# Patient Record
Sex: FEMALE | Race: ASIAN | NOT HISPANIC OR LATINO | Employment: UNEMPLOYED | URBAN - METROPOLITAN AREA
[De-identification: names, ages, dates, MRNs, and addresses within clinical notes are randomized per-mention and may not be internally consistent; named-entity substitution may affect disease eponyms.]

---

## 2017-08-28 ENCOUNTER — GENERIC CONVERSION - ENCOUNTER (OUTPATIENT)
Dept: OTHER | Facility: OTHER | Age: 27
End: 2017-08-28

## 2018-01-15 NOTE — MISCELLANEOUS
Message  patient called c/o spotting  states she had bad abdominal cramping through the night and woke this morning to some heavier bleeding and passing tissue  patient denies any pain or bleeding at this time  given appt to be seen on Monday  counseled to go to ER if she developes and new symptoms, fever, heavy bleeding or pain  Active Problems    1  Chronic fatigue (780 79) (R53 82)   2  First trimester pregnancy (V22 2) (Z33 1)   3  Hyperlipidemia (272 4) (E78 5)   4  Hypocalcemia (275 41) (E83 51)   5  Need for Tdap vaccination (V06 1) (Z23)   6  Vitamin D deficiency (268 9) (E55 9)   7  Weight loss (783 21) (R63 4)    Current Meds   1  Pre- Formula Oral Tablet; Therapy: (Recorded:[id number]) to Recorded    Allergies    1  No Known Drug Allergies    2  No Known Environmental Allergies   3   No Known Food Allergies    Signatures   Electronically signed by : Nevaeh Kaiser, ; Oct  5 2016  3:22PM EST                       (Author)

## 2018-01-18 NOTE — MISCELLANEOUS
Message  CALLED PATIENT TO RESCHEDULE MISSED APPT  LEFT MESSAGE ON VOICEMAIL  [employer] Active Problems    1  Chronic fatigue (780 79) (R53 82)   2  First trimester pregnancy (V22 2) (Z34 90)   3  Hyperlipidemia (272 4) (E78 5)   4  Hypocalcemia (275 41) (E83 51)   5  Need for Tdap vaccination (V06 1) (Z23)   6  Vitamin D deficiency (268 9) (E55 9)   7  Weight loss (783 21) (R63 4)    Current Meds   1  Pre-Felicia Formula Oral Tablet; Therapy: (Recorded:[id number]) to Recorded    Allergies    1  No Known Drug Allergies    2  No Known Environmental Allergies   3   No Known Food Allergies    Signatures   Electronically signed by : Digna Paget, ; Aug 28 2017 11:28AM EST                       (Author)

## 2019-01-14 ENCOUNTER — OFFICE VISIT (OUTPATIENT)
Dept: OBGYN CLINIC | Facility: CLINIC | Age: 29
End: 2019-01-14

## 2019-01-14 VITALS
SYSTOLIC BLOOD PRESSURE: 117 MMHG | HEIGHT: 63 IN | HEART RATE: 101 BPM | DIASTOLIC BLOOD PRESSURE: 86 MMHG | BODY MASS INDEX: 19.49 KG/M2 | WEIGHT: 110 LBS

## 2019-01-14 DIAGNOSIS — E83.51 HYPOCALCEMIA: ICD-10-CM

## 2019-01-14 DIAGNOSIS — O21.9 NAUSEA/VOMITING IN PREGNANCY: Primary | ICD-10-CM

## 2019-01-14 PROCEDURE — 99213 OFFICE O/P EST LOW 20 MIN: CPT | Performed by: OBSTETRICS & GYNECOLOGY

## 2019-01-14 RX ORDER — MELATONIN
1000 DAILY
Qty: 30 TABLET | Refills: 11 | Status: ON HOLD | OUTPATIENT
Start: 2019-01-14 | End: 2019-07-18 | Stop reason: ALTCHOICE

## 2019-01-14 RX ORDER — PYRIDOXINE HCL (VITAMIN B6) 25 MG
25 TABLET ORAL DAILY
Qty: 90 TABLET | Refills: 1 | Status: SHIPPED | OUTPATIENT
Start: 2019-01-14 | End: 2019-09-05 | Stop reason: SDUPTHER

## 2019-01-14 RX ORDER — PRASTERONE (DHEA) 50 MG
1 CAPSULE ORAL 3 TIMES DAILY
Qty: 90 EACH | Refills: 1 | Status: SHIPPED | OUTPATIENT
Start: 2019-01-14 | End: 2019-05-08 | Stop reason: ALTCHOICE

## 2019-01-14 RX ORDER — SWAB
1 SWAB, NON-MEDICATED MISCELLANEOUS DAILY
Qty: 30 EACH | Refills: 11 | Status: SHIPPED | OUTPATIENT
Start: 2019-01-14 | End: 2019-05-08 | Stop reason: ALTCHOICE

## 2019-01-14 NOTE — PROGRESS NOTES
ASSESSMENT  Sagar Trent is a 25 y o  [id number] w/LMP 10/10/18 presenting for viability scan    PLAN  Prenatal vitamins: Rx sent  Existing meds: reviewed  N&V screen: c/o Nausea; tolerates PO; Rx B6, lorri  dating TVUS: completed; Pt informed of due date   ____________________________________________________________    SUBJECTIVE  No complaints ; She denies contractions, loss of fluid, or vaginal bleeding  She denies regular fetal movements  OBJECTIVE        Past Medical History:   Diagnosis Date    Miscarriage     2015     History reviewed  No pertinent surgical history  OB History   No data available      reports that she has never smoked  She has never used smokeless tobacco  She reports that she does not drink alcohol or use drugs  Immunization History   Administered Date(s) Administered    HPV Quadrivalent 09/09/2013    Tdap 08/27/2013     No Known Allergies  Patient's Medications    No medications on file       Vitals:    01/14/19 1347   BP: 117/86   Pulse: 101   Weight: 49 9 kg (110 lb)   Height: 5' 3" (1 6 m)       Most recent 5 weights: Wt Readings from Last 5 Encounters:   01/14/19 49 9 kg (110 lb)   09/29/16 49 kg (108 lb)   09/26/16 49 3 kg (108 lb 9 6 oz)   04/27/16 54 kg (119 lb 0 8 oz)   11/12/13 54 2 kg (119 lb 8 9 oz)         Body mass index is 19 49 kg/m²  TVUS:  Anatomic detail is limited at this gestational age  The gestational sac is normal in contour and shape  The gestational sac is located in the uterus  A single fetus is observed  The fetal cranium appears normal in shape  The nuchal translucency measurement is 17 mm  The nasal bone appears to be present  The intracranial anatomy is unremarkable  Evaluation of the spine reveals no obvious evidence for a neural tube defect  Anatomy of the fetal thorax appears within normal limits  The cardiac rhythm is regular  Cardiac wall motion is observed at 157 bpm via 'M' mode    Within the abdomen, the stomach & bladder is visualized  The abdominal wall appears intact  Active movement of the fetal body & extremities is seen  The placenta is posterior in location  There is no suspicion of a subchorionic hematoma  There is no suspicion of a uterine fibroid  Free fluid is not seen in the posterior cul-de-sac  Patient's last menstrual period was 10/10/2018  which is consistent with 13 weeks 5 days  Measured crown-rump length corresponds to 13 weeks 4 days  Final due date is 7/17/19  The left ovary measures L x W x H: 2 25 x 1 99 x 2 32 cm  It is unremarkable in size and appearance  The right ovary measures L x W x H: 2 92 x 2 38 x 1 50 cm  It is unremarkable in size and appearance  No gross abnormalities were noted on this examination

## 2019-01-14 NOTE — PROGRESS NOTES
Note on use of High Level Disinfection Process (Trophon) for transvaginal probe:     Isaura Carey    REF: [id number]   SN: [id number]     [address] [place] [address] [place] #[id number]

## 2019-01-21 ENCOUNTER — TELEPHONE (OUTPATIENT)
Dept: OBGYN CLINIC | Facility: CLINIC | Age: 29
End: 2019-01-21

## 2019-02-11 ENCOUNTER — INITIAL PRENATAL (OUTPATIENT)
Dept: OBGYN CLINIC | Facility: CLINIC | Age: 29
End: 2019-02-11

## 2019-02-11 DIAGNOSIS — Z3A.17 17 WEEKS GESTATION OF PREGNANCY: Primary | ICD-10-CM

## 2019-02-11 DIAGNOSIS — Z34.92 PREGNANT AND NOT YET DELIVERED IN SECOND TRIMESTER: ICD-10-CM

## 2019-02-11 PROCEDURE — T1001 NURSING ASSESSMENT/EVALUATN: HCPCS

## 2019-02-11 NOTE — PATIENT INSTRUCTIONS
Warning signs during pregnancy      When to Call    1  Vaginal bleeding  2  Sharp abdominal pain that does not go away  3  Fever (more than 100 4 and is not relieved by Tylenol)  4  Persistent vomiting lasting greater than 24 hours  5  Chest pain   6  Pain or burning when you urinate  7  Severe headache that doesn't resolve with Tylenol  8  Blurred vision or seeing spots in your vision  9  Sudden swelling of your face or hands  10  Redness, swelling or pain in a leg  11  A sudden weight gain in just a few days  12  Decrease in your baby's movement (after 28 weeks or the 6th month of pregnancy)  13  A loss of watery fluid from your vagina - can be a gush, a trickle or continuous wetness  14  After 20 weeks of pregnancy, rhythmic cramping (greater than 4 per hour) or menstrual like low/pelvic pain          Medications and Pregnancy: The following list of over-the-counter medications is usually considered safe to take during pregnancy  Take care to not double up on products containing acetaminophen (Tylenol)  Colds/Sore Throat   Robitussin DM - Plain (guaifenesin)   Saline nasal spray   Warm salt water gargle   Cepacol throat lozenges or mouthwash (cetylpyridinium)   Sucrets (hexylresoricinol)    Allergy  AVOID the D - or DECONGESTANT   Claritin (loratadine)   Zrytec (cetirizine)   Allerga (fexofenadine)   Headache/ Aches and Pains    Headaches / Aches and Pains:   Tylenol (acetaminophen)  Do NOT exceed more than 3000 mg of Tylenol in a 24-hour period      Heartburn   Mylanta (aluminum hydroxide/simethicone, magnesium hydroxide)   Maalaox (aluminum magnesium hydroxide, magnesium hydroxide)   Tums (calcium carbonate)   Riopan (magaldrate)    Constipation   Colace (docusate sodium)   Surfak (docusate sodium)   MiraLAX   Glycerin suppositories   Fleets enema (sodium phosphate & sodium biphosphate)    Nausea/Vomiting   Vitamin B6 (pyridoxine) - May take 50 mg at bedtime, 25 mg in the morning, 25 mg in the afternoon   Unisom (doxylamine) - May use for nausea/vomiting - (cut a 25 mg tablet in half)  May cause drowsiness  Sleep   Benadryl (diphenhydramine) - Take 1-2 tablets as needed at bedtime   Unisom (doxylamine) 25 mg tablet - As needed at bedtime   Melatonin 5 mg tablet - As needed at bedtime    Generally the generic form of medicine is usually lower priced than the brand name form of the medicine  Pregnancy   AMBULATORY CARE:   What you need to know about pregnancy:  A normal pregnancy lasts about 40 weeks  The first trimester lasts from your last period through the 12th week of pregnancy  The second trimester lasts from the 13th week of your pregnancy through the 23rd week  The third trimester lasts from your 24th week of pregnancy until your baby is born  If you know the date of your last period, your healthcare provider can estimate your due date  You may give birth to your baby any time from 37 weeks to 2 weeks after your due date  Seek care immediately if:   · You develop a severe headache that does not go away  · You have new or increased vision changes, such as blurred or spotted vision  · You have new or increased swelling in your face or hands  · You have pain or cramping in your abdomen or low back  · You have vaginal bleeding  Contact your healthcare provider or obstetrician if:   · You have abdominal cramps, pressure, or tightening  · You have a change in vaginal discharge  · You cannot keep food or drinks down, and you are losing weight  · You have chills or a fever  · You have vaginal itching, burning, or pain  · You have yellow, green, white, or foul-smelling vaginal discharge  · You have pain or burning when you urinate, less urine than usual, or pink or bloody urine  · You have questions or concerns about your condition or care    Body changes that may occur during your pregnancy:   · Breast changes  you will experience include tenderness and tingling during the early part of your pregnancy  Your breasts will become larger  You may need to use a support bra  You may see a thin, yellow fluid, called colostrum, leak from your nipples during the second trimester  Colostrum is a liquid that changes to milk about 3 days after you give birth  · Skin changes and stretch marks  may occur during your pregnancy  You may have red marks, called stretch marks, on your skin  Stretch marks will usually fade after pregnancy  Use lotion if your skin is dry and itchy  The skin on your face, around your nipples, and below your belly button may darken  Most of the time, your skin will return to its normal color after your baby is born  · Morning sickness  is nausea and vomiting that can happen at any time of day  Avoid fatty and spicy foods  Eat small meals throughout the day instead of large meals  Lexi may help to decrease nausea  Ask your healthcare provider about other ways of decreasing nausea and vomiting  · Heartburn  may be caused by changes in your hormones during pregnancy  Your growing uterus may also push your stomach upward and force stomach acid to back up into your esophagus  Eat 4 or 5 small meals each day instead of large meals  Avoid spicy foods  Avoid eating right before bedtime  · Constipation  may develop during your pregnancy  To treat constipation, eat foods high in fiber such as fiber cereals, beans, fruits, vegetables, whole-grain breads, and prune juice  Get regular exercise and drink plenty of water  Your healthcare provider may also suggest a fiber supplement to soften your bowel movements  Talk to your healthcare provider before you use any medicines to decrease constipation  · Hemorrhoids  are enlarged veins in the rectal area  They may cause pain, itching, and bright red bleeding from your rectum  To decrease your risk of hemorrhoids, prevent constipation and do not strain to have a bowel movement   If you have hemorrhoids, soak in a tub of warm water to ease discomfort  Ask your healthcare provider how you can treat hemorrhoids  · Leg cramps and swelling  may be caused by low calcium levels or the added weight of pregnancy  Raise your legs above the level of your heart to decrease swelling  During a leg cramp, stretch or massage the muscle that has the cramp  Heat may help decrease pain and muscle spasms  Apply heat on your muscle for 20 to 30 minutes every 2 hours for as many days as directed  · Back pain  may occur as your baby grows  Do not stand for long periods of time or lift heavy items  Use good posture while you stand, squat, or bend  Wear low-heeled shoes with good support  Rest may also help to relieve back pain  Ask your healthcare provider about exercises you can do to strengthen your back muscles  Stay healthy during your pregnancy:   · Eat a variety of healthy foods  Healthy foods include fruits, vegetables, whole-grain breads, low-fat dairy foods, beans, lean meats, and fish  Drink liquids as directed  Ask how much liquid to drink each day and which liquids are best for you  Limit caffeine to less than 200 milligrams each day  Limit your intake of fish to 2 servings each week  Choose fish low in mercury such as canned light tuna, shrimp, crab, salmon, cod, or tilapia  Do not  eat fish high in mercury such as swordfish, tilefish, randy mackerel, and shark  · Take prenatal vitamins as directed  Your need for certain vitamins and minerals, such as folic acid, increases during pregnancy  Prenatal vitamins provide some of the extra vitamins and minerals you need  Prenatal vitamins may also help to decrease the risk of certain birth defects  · Ask how much weight you should gain during your pregnancy  Too much or too little weight gain can be unhealthy for you and your baby  · Talk to your healthcare provider about exercise  Moderate exercise can help you stay fit   Your healthcare provider will help you plan an exercise program that is safe for you during pregnancy  · Do not smoke  If you smoke, it is never too late to quit  Smoking increases your risk of a miscarriage and other health problems during your pregnancy  Smoking can cause your baby to be born too early or weigh less at birth  Ask your healthcare provider for information if you need help quitting  · Do not drink alcohol  Alcohol passes from your body to your baby through the placenta  It can affect your baby's brain development and cause fetal alcohol syndrome (FAS)  FAS is a group of conditions that causes mental, behavior, and growth problems  · Talk to your healthcare provider before you take any medicines  Many medicines may harm your baby if you take them when you are pregnant  Do not take any medicines, vitamins, herbs, or supplements without first talking to your healthcare provider  Never use illegal or street drugs (such as marijuana or cocaine) while you are pregnant  Safety tips:   · Avoid hot tubs and saunas  Do not use a hot tub or sauna while you are pregnant, especially during your first trimester  Hot tubs and saunas may raise your baby's temperature and increase the risk of birth defects  · Avoid toxoplasmosis  This is an infection caused by eating raw meat or being around infected cat feces  It can cause birth defects, miscarriages, and other problems  Wash your hands after you touch raw meat  Make sure any meat is well-cooked before you eat it  Avoid raw eggs and unpasteurized milk  Use gloves or ask someone else to clean your cat's litter box while you are pregnant  · Ask your healthcare provider about travel  The most comfortable time to travel is during the second trimester  Ask your healthcare provider if you can travel after 36 weeks  You may not be able to travel in an airplane after 36 weeks  He may also recommend that you avoid long road trips    Follow up with your healthcare provider or obstetrician as directed:  Go to all of your prenatal visits during your pregnancy  Write down your questions so you remember to ask them during your visits  © 2017 [employer]  Information is for End User's use only and may not be sold, redistributed or otherwise used for commercial purposes  All illustrations and images included in CareNotes® are the copyrighted property of [employer]  or Devendra Thomas  The above information is an  only  It is not intended as medical advice for individual conditions or treatments  Talk to your doctor, nurse or pharmacist before following any medical regimen to see if it is safe and effective for you  Pregnancy Diet   WHAT YOU NEED TO KNOW:   What is a healthy diet during pregnancy? A healthy diet during pregnancy is a meal plan that provides the amount of calories and nutrients you need during pregnancy  Your body needs extra calories and nutrients to support your growing baby  You need to gain the right amount of weight for a healthy baby and pregnancy  Babies born at a healthy weight have a lower risk of certain health problems at birth and later in life  A healthy diet may help you avoid gaining too much weight  Too much weight gain may cause problems for you during your pregnancy and delivery  What should I avoid while I am pregnant? · Alcohol:  Do not drink alcohol during pregnancy  Alcohol can increase your risk of a miscarriage (losing your baby)  Your baby may also be born too small and have other health problems, such as learning problems later in life  · Caffeine: It is not clear how caffeine affects pregnancy  Limit your intake of caffeine to avoid possible health problems  Caffeine may be found in coffee, tea, cola, sports drinks, and chocolate  · Foods that contain mercury:  Mercury is naturally found in almost all types of fish and shellfish   Some types of fish absorb higher levels of mercury that can be harmful to an unborn baby  Eat only fish and shellfish that are low in mercury  Each week, you may eat up to 12 ounces of fish or shellfish that have low levels of mercury  These include shrimp, canned light tuna, salmon, pollock, and catfish  Eat only 6 ounces of albacore (white) tuna per week  Albacore tuna has more mercury than canned tuna  Do not eat shark, swordfish, randy mackerel, or tilefish  · Raw and undercooked foods: You should not eat undercooked or raw meat, poultry, eggs, fish, or shellfish (shrimp, crab, lobster)  Cook leftover foods and ready-to-eat foods such as hot dogs until they are steaming hot  · Unpasteurized food:  Unpasteurized foods are foods that have not gone through the heating process (pasteurization) that destroys bacteria  You should not drink milk, juice, or cheese that has not been pasteurized  This includes Brie, feta, Camembert, blue, and [place] cheeses  Which foods can I eat while I am pregnant? Eat a variety of foods from each of the food groups listed below  Your dietitian will tell you how many servings you should have from each food group each day to get enough calories  The amount of calories you need depends on your daily activity, your weight before pregnancy, and current weight gain  Healthcare providers divide pregnancy into 3 periods of time called trimesters  In the first trimester, you usually do not need extra calories  In the second and third trimesters, most women should eat about 300 extra calories each day  · Fruits and vegetables:  Half of your plate should contain fruits and vegetables  ¨ Fruits:  Choose fresh, canned, or dried fruit as often as possible  ¨ 1 cup of sliced, diced, cooked, or canned fruit (canned in light syrup or 100% juice)    ¨ 1 large peach, orange, or banana    ¨ ½ cup of dried fruit    ¨ 1 cup of fruit juice    ¨ Vegetables:  Eat more dark green, red, and orange vegetables   Dark green vegetables include broccoli, spinach, jerry lettuce, and cher greens  Examples of orange and red vegetables are carrots, sweet potatoes, winter squash, oranges, and red peppers  ¨ 1 cup of cooked or raw vegetables    ¨ 1 cup of vegetable juice    ¨ 2 cups of raw leafy greens    · Grains:  Half of the grains you eat each day should be whole grains  ¨ Whole grains:      ¨ ½ cup of cooked brown rice or cooked oatmeal    ¨ 1 cup (1 ounce) of whole-grain dry cereal    ¨ 1 slice of 293% whole-wheat or rye bread    ¨ 3 cups of popped popcorn    ¨ Other grains:      ¨ ½ cup of cooked white rice or pasta    ¨ ½ of an English muffin    ¨ 1 small flour or corn tortilla    ¨ 1 mini-bagel    · Dairy foods:  Choose fat-free or low-fat dairy foods:    ¨ 1½ ounces of hard cheese (mozzarella, Swiss, cheddar)     ¨ 1 cup (8 ounces) of low-fat or fat-free milk or yogurt    ¨ 1 cup of low-fat frozen yogurt or pudding    · Meat and other protein sources:  Choose lean meats and poultry  Bake, broil, and grill meat instead of frying it  Include a variety of seafood in place of some meat and poultry each week  Eat a variety of protein foods:    ¨ ½ ounce of nuts (12 almonds, 24 pistachios, 7 walnut halves) or 1 tablespoon of peanut butter (1 ounce)    ¨ ¼ cup of soy tofu or tempeh (1 ounce)    ¨ 1 egg    ¨ ¼ cup of cooked dried beans, peas, or lentils (1 ounce)    ¨ 1 small chicken breast or 1 small trout (about 3 ounces)    ¨ 1 salmon steak (4 to 6 ounces)    ¨ 1 small lean hamburger (2 to 3 ounces)    · Fats:  Limit saturated fats, trans fats, and cholesterol  These unhealthy fats are found in shortening, butter, stick margarine, and animal fat  Choose healthy fats such as polyunsaturated and monounsaturated fats:     ¨ 1 tablespoon of canola, olive, corn, sunflower, or soybean oil    ¨ 1 tablespoon of soft margarine    ¨ 1 teaspoon of mayonnaise    ¨ 2 tablespoons of salad dressing    ¨ ½ of an avocado  What vitamin and mineral supplements may I need? Your healthcare provider will tell you if you need a supplement and the type you should take  Talk to your healthcare provider before you take any other kind of supplement, including herbal (natural) supplements  · Prenatal vitamins:  Eat a variety of healthy foods, even if you take a prenatal vitamin  If you forget to take your vitamin, do not take double the amount the next day  · Folic acid:  You need at least 465 mcg of folic acid each day before you get pregnant  Folic acid helps to form your baby's brain and spinal cord in early pregnancy  During pregnancy, your daily need for folic acid increases to about 600 mcg  Get folic acid each day by eating citrus fruits and juices, green leafy vegetables, liver, or dried beans  Folic acid is also added to foods such as breakfast cereals, bread products, flour, and pasta  · Iron:  Iron is a mineral the body needs to make hemoglobin, which is a part of red blood cells  Hemoglobin helps your blood carry oxygen from the lungs to the rest of your body  Foods that are good sources of iron are meat, poultry, fish, beans, spinach, and fortified cereals and breads  Your body will absorb iron better from non-meat sources if you have a source of vitamin C at the same time  Drink tea and coffee separately from iron-fortified foods and iron supplements  You need about 30 mg of iron each day during pregnancy  · Calcium and vitamin D:  Women who do not eat dairy products may need a calcium and vitamin D supplement  Talk to your healthcare provider about calcium supplements if you do not regularly eat good sources of calcium  The amount of calcium you need is about 1,300 mg if you are between 15and 25years old and 1,000 mg if you are 23to 48years old  What diet changes may help if I have morning sickness? Morning sickness is common during the first few months of pregnancy  You may feel nauseated, and you may vomit several times each day   To improve symptoms of morning sickness, eat small, frequent meals instead of 3 large meals  Foods high in carbohydrate, such as crackers, dry toast, and pasta, may be easier for you to eat  Drink liquids between meals rather than with meals  What diet changes may decrease constipation? A high-fiber diet can improve the symptoms of constipation  Whole-grain breakfast cereals, whole-grain breads, and prune juice are high in fiber  Raw fruits and vegetables, and cooked beans are also good sources of fiber  It may also be helpful to increase your intake of fluids and get regular physical activity  Talk with your healthcare provider before you begin any exercise program    What diet changes may decrease heartburn? To improve the symptoms of heartburn, do not lie down right after you eat  When you do lie down, sleep with your head slightly elevated  Eat small, frequent meals instead of 3 large meals  It may also be helpful to avoid caffeine, chocolate, and spicy foods  How can I get enough calcium if I cannot tolerate dairy foods? If you cannot drink milk or eat dairy foods, try lactose-free or lactose-reduced milk or calcium-fortified soy milk  Ask your healthcare provider about pills you can take to help you digest milk products  Eat other drinks and foods that are fortified with calcium, such as orange juice  What other healthy guidelines should I follow? · Vegetarians and vegans: If you are a vegetarian or vegan, get enough protein, vitamin B12, and iron during your pregnancy  Some non-meat sources of these nutrients are fortified cereals, nut butters, soy products (tofu and soymilk), nuts, grains, and legumes  These nutrients are also found in eggs and milk products  · Cravings: You may have cravings for certain foods during your pregnancy  Foods that are high in calories, fat, and sugar should not replace healthy food choices   Some women have cravings for unusual substances such as andrei, dirt, laundry starch, ice, and mikie  This condition is called pica  These may lead to health problems such as anemia and cause other health problems  When should I contact my healthcare provider? · You are losing weight without trying  · You have cravings for substances such as andrei, dirt, laundry starch, or ice  · You have questions or concerns about your condition or care  CARE AGREEMENT:   You have the right to help plan your care  Discuss treatment options with your caregivers to decide what care you want to receive  You always have the right to refuse treatment  The above information is an  only  It is not intended as medical advice for individual conditions or treatments  Talk to your doctor, nurse or pharmacist before following any medical regimen to see if it is safe and effective for you  © 2017 [employer] Information is for End User's use only and may not be sold, redistributed or otherwise used for commercial purposes  All illustrations and images included in CareNotes® are the copyrighted property of [employer]  or Devendra Thomas  Nausea and Vomiting in Pregnancy   AMBULATORY CARE:   Nausea and vomiting in pregnancy  can happen any time of day  These symptoms usually start before the 9th week of pregnancy, and end by the 14th week (second trimester)  Some women can have nausea and vomiting for a longer time  These symptoms can affect some women throughout the entire pregnancy  Nausea and vomiting do not harm your baby  These symptoms can make it hard for you to do your daily activities  Seek care immediately if:   · You have signs of dehydration  Examples are dark yellow urine, dry mouth and lips, dry skin, fast heartbeat, and urinating less than usual     · You have severe abdominal pain  · You feel too weak or dizzy to stand up  · You see blood in your vomit or bowel movements    Contact your healthcare provider if:   · You vomit more than 4 times in 1 day     · You have not been able to keep liquids down for more than 1 day  · You lose more than 2 pounds  · You have a fever  · Your nausea and vomiting continue longer than 14 weeks  · You have questions or concerns about your condition or care  Treatment  for nausea and vomiting in pregnancy is usually not needed  You can make changes in the foods you eat and in your activities to help manage your symptoms  You may need to try several things to learn what works for you  Talk to your healthcare provider if your symptoms do not decrease with the changes suggested below  You may need vitamin B6 and medicine if these changes do not help, or your symptoms become severe  Nutrition changes you can make to manage nausea and vomiting:   · Eat small meals throughout the day instead of 3 large meals  You may be more likely to have nausea and vomiting when your stomach is empty  Eat foods that are low in fat and high in protein  Examples are lean meat, beans, turkey, and chicken without the skin  Eat a small snack, such as crackers, dry cereal, or a small sandwich before you go to bed  · Eat some crackers or dry toast before you get out of bed in the morning  Get out of bed slowly  Sudden movements could cause you to get dizzy and nauseated  · Eat bland foods when you feel nauseated  Examples of bland foods include dry toast, dry cereal, plain pasta, white rice, and bread  Other bland foods include saltine crackers, bananas, gelatin, and pretzels  Avoid spicy, greasy, and fried foods  Avoid any other foods that make you feel nauseated  · Drink liquids that contain ginger  Drink ginger ale made with real ginger or ginger tea made with fresh grated ginger  Ginger capsules or ginger candies may also help to decrease nausea and vomiting  · Drink liquids between meals instead of with meals  Wait at least 30 minutes after you eat to drink liquids   Drink small amounts of liquids often throughout the day to prevent dehydration  Ask how much liquid you should drink each day  Other changes you can make to manage nausea and vomiting:   · Avoid smells that bother you  Strong odors may cause nausea and vomiting to start, or make it worse  Take a short walk, turn on a fan, or try to sleep with the window open to get fresh air  When you are cooking, open windows to get rid of smells that may cause nausea  · Do not brush your teeth right after you eat  if it makes you nauseated  · Rest when you need to  Start activity slowly and work up to your usual routine as you start to feel better  · Talk to your healthcare provider about your prenatal vitamins  Prenatal vitamins can cause nausea for some women  Try taking your prenatal vitamin at night or with a snack  If this change does not help, your healthcare provider may recommend a different type of vitamin  · Do not use any medicines, vitamins, or supplements to manage your symptoms without asking your healthcare provider  Many medicines can harm an unborn baby  · Light to moderate exercise  may help to decrease your symptoms  It may also help you to sleep better at night  Ask your healthcare provider about the best exercise plan for you  Follow up with your healthcare provider as directed:  Write down your questions so you remember to ask them during your visits  © 2017 [address] [facility] Information is for End User's use only and may not be sold, redistributed or otherwise used for commercial purposes  All illustrations and images included in CareNotes® are the copyrighted property of [employer]  or Devendra Thomas  The above information is an  only  It is not intended as medical advice for individual conditions or treatments  Talk to your doctor, nurse or pharmacist before following any medical regimen to see if it is safe and effective for you        Zika Virus: Information for Pregnant Women   AMBULATORY CARE:   Zika virus  Zika virus is carried by mosquitos  The virus is spread to a human through the bite of an infected mosquito  The virus may also be passed from one person to another through sex  [place] virus may be passed from a mother to her unborn baby  This may cause birth defects such as poor brain development  It may also cause pregnancy loss  There is currently no vaccine to prevent Zika virus infection  Common signs and symptoms include the following: You may not have signs or symptoms of Zika virus  If you develop symptoms, they may happen suddenly and last for 2 to 7 days  You may have any of the following:  · Fever    · Rash    · Headache    · Muscle or joint pain    · Red or itchy eyes  Contact your healthcare provider if:   · You think you have been exposed to [place] virus  · You have symptoms of Zika virus  · You have questions or concerns about your condition or care  Prevent mosquito bites:  Do not travel to areas where [place] virus is common  Ask your healthcare provider where it is safe to travel  Prevent mosquito bites to help decrease your risk for Zika virus infection:  · Apply insect repellent  Ask your healthcare provider which insect repellent is right for you  Most insect repellents are safe to use during pregnancy  Follow directions on the insect repellent container  The following is a list of tips for insect repellent use:     ¨ Do not  apply insect repellent to skin under clothing  ¨ Apply sunscreen before you apply insect repellent  ¨ Wear insect repellent any time you plan to be outside  Wear insect repellent at all times if you travel or live in a high-risk area  Reapply insect repellent as directed  ¨ Apply insect repellent every day for 3 weeks after you travel to high-risk areas  · Wear a long-sleeved shirt and pants  This will protect your skin from mosquito bites  · Use screens and nets  Use a mosquito net around your bed   When you travel, choose a place to stay with screens on all windows and doors  Place screens over windows and doors in your home  Fix holes or tears in screens and nets, or buy new screens and nets  · Keep doors and windows closed  If possible, use air conditioning to cool your home  · Apply insect repellent to clothing and gear  This includes boots, pants, socks, and tents  Do this when you camp, hike, or work outside  You can also buy clothing and gear that comes with insect repellent already on it  · Clean and empty containers of water once a week  Examples are animal bowls, buckets of water, gutters, flower vases, and bird baths  Mosquitoes lay eggs near water  Empty the water and scrub these containers with soap and water  Keep water containers covered with a tight-fitting lid, when possible  · Use insect sprays inside and outside of your home  Use an insect spray that is safe to use inside of your home  Place a device that sprays mosquitoes outside of your home  Place it in a dark, cool, area  Ask your healthcare provider where to buy these items  Follow directions that come with these products  Practice safe sex during pregnancy: The following will decrease your risk for Zika virus  It will also decrease the risk that you will pass Zika virus to your baby  · Do not have sex with a man or a woman who is infected with Zika virus while you are pregnant  Do not have sex with a man or a woman who has been exposed to [place] virus while you are pregnant  Your partner may be at risk for exposure if he or she has traveled to an area with Wil[place]ta Kim infection  Sex includes oral, vaginal, and anal sex  · If you choose to have sex during pregnancy, use a condom or latex barrier every time you have sex  Use protection for all types of sexual contact with a man or woman  This includes oral, vaginal, and anal sex  Use a new condom or latex barrier each time you have sex  Make sure that the condom fits and is put on correctly   If you are allergic to latex, use a nonlatex product such as polyurethane  For the most up-to-date information on Zika virus:  Knowledge about the [place] virus is changing quickly  Get the most up-to-date information at:  · Centers for Disease Control and Prevention Information on [address]  [address] [facility]  Phone: 1- 132 - 151-4807  Web Address: Radha tn  Follow up with your healthcare provider as directed:  Write down your questions so you remember to ask them during your visits  © 2017 [address] [employer]  Information is for End User's use only and may not be sold, redistributed or otherwise used for commercial purposes  All illustrations and images included in CareNotes® are the copyrighted property of A [employer] M [employer] Inc  or Devendra Thomas  The above information is an  only  It is not intended as medical advice for individual conditions or treatments  Talk to your doctor, nurse or pharmacist before following any medical regimen to see if it is safe and effective for you  Influenza Vaccine   AMBULATORY CARE:   The influenza vaccine  is an injection given to help prevent influenza (flu)  The flu is caused by a virus  The virus spreads from person to person through coughing and sneezing  Several types of viruses cause the flu  The viruses change over time, so new vaccines are made each year  The vaccine begins to protect you about 2 weeks after you get it  The flu shot usually injected into your upper arm  It may be given in your thigh  You may get a vaccine with a weak or dead virus  Call 911 for any of the following:   · Your mouth and throat are swollen  · You are wheezing or have trouble breathing  · You have chest pain or your heart is beating faster than normal for you  · You feel like you are going to faint  Seek care immediately if:   · Your face is red or swollen      · You have hives that spread over your body     · You feel weak or dizzy  Contact your healthcare provider if:   · You have increased pain, redness, or swelling around the area where the shot was given  · You have questions or concerns about the influenza vaccine  When to get the influenza vaccine: The influenza vaccine is offered every year starting in September or October  Get the influenza vaccine as soon as it is available  Children 6 months to 6years old need 2 doses during the first year they get the vaccine  The 2 doses should be given at least 4 weeks apart  It is best if the same type of vaccine is given both times  The child can then receive 1 dose each year  Children 9 years or older should get 1 dose each year  Who should get the flu shot:   · Infants 6 months or older    · Any healthy adult who would like to decrease the risk for the flu    · Anyone living with or caring for children younger than 5 years     · Healthcare workers    · Anyone who lives in a long-term care facility    · Anyone who has chronic health problems, such as asthma, diabetes, or blood disorders    · Anyone who has a weak immune system    · Women who are or will be pregnant during the flu season  Who should not get the flu shot:  If you have an egg allergy, ask your healthcare provider if it is safe to get the flu shot  You will need to be closely monitored by a healthcare provider while you receive the vaccine, and for an hour or more after  The following should not get the flu shot:  · Infants younger than 6 months     · Anyone who has had an allergic reaction to the flu shot    · Anyone who is sick or has a fever    · Anyone who received a diagnosis of Guillain-Barré syndrome within 6 weeks of getting a flu vaccine    · Anyone who is allergic to thimerosal (mercury)  Risks of the influenza vaccine: The flu shot may cause mild symptoms, such as a fever, headache, and muscle aches   It may also cause mild to moderate soreness or redness at the area where you were given the shot  The nasal spray may cause a fever, runny or stuffy nose, headache, muscle aches, or vomiting  You may still get the flu after you receive the influenza vaccine  If you are allergic to eggs, ask about an egg-free vaccine  You may have an allergic reaction to the vaccine  This can be life-threatening  Follow up with your healthcare provider as directed:  Write down your questions so you remember to ask them during your visits  © 2017 [facility] Information is for End User's use only and may not be sold, redistributed or otherwise used for commercial purposes  All illustrations and images included in CareNotes® are the copyrighted property of [employer]  or Devendra Thomas  The above information is an  only  It is not intended as medical advice for individual conditions or treatments  Talk to your doctor, nurse or pharmacist before following any medical regimen to see if it is safe and effective for you  Tdap:     Diphtheria/Acellular Pertussis/Tetanus Booster Vaccine (Tdap) (By injection)   Pertussis Vaccine, Acellular (per-TUS-iss VAX-een, m-ZJPE-qgh-lar), Reduced Diphtheria Toxoid (ree-DOOST dif-THEER-ee-a TOX-oyd), Tetanus Toxoid (TET-a-nus TOX-oyd)  Protects against infections caused by tetanus (lockjaw), diphtheria, or pertussis (whooping cough)  This is a booster vaccine  Brand Name(s): Adacel, Boostrix   There may be other brand names for this medicine  When This Medicine Should Not Be Used: You should not receive this vaccine if you have had an allergic reaction to the separate or combined tetanus, diphtheria, or pertussis vaccine  You should not receive this vaccine if you have had seizures, mental changes, or any other serious reaction within 7 days after you received a pertussis vaccine  How to Use This Medicine:   Injectable  · A nurse or other health provider will give you this medicine    · Your doctor will prescribe your exact dose and tell you how often it should be given  This medicine is given as a shot into one of your muscles  · You may receive other vaccines at the same time as this one, but in a different body area  You should receive patient instructions for all of the vaccines  Talk to your doctor or nurse if you have questions  · Read and follow the patient instructions that come with this medicine  Talk to your doctor or pharmacist if you have any questions  Drugs and Foods to Avoid:   Ask your doctor or pharmacist before using any other medicine, including over-the-counter medicines, vitamins, and herbal products  · Make sure the doctor knows if you are receiving a treatment or medicine that weakens your immune system  This includes radiation treatment, steroid medicines (such as dexamethasone, hydrocortisone, methylprednisolone, prednisolone, prednisone, Medrol®), or cancer medicines  Warnings While Using This Medicine:   · Make sure your doctor knows if you are pregnant or breastfeeding or have epilepsy, a weak immune system, or a history of a stroke  Tell your doctor if you are sick or have a fever  · Tell your doctor about any reaction you had after you received a vaccine  This includes fainting, seizures, a fever over 105 degrees F, or severe redness or swelling where the shot was given  Tell your doctor if you have a history of Guillain-Barré syndrome after you received a vaccine with tetanus  · Call your doctor right away if you faint or have vision changes, numbness or tingling in your arms, hands, or feet, or a seizure after you receive this vaccine  · Tell your doctor if you have an allergy to latex  The syringes may contain dry natural latex rubber  · This vaccine will not treat an active infection  If you have a diphtheria, tetanus, or pertussis infection, you will need medicine to treat the infection    Possible Side Effects While Using This Medicine:   Call your doctor right away if you notice any of these side effects:  · Allergic reaction: Itching or hives, swelling in your face or hands, swelling or tingling in your mouth or throat, chest tightness, trouble breathing  · Changes in vision  · Fever over 105 degrees F  · Lightheadedness or fainting  · Numbness, tingling, or burning pain in your hands, arms, legs, or feet  · Seizures  · Sudden numbness or weakness in your arms or legs  · Severe pain, redness, or swelling where the shot was given  If you notice these less serious side effects, talk with your doctor:   · Headache  · Mild pain, redness, or swelling where the shot was given  · Nausea, vomiting, diarrhea, or stomach pain  · Tiredness  If you notice other side effects that you think are caused by this medicine, tell your doctor  Call your doctor for medical advice about side effects  You may report side effects to FDA at 3-534-FDA-8441  © 2017 [address] Information is for End User's use only and may not be sold, redistributed or otherwise used for commercial purposes  The above information is an  only  It is not intended as medical advice for individual conditions or treatments  Talk to your doctor, nurse or pharmacist before following any medical regimen to see if it is safe and effective for you

## 2019-02-11 NOTE — PROGRESS NOTES
OB Intake  o Patient presents for OB intake interview  Pt declined to have interview  "If it takes 10-15 minutes then I will do, if an hour or more I will not do my ob intake anymore  I want regular check-up "   o H&P scheduled for pt  o     o Hx of  delivery prior to 36 weeks 6 days:                             no  o LMP:   Patient's last menstrual period was 10/10/2018 (exact date)  o U/S date: 2019   - 13 weeks  4 days  o Estimated Date of Delivery: None noted  - confirmed by LMP     o Signs and Symptoms of pregnancy:               did not comment     o Immunization Record  Immunization History   Administered Date(s) Administered    HPV Quadrivalent 2013    Tdap 2013   -   o Tdap:  - Counseled to be given after 28 weeks  o Influenza vaccine discussed  o MRSA questionnaire:     negative  o Dental visit within last 6 months  - yes   - If no, recommendations discussed  Interview education:  Educational material provided on After Visit Summary (AVS)   Handouts given at todays visit  o [employer] me phone application guide  o [address] [place] [address] support center  o CDCs Response to [address] [place] [address] Maternal Fetal Medicine  - Sequential screening pamphlet  - Cystic fibrosis pamphlet  o GLENNY letter given    - [address]  - Work   - Dentist    Nurse Family [facility]:    No  ONAF form submitted:    Yes    Betot activated (not 1518 years of age)  No  - Code provided:   Yes    Interview done by: Tari Talamantes RN 19

## 2019-02-19 ENCOUNTER — ROUTINE PRENATAL (OUTPATIENT)
Dept: OBGYN CLINIC | Facility: CLINIC | Age: 29
End: 2019-02-19

## 2019-02-19 VITALS
HEART RATE: 95 BPM | DIASTOLIC BLOOD PRESSURE: 66 MMHG | HEIGHT: 63 IN | WEIGHT: 117 LBS | SYSTOLIC BLOOD PRESSURE: 101 MMHG | BODY MASS INDEX: 20.73 KG/M2

## 2019-02-19 DIAGNOSIS — Z72.51 HIGH RISK HETEROSEXUAL BEHAVIOR: Primary | ICD-10-CM

## 2019-02-19 DIAGNOSIS — Z01.419 ENCOUNTER FOR GYNECOLOGICAL EXAMINATION WITH PAPANICOLAOU SMEAR OF CERVIX: ICD-10-CM

## 2019-02-19 PROCEDURE — 99213 OFFICE O/P EST LOW 20 MIN: CPT | Performed by: OBSTETRICS & GYNECOLOGY

## 2019-02-19 PROCEDURE — 87591 N.GONORRHOEAE DNA AMP PROB: CPT | Performed by: OBSTETRICS & GYNECOLOGY

## 2019-02-19 PROCEDURE — 87624 HPV HI-RISK TYP POOLED RSLT: CPT | Performed by: OBSTETRICS & GYNECOLOGY

## 2019-02-19 PROCEDURE — G0145 SCR C/V CYTO,THINLAYER,RESCR: HCPCS | Performed by: OBSTETRICS & GYNECOLOGY

## 2019-02-19 PROCEDURE — 87491 CHLMYD TRACH DNA AMP PROBE: CPT | Performed by: OBSTETRICS & GYNECOLOGY

## 2019-02-19 RX ORDER — PNV,CALCIUM 72/IRON/FOLIC ACID 27 MG-1 MG
1 TABLET ORAL DAILY
Refills: 11 | COMMUNITY
Start: 2019-01-14 | End: 2020-06-10

## 2019-02-19 NOTE — PROGRESS NOTES
Assessment     25year old  at 18 weeks and 6 days      Plan     Patient has not yet gone for prenatal blood work, she was reminded and encouraged to go for this ASAP  Patient was counseled on diet, exercise, weight gain and sex during pregnancy  Patient declines genetic screening  Pap collected today  GC/chlamyia collected today  Prenatal vitamins  Problem list reviewed and updated  Patient is scheduled for US with [facility] in March  Follow up in 4 weeks  Subjective     Cristine Marie is being seen today for her first obstetrical visit  This is a planned pregnancy  She is at 18w6d gestation  Her obstetrical history is uncomplicated  She denies any HTN, GDM, or any other pregnancy related problems  Relationship with FOB: spouse, living together  Patient does intend to breast feed  Pregnancy history fully reviewed  She denies any vaginal bleeding, LOF or cramping  She has felt fetal movement over the past week  Her nausea and vomiting has subsided  Patient has a history of 2 full term  as well as one first trimester SAB  Menstrual History:  OB History        4    Para   2    Term   2            AB   1    Living   2       SAB   1    TAB        Ectopic        Multiple        Live Births   2                  Patient's last menstrual period was 10/10/2018 (exact date)  The following portions of the patient's history were reviewed and updated as appropriate: allergies, current medications, past family history, past medical history, past social history, past surgical history and problem list     Review of Systems  Pertinent items are noted in HPI        Objective     /66 (BP Location: Left arm, Patient Position: Sitting, Cuff Size: Standard)   Pulse 95   Ht 5' 3" (1 6 m)   Wt 53 1 kg (117 lb)   LMP 10/10/2018 (Exact Date)   BMI 20 73 kg/m²   General appearance: alert and oriented, in no acute distress  Lungs: clear to auscultation bilaterally  Breasts: normal appearance, no masses or tenderness  Heart: regular rate and rhythm, S1, S2 normal, no murmur, click, rub or gallop  Abdomen: soft, non-tender; bowel sounds normal; no masses,  no organomegaly  Pelvic: external genitalia normal, vagina normal without discharge, no cervical motion tenderness, cervix normal in appearance and no adnexal masses or tenderness  Skin: Skin color, texture, turgor normal  No rashes or lesions

## 2019-02-20 LAB
C TRACH DNA SPEC QL NAA+PROBE: NEGATIVE
HPV HR 12 DNA CVX QL NAA+PROBE: NEGATIVE
HPV16 DNA CVX QL NAA+PROBE: NEGATIVE
HPV18 DNA CVX QL NAA+PROBE: NEGATIVE
N GONORRHOEA DNA SPEC QL NAA+PROBE: NEGATIVE

## 2019-02-21 LAB
LAB AP GYN PRIMARY INTERPRETATION: NORMAL
Lab: NORMAL

## 2019-02-28 ENCOUNTER — APPOINTMENT (OUTPATIENT)
Dept: LAB | Facility: HOSPITAL | Age: 29
End: 2019-02-28
Payer: COMMERCIAL

## 2019-02-28 DIAGNOSIS — Z3A.14 14 WEEKS GESTATION OF PREGNANCY: ICD-10-CM

## 2019-02-28 DIAGNOSIS — Z34.92 PREGNANT AND NOT YET DELIVERED IN SECOND TRIMESTER: ICD-10-CM

## 2019-02-28 LAB
ABO GROUP BLD: NORMAL
BACTERIA UR QL AUTO: ABNORMAL /HPF
BASOPHILS # BLD AUTO: 0.01 THOUSANDS/ΜL (ref 0–0.1)
BASOPHILS NFR BLD AUTO: 0 % (ref 0–1)
BILIRUB UR QL STRIP: NEGATIVE
BLD GP AB SCN SERPL QL: NEGATIVE
CLARITY UR: ABNORMAL
COLOR UR: YELLOW
EOSINOPHIL # BLD AUTO: 0.14 THOUSAND/ΜL (ref 0–0.61)
EOSINOPHIL NFR BLD AUTO: 2 % (ref 0–6)
ERYTHROCYTE [DISTWIDTH] IN BLOOD BY AUTOMATED COUNT: 14.3 % (ref 11.6–15.1)
EXTERNAL HIV-1 ANTIBODY: NON REACTIVE
EXTERNAL SYPHILIS RPR SCREEN: NORMAL
GLUCOSE UR STRIP-MCNC: NEGATIVE MG/DL
HBV SURFACE AG SER QL: NORMAL
HCT VFR BLD AUTO: 35.4 % (ref 34.8–46.1)
HGB BLD-MCNC: 12 G/DL (ref 11.5–15.4)
HGB UR QL STRIP.AUTO: NEGATIVE
HYALINE CASTS #/AREA URNS LPF: ABNORMAL /LPF
IMM GRANULOCYTES # BLD AUTO: 0.02 THOUSAND/UL (ref 0–0.2)
IMM GRANULOCYTES NFR BLD AUTO: 0 % (ref 0–2)
KETONES UR STRIP-MCNC: ABNORMAL MG/DL
LEUKOCYTE ESTERASE UR QL STRIP: ABNORMAL
LYMPHOCYTES # BLD AUTO: 0.94 THOUSANDS/ΜL (ref 0.6–4.47)
LYMPHOCYTES NFR BLD AUTO: 16 % (ref 14–44)
MCH RBC QN AUTO: 30.4 PG (ref 26.8–34.3)
MCHC RBC AUTO-ENTMCNC: 33.9 G/DL (ref 31.4–37.4)
MCV RBC AUTO: 90 FL (ref 82–98)
MONOCYTES # BLD AUTO: 0.3 THOUSAND/ΜL (ref 0.17–1.22)
MONOCYTES NFR BLD AUTO: 5 % (ref 4–12)
NEUTROPHILS # BLD AUTO: 4.49 THOUSANDS/ΜL (ref 1.85–7.62)
NEUTS SEG NFR BLD AUTO: 77 % (ref 43–75)
NITRITE UR QL STRIP: NEGATIVE
NON-SQ EPI CELLS URNS QL MICRO: ABNORMAL /HPF
NRBC BLD AUTO-RTO: 0 /100 WBCS
PH UR STRIP.AUTO: 8 [PH] (ref 4.5–8)
PLATELET # BLD AUTO: 216 THOUSANDS/UL (ref 149–390)
PMV BLD AUTO: 9.4 FL (ref 8.9–12.7)
PROT UR STRIP-MCNC: NEGATIVE MG/DL
RBC # BLD AUTO: 3.95 MILLION/UL (ref 3.81–5.12)
RBC #/AREA URNS AUTO: ABNORMAL /HPF
RH BLD: POSITIVE
RUBV IGG SERPL IA-ACNC: 150.5 IU/ML
SP GR UR STRIP.AUTO: 1.02 (ref 1–1.03)
SPECIMEN EXPIRATION DATE: NORMAL
UROBILINOGEN UR QL STRIP.AUTO: 0.2 E.U./DL
WBC # BLD AUTO: 5.9 THOUSAND/UL (ref 4.31–10.16)
WBC #/AREA URNS AUTO: ABNORMAL /HPF

## 2019-02-28 PROCEDURE — 80081 OBSTETRIC PANEL INC HIV TSTG: CPT

## 2019-02-28 PROCEDURE — 87086 URINE CULTURE/COLONY COUNT: CPT

## 2019-02-28 PROCEDURE — 36415 COLL VENOUS BLD VENIPUNCTURE: CPT

## 2019-02-28 PROCEDURE — 81001 URINALYSIS AUTO W/SCOPE: CPT

## 2019-03-01 LAB
BACTERIA UR CULT: NORMAL
HIV 1+2 AB+HIV1 P24 AG SERPL QL IA: NORMAL
RPR SER QL: NORMAL

## 2019-03-13 ENCOUNTER — ROUTINE PRENATAL (OUTPATIENT)
Dept: PERINATAL CARE | Facility: CLINIC | Age: 29
End: 2019-03-13
Payer: COMMERCIAL

## 2019-03-13 VITALS
HEART RATE: 116 BPM | HEIGHT: 63 IN | DIASTOLIC BLOOD PRESSURE: 64 MMHG | SYSTOLIC BLOOD PRESSURE: 98 MMHG | WEIGHT: 121.4 LBS | BODY MASS INDEX: 21.51 KG/M2

## 2019-03-13 DIAGNOSIS — O44.40 LOW-LYING PLACENTA: Primary | ICD-10-CM

## 2019-03-13 DIAGNOSIS — Z34.92 PREGNANT AND NOT YET DELIVERED IN SECOND TRIMESTER: ICD-10-CM

## 2019-03-13 DIAGNOSIS — Z3A.22 22 WEEKS GESTATION OF PREGNANCY: ICD-10-CM

## 2019-03-13 PROCEDURE — 76811 OB US DETAILED SNGL FETUS: CPT | Performed by: OBSTETRICS & GYNECOLOGY

## 2019-03-13 PROCEDURE — 76817 TRANSVAGINAL US OBSTETRIC: CPT | Performed by: OBSTETRICS & GYNECOLOGY

## 2019-03-13 NOTE — PROGRESS NOTES
A transvaginal ultrasound was performed  Sonographer note on use of High Level Disinfection Process (Trophon) for transvaginal probe#1 used, serial [id number]    Jairo Shah RDMS

## 2019-03-21 ENCOUNTER — ROUTINE PRENATAL (OUTPATIENT)
Dept: OBGYN CLINIC | Facility: CLINIC | Age: 29
End: 2019-03-21

## 2019-03-21 VITALS
HEIGHT: 63 IN | HEART RATE: 107 BPM | SYSTOLIC BLOOD PRESSURE: 110 MMHG | WEIGHT: 124 LBS | DIASTOLIC BLOOD PRESSURE: 62 MMHG | BODY MASS INDEX: 21.97 KG/M2

## 2019-03-21 DIAGNOSIS — Z34.92 PRENATAL CARE IN SECOND TRIMESTER: Primary | ICD-10-CM

## 2019-03-21 LAB
SL AMB  POCT GLUCOSE, UA: NORMAL
SL AMB POCT URINE PROTEIN: NORMAL

## 2019-03-21 PROCEDURE — 81002 URINALYSIS NONAUTO W/O SCOPE: CPT | Performed by: OBSTETRICS & GYNECOLOGY

## 2019-03-21 PROCEDURE — 99214 OFFICE O/P EST MOD 30 MIN: CPT | Performed by: OBSTETRICS & GYNECOLOGY

## 2019-03-21 RX ORDER — MELATONIN
1000 2 TIMES DAILY
Qty: 90 TABLET | Refills: 3 | Status: SHIPPED | OUTPATIENT
Start: 2019-03-21 | End: 2020-06-10

## 2019-03-21 NOTE — PROGRESS NOTES
OB/GYN  PN Visit  Ced Taylor  [id number]  3/21/2019  10:39 AM  Dr Kenji Mcduffie MD    S: 30 y o  [id number] 23w1d here for PN visit  She does not have any complaints today, decline contractions, vaginal bleeding and LOF  Reporting good fetal movements  OB complaints:  Ctxns: no  Leakage: no  Bleeding: no  Fetal movement: good    O:  Vitals:    03/21/19 1000   BP: 110/62   Pulse: (!) 107       Gen: no acute distress, nonlabored breathing  Fundal height: 23cm  FHT: 142  Presentation: Not checked    A/P:    23 week pregnancy, uncomplicated course, She has had 2nd trimester US and will be recheck for fetal heart which was unable to see due to fetal position  Patient decline genetic screening previously, she would keep baby in anyway  Next visit 1 month 1h Glucola and Tdap will be given   She is using prenatal vitamins and Calcium tb     16-18 weeks: sequential screening, level II ultrasound order, flu vaccine  26-28 weeks: 28 week labs (CBC, RPR, 1hr GTT), Rh status/rhogam, FKC, flu vaccine  28-32 weeks: tdap, flu vaccine  32 weeks: tdap, flu vaccine, check in card, rediscuss contraception, birth plan  36 weeks: collect GBS/PCN allergy?, flu vaccine    Kenji Mcduffie MD  3/66/1921  33:31 AM

## 2019-05-08 ENCOUNTER — ULTRASOUND (OUTPATIENT)
Dept: PERINATAL CARE | Facility: CLINIC | Age: 29
End: 2019-05-08
Payer: COMMERCIAL

## 2019-05-08 VITALS
BODY MASS INDEX: 23.39 KG/M2 | WEIGHT: 132 LBS | DIASTOLIC BLOOD PRESSURE: 74 MMHG | HEIGHT: 63 IN | HEART RATE: 90 BPM | SYSTOLIC BLOOD PRESSURE: 106 MMHG

## 2019-05-08 DIAGNOSIS — Z03.72 SUSPECTED PROBLEM WITH PLACENTA NOT FOUND: Primary | ICD-10-CM

## 2019-05-08 DIAGNOSIS — Z3A.30 30 WEEKS GESTATION OF PREGNANCY: ICD-10-CM

## 2019-05-08 PROCEDURE — 76816 OB US FOLLOW-UP PER FETUS: CPT | Performed by: OBSTETRICS & GYNECOLOGY

## 2019-05-16 ENCOUNTER — ROUTINE PRENATAL (OUTPATIENT)
Dept: OBGYN CLINIC | Facility: CLINIC | Age: 29
End: 2019-05-16

## 2019-05-16 ENCOUNTER — APPOINTMENT (OUTPATIENT)
Dept: LAB | Facility: HOSPITAL | Age: 29
End: 2019-05-16
Payer: COMMERCIAL

## 2019-05-16 VITALS
HEART RATE: 108 BPM | HEIGHT: 63 IN | SYSTOLIC BLOOD PRESSURE: 107 MMHG | WEIGHT: 130 LBS | BODY MASS INDEX: 23.04 KG/M2 | DIASTOLIC BLOOD PRESSURE: 75 MMHG

## 2019-05-16 DIAGNOSIS — Z3A.31 31 WEEKS GESTATION OF PREGNANCY: Chronic | ICD-10-CM

## 2019-05-16 DIAGNOSIS — Z3A.31 31 WEEKS GESTATION OF PREGNANCY: Primary | Chronic | ICD-10-CM

## 2019-05-16 LAB — GLUCOSE 1H P 50 G GLC PO SERPL-MCNC: 89 MG/DL

## 2019-05-16 PROCEDURE — 90715 TDAP VACCINE 7 YRS/> IM: CPT | Performed by: OBSTETRICS & GYNECOLOGY

## 2019-05-16 PROCEDURE — 82950 GLUCOSE TEST: CPT

## 2019-05-16 PROCEDURE — 99214 OFFICE O/P EST MOD 30 MIN: CPT | Performed by: OBSTETRICS & GYNECOLOGY

## 2019-05-16 PROCEDURE — 36415 COLL VENOUS BLD VENIPUNCTURE: CPT

## 2019-05-16 PROCEDURE — 90471 IMMUNIZATION ADMIN: CPT | Performed by: OBSTETRICS & GYNECOLOGY

## 2019-05-30 ENCOUNTER — ROUTINE PRENATAL (OUTPATIENT)
Dept: OBGYN CLINIC | Facility: CLINIC | Age: 29
End: 2019-05-30

## 2019-05-30 VITALS
HEIGHT: 63 IN | HEART RATE: 108 BPM | WEIGHT: 129 LBS | BODY MASS INDEX: 22.86 KG/M2 | SYSTOLIC BLOOD PRESSURE: 111 MMHG | DIASTOLIC BLOOD PRESSURE: 77 MMHG

## 2019-05-30 DIAGNOSIS — O26.843 FUNDAL HEIGHT LOW FOR DATES IN THIRD TRIMESTER: Chronic | ICD-10-CM

## 2019-05-30 DIAGNOSIS — Z3A.33 33 WEEKS GESTATION OF PREGNANCY: Primary | Chronic | ICD-10-CM

## 2019-05-30 PROBLEM — O44.40 LOW-LYING PLACENTA: Status: ACTIVE | Noted: 2019-05-30

## 2019-05-30 LAB
SL AMB  POCT GLUCOSE, UA: NORMAL
SL AMB POCT URINE PROTEIN: NORMAL

## 2019-05-30 PROCEDURE — 81002 URINALYSIS NONAUTO W/O SCOPE: CPT | Performed by: OBSTETRICS & GYNECOLOGY

## 2019-05-30 PROCEDURE — 99214 OFFICE O/P EST MOD 30 MIN: CPT | Performed by: OBSTETRICS & GYNECOLOGY

## 2019-06-17 ENCOUNTER — ULTRASOUND (OUTPATIENT)
Dept: PERINATAL CARE | Facility: CLINIC | Age: 29
End: 2019-06-17
Payer: COMMERCIAL

## 2019-06-17 VITALS
DIASTOLIC BLOOD PRESSURE: 62 MMHG | HEART RATE: 102 BPM | SYSTOLIC BLOOD PRESSURE: 95 MMHG | HEIGHT: 63 IN | BODY MASS INDEX: 22.85 KG/M2

## 2019-06-17 DIAGNOSIS — Z3A.35 35 WEEKS GESTATION OF PREGNANCY: ICD-10-CM

## 2019-06-17 DIAGNOSIS — O26.843 UTERINE SIZE DATE DISCREPANCY PREGNANCY, THIRD TRIMESTER: Primary | ICD-10-CM

## 2019-06-17 PROCEDURE — 76816 OB US FOLLOW-UP PER FETUS: CPT | Performed by: OBSTETRICS & GYNECOLOGY

## 2019-06-18 PROBLEM — Z3A.35 35 WEEKS GESTATION OF PREGNANCY: Status: ACTIVE | Noted: 2019-05-16

## 2019-07-08 ENCOUNTER — PATIENT OUTREACH (OUTPATIENT)
Dept: OBGYN CLINIC | Facility: CLINIC | Age: 29
End: 2019-07-08

## 2019-07-10 PROBLEM — Z3A.39 39 WEEKS GESTATION OF PREGNANCY: Status: ACTIVE | Noted: 2019-05-16

## 2019-07-10 NOTE — PATIENT INSTRUCTIONS
Kick Counts in Pregnancy   AMBULATORY CARE:   Kick counts  measure how much your baby is moving in your womb  A kick from your baby can be felt as a twist, turn, swish, roll, or jab  It is common to feel your baby kicking at 26 to 28 weeks of pregnancy  You may feel your baby kick as early as 20 weeks of pregnancy  Seek care immediately if:   · You feel your baby kick less as the day goes on      · You do not feel any kicks in a day  Contact your healthcare provider if:   · You feel a change in the number of kicks or movements of your baby  · You feel fewer than 10 kicks within 2 hours after counting twice  · You have questions or concerns about your baby's movements  Why measure kick counts:  Your baby's movement may provide information about your baby's health  He may move less, or not at all, if there are problems  He may move less if he does not have enough room to grow in your uterus (womb)  He may also move less if he is not getting enough oxygen or nutrition from the placenta  Tell your healthcare provider as soon as you feel a change in your baby's movements  Problems that are found earlier are easier to treat  When to measure kick counts:   · Measure kick counts at the same time every day  · Measure kick counts when your baby is awake and most active  Your baby may be most active in the evening  · Measure kick counts after a meal or snack  Your baby may be more active after you eat  Wait 2 hours after you drink liquids that contain caffeine  Caffeine can make your baby more active than usual     · You should not smoke while you are pregnant  Smoking increases the risk of health problems for you and for your baby during your pregnancy  If you do smoke, wait 2 hours to measure kick counts  Nicotine can make your baby more active than usual   How to measure kick counts:  Check that your baby is awake before you measure kick counts   You can wake up your baby by lightly pushing on your belly, walking, or drinking something cold  Your healthcare provider may tell you different ways to measure kick counts  He may tell you to do the following:  · Use a chart or clock to keep track of the time you start and finish counting  · Sit in a chair or lie on your left side  · Place your hands on the largest part of your belly  · Count until you reach 10 kicks  Write down how much time it takes to count 10 kicks  · It may take 30 minutes to 2 hours to count 10 kicks  It should not take more than 2 hours to count 10 kicks  · If you do not feel 10 kicks within 2 hours, wait 1 hour and count again  Your baby can sleep for up to 40 minutes at one time  Follow up with your healthcare provider as directed:  Write down your questions so you remember to ask them during your visits  © 2017 [address] Information is for End User's use only and may not be sold, redistributed or otherwise used for commercial purposes  All illustrations and images included in CareNotes® are the copyrighted property of [employer]  or Devendra Thomas  The above information is an  only  It is not intended as medical advice for individual conditions or treatments  Talk to your doctor, nurse or pharmacist before following any medical regimen to see if it is safe and effective for you  Pregnancy at 44 to 40 Weeks   [address]:   What changes are happening in my body? You are now getting close to your due date  Your due date is just an estimate of when your baby will be born  Your baby may be born before or after your due date  Your breathing may be easier if your baby has moved down into a head-down position  You may need to urinate more often because the baby may be pressing on your bladder  You may also feel more discomfort and tire easily  You may also be having trouble sleeping  How do I care for myself at this stage of my pregnancy?    · Eat a variety of healthy foods   Healthy foods include fruits, vegetables, whole-grain breads, low-fat dairy foods, beans, lean meats, and fish  Drink liquids as directed  Ask how much liquid to drink each day and which liquids are best for you  Limit caffeine to less than 200 milligrams each day  Limit your intake of fish to 2 servings each week  Choose fish low in mercury such as canned light tuna, shrimp, crab, salmon, cod, or tilapia  Do not  eat fish high in mercury such as swordfish, tilefish, randy mackerel, and shark  · Take prenatal vitamins as directed  Your need for certain vitamins and minerals, such as folic acid, increases during pregnancy  Prenatal vitamins provide some of the extra vitamins and minerals you need  Prenatal vitamins may also help to decrease the risk of certain birth defects  · Rest as needed  Put your feet up if you have swelling in your ankles and feet  · Do not smoke  If you smoke, it is never too late to quit  Smoking increases your risk of a miscarriage and other health problems during your pregnancy  Smoking can cause your baby to be born too early or weigh less at birth  Ask your healthcare provider for information if you need help quitting  · Do not drink alcohol  Alcohol passes from your body to your baby through the placenta  It can affect your baby's brain development and cause fetal alcohol syndrome (FAS)  FAS is a group of conditions that causes mental, behavior, and growth problems  · Talk to your healthcare provider before you take any medicines  Many medicines may harm your baby if you take them when you are pregnant  Do not take any medicines, vitamins, herbs, or supplements without first talking to your healthcare provider  Never use illegal or street drugs (such as marijuana or cocaine) while you are pregnant  · Talk to your healthcare provider before you travel  You may not be able to travel in an airplane after 36 weeks   He may also recommend that you avoid long road trips  What are some safety tips during pregnancy? · Avoid hot tubs and saunas  Do not use a hot tub or sauna while you are pregnant, especially during your first trimester  Hot tubs and saunas may raise your baby's temperature and increase the risk of birth defects  · Avoid toxoplasmosis  This is an infection caused by eating raw meat or being around infected cat feces  It can cause birth defects, miscarriages, and other problems  Wash your hands after you touch raw meat  Make sure any meat is well-cooked before you eat it  Avoid raw eggs and unpasteurized milk  Use gloves or ask someone else to clean your cat's litter box while you are pregnant  · Ask your healthcare provider about travel  The most comfortable time to travel is during the second trimester  Ask your healthcare provider if you can travel after 36 weeks  You may not be able to travel in an airplane after 36 weeks  He may also recommend that you avoid long road trips  What changes are happening with my baby? Your baby is ready to be born  At birth, your baby may weigh about 6 to 9 pounds and be about 19 to 21 inches long  Your baby may be in a head-down position  Your baby will also rest lower in your abdomen  What do I need to know about prenatal care? Your healthcare provider will check your blood pressure and weight  You may also need the following:  · A urine test  may also be done to check for sugar and protein  These can be signs of gestational diabetes or infection  Protein in your urine may also be a sign of preeclampsia  Preeclampsia is a condition that can develop during week 20 or later of your pregnancy  It causes high blood pressure, and it can cause problems with your kidneys and other organs  · Your baby's heart rate  will be checked  When should I seek immediate care? · You develop a severe headache that does not go away      · You have new or increased vision changes, such as blurred or spotted vision  · You have new or increased swelling in your face or hands  · You have vaginal spotting or bleeding  · Your water broke or you feel warm water gushing or trickling from your vagina  When should I contact my healthcare provider? · You have more than 5 contractions in 1 hour  · You notice any changes in your baby's movements  · You have abdominal cramps, pressure, or tightening  · You have a change in vaginal discharge  · You have chills or a fever  · You have vaginal itching, burning, or pain  · You have yellow, green, white, or foul-smelling vaginal discharge  · You have pain or burning when you urinate, less urine than usual, or pink or bloody urine  · You have questions or concerns about your condition or care  CARE AGREEMENT:   You have the right to help plan your care  Learn about your health condition and how it may be treated  Discuss treatment options with your caregivers to decide what care you want to receive  You always have the right to refuse treatment  The above information is an  only  It is not intended as medical advice for individual conditions or treatments  Talk to your doctor, nurse or pharmacist before following any medical regimen to see if it is safe and effective for you  © 2017 [facility] [facility] Information is for End User's use only and may not be sold, redistributed or otherwise used for commercial purposes  All illustrations and images included in CareNotes® are the copyrighted property of [employer] M [employer]  or Devendra Thomas

## 2019-07-11 ENCOUNTER — ROUTINE PRENATAL (OUTPATIENT)
Dept: OBGYN CLINIC | Facility: CLINIC | Age: 29
End: 2019-07-11

## 2019-07-11 VITALS
HEART RATE: 86 BPM | HEIGHT: 63 IN | SYSTOLIC BLOOD PRESSURE: 104 MMHG | WEIGHT: 133 LBS | BODY MASS INDEX: 23.57 KG/M2 | DIASTOLIC BLOOD PRESSURE: 61 MMHG

## 2019-07-11 DIAGNOSIS — O09.33 INSUFFICIENT PRENATAL CARE IN THIRD TRIMESTER: ICD-10-CM

## 2019-07-11 DIAGNOSIS — Z3A.39 39 WEEKS GESTATION OF PREGNANCY: ICD-10-CM

## 2019-07-11 DIAGNOSIS — O44.40 LOW-LYING PLACENTA: Primary | ICD-10-CM

## 2019-07-11 DIAGNOSIS — O26.843 FUNDAL HEIGHT LOW FOR DATES IN THIRD TRIMESTER: Chronic | ICD-10-CM

## 2019-07-11 PROCEDURE — 87653 STREP B DNA AMP PROBE: CPT | Performed by: NURSE PRACTITIONER

## 2019-07-11 PROCEDURE — 99214 OFFICE O/P EST MOD 30 MIN: CPT | Performed by: NURSE PRACTITIONER

## 2019-07-11 NOTE — PROGRESS NOTES
Denies loss of fluid, vaginal bleeding and abdominal pain  Confirms frequent fetal movement, doing fetal kick counts daily  Tolerating prenatal vitamin and vitamin-D well  Patient has not been seen in office since 33w1d  Denies concerns or questions  Birth plan reviewed-no special request   Patient tolerated vaginal exam well-desires vaginal exam as she was undressed to have GBS collected   Center ultrasound reviewed-19-vertex presentation, normal appearing fetal growth, posterior placenta, no placenta previa, MOIRA-WNL and EFW 2762g/6 lb 1 oz (45%)  Follow-up only as clinically indicated  Plan:  1  Continue prenatal vitamins daily  2  Continue fetal kick counts daily  3  Vaginal/perineal massage reviewed, encouraged 1-4 times per week and written information provided  4  Uterine size/date discrepancy-ultrasound WNL  5  GBS collected-no penicillin allergy  6  Common discomforts of pregnancy and precautions reviewed    Signs and symptoms of labor reviewed  RTO 1 week

## 2019-07-13 LAB — GP B STREP DNA SPEC QL NAA+PROBE: NORMAL

## 2019-07-17 PROBLEM — Z3A.40 40 WEEKS GESTATION OF PREGNANCY: Status: ACTIVE | Noted: 2019-05-16

## 2019-07-18 ENCOUNTER — ANESTHESIA EVENT (INPATIENT)
Dept: ANESTHESIOLOGY | Facility: HOSPITAL | Age: 29
DRG: 560 | End: 2019-07-18
Payer: COMMERCIAL

## 2019-07-18 ENCOUNTER — ROUTINE PRENATAL (OUTPATIENT)
Dept: OBGYN CLINIC | Facility: CLINIC | Age: 29
End: 2019-07-18

## 2019-07-18 ENCOUNTER — ANESTHESIA (INPATIENT)
Dept: ANESTHESIOLOGY | Facility: HOSPITAL | Age: 29
DRG: 560 | End: 2019-07-18
Payer: COMMERCIAL

## 2019-07-18 ENCOUNTER — HOSPITAL ENCOUNTER (INPATIENT)
Facility: HOSPITAL | Age: 29
LOS: 3 days | Discharge: HOME | DRG: 560 | End: 2019-07-21
Attending: OBSTETRICS & GYNECOLOGY | Admitting: OBSTETRICS & GYNECOLOGY
Payer: COMMERCIAL

## 2019-07-18 VITALS
SYSTOLIC BLOOD PRESSURE: 114 MMHG | BODY MASS INDEX: 24.1 KG/M2 | HEART RATE: 90 BPM | HEIGHT: 63 IN | WEIGHT: 136 LBS | DIASTOLIC BLOOD PRESSURE: 76 MMHG

## 2019-07-18 DIAGNOSIS — Z3A.40 40 WEEKS GESTATION OF PREGNANCY: Primary | ICD-10-CM

## 2019-07-18 LAB
ABO GROUP BLD: NORMAL
AMPHETAMINES SERPL QL SCN: NEGATIVE
BARBITURATES UR QL: NEGATIVE
BENZODIAZ UR QL: NEGATIVE
BLD GP AB SCN SERPL QL: NEGATIVE
COCAINE UR QL: NEGATIVE
ERYTHROCYTE [DISTWIDTH] IN BLOOD BY AUTOMATED COUNT: 15.2 % (ref 11.6–15.1)
HCT VFR BLD AUTO: 36.8 % (ref 34.8–46.1)
HGB BLD-MCNC: 12.1 G/DL (ref 11.5–15.4)
MCH RBC QN AUTO: 29.8 PG (ref 26.8–34.3)
MCHC RBC AUTO-ENTMCNC: 32.9 G/DL (ref 31.4–37.4)
MCV RBC AUTO: 91 FL (ref 82–98)
METHADONE UR QL: NEGATIVE
OPIATES UR QL SCN: NEGATIVE
PCP UR QL: NEGATIVE
PLATELET # BLD AUTO: 194 THOUSANDS/UL (ref 149–390)
PMV BLD AUTO: 9.9 FL (ref 8.9–12.7)
RBC # BLD AUTO: 4.06 MILLION/UL (ref 3.81–5.12)
RH BLD: POSITIVE
SL AMB  POCT GLUCOSE, UA: NORMAL
SL AMB POCT URINE PROTEIN: NORMAL
SPECIMEN EXPIRATION DATE: NORMAL
THC UR QL: NEGATIVE
WBC # BLD AUTO: 10.19 THOUSAND/UL (ref 4.31–10.16)

## 2019-07-18 PROCEDURE — 99214 OFFICE O/P EST MOD 30 MIN: CPT

## 2019-07-18 PROCEDURE — 86850 RBC ANTIBODY SCREEN: CPT | Performed by: OBSTETRICS & GYNECOLOGY

## 2019-07-18 PROCEDURE — 85027 COMPLETE CBC AUTOMATED: CPT | Performed by: OBSTETRICS & GYNECOLOGY

## 2019-07-18 PROCEDURE — 80307 DRUG TEST PRSMV CHEM ANLYZR: CPT | Performed by: OBSTETRICS & GYNECOLOGY

## 2019-07-18 PROCEDURE — 10907ZC DRAINAGE OF AMNIOTIC FLUID, THERAPEUTIC FROM PRODUCTS OF CONCEPTION, VIA NATURAL OR ARTIFICIAL OPENING: ICD-10-PCS | Performed by: OBSTETRICS & GYNECOLOGY

## 2019-07-18 PROCEDURE — 99213 OFFICE O/P EST LOW 20 MIN: CPT | Performed by: OBSTETRICS & GYNECOLOGY

## 2019-07-18 PROCEDURE — 86900 BLOOD TYPING SEROLOGIC ABO: CPT | Performed by: OBSTETRICS & GYNECOLOGY

## 2019-07-18 PROCEDURE — 4A1HXCZ MONITORING OF PRODUCTS OF CONCEPTION, CARDIAC RATE, EXTERNAL APPROACH: ICD-10-PCS | Performed by: OBSTETRICS & GYNECOLOGY

## 2019-07-18 PROCEDURE — 86901 BLOOD TYPING SEROLOGIC RH(D): CPT | Performed by: OBSTETRICS & GYNECOLOGY

## 2019-07-18 PROCEDURE — 86592 SYPHILIS TEST NON-TREP QUAL: CPT | Performed by: OBSTETRICS & GYNECOLOGY

## 2019-07-18 PROCEDURE — 81002 URINALYSIS NONAUTO W/O SCOPE: CPT | Performed by: OBSTETRICS & GYNECOLOGY

## 2019-07-18 PROCEDURE — NC001 PR NO CHARGE: Performed by: OBSTETRICS & GYNECOLOGY

## 2019-07-18 RX ORDER — ONDANSETRON 2 MG/ML
4 INJECTION INTRAMUSCULAR; INTRAVENOUS EVERY 6 HOURS PRN
Status: DISCONTINUED | OUTPATIENT
Start: 2019-07-18 | End: 2019-07-19

## 2019-07-18 RX ORDER — METOCLOPRAMIDE HYDROCHLORIDE 5 MG/ML
5 INJECTION INTRAMUSCULAR; INTRAVENOUS EVERY 6 HOURS PRN
Status: DISCONTINUED | OUTPATIENT
Start: 2019-07-18 | End: 2019-07-19

## 2019-07-18 RX ORDER — ONDANSETRON 2 MG/ML
4 INJECTION INTRAMUSCULAR; INTRAVENOUS EVERY 4 HOURS PRN
Status: DISCONTINUED | OUTPATIENT
Start: 2019-07-18 | End: 2019-07-19

## 2019-07-18 RX ORDER — DIPHENHYDRAMINE HYDROCHLORIDE 50 MG/ML
25 INJECTION INTRAMUSCULAR; INTRAVENOUS EVERY 6 HOURS PRN
Status: DISCONTINUED | OUTPATIENT
Start: 2019-07-18 | End: 2019-07-19

## 2019-07-18 RX ORDER — SODIUM CHLORIDE, SODIUM LACTATE, POTASSIUM CHLORIDE, CALCIUM CHLORIDE 600; 310; 30; 20 MG/100ML; MG/100ML; MG/100ML; MG/100ML
125 INJECTION, SOLUTION INTRAVENOUS CONTINUOUS
Status: DISCONTINUED | OUTPATIENT
Start: 2019-07-18 | End: 2019-07-19

## 2019-07-18 RX ORDER — BUPIVACAINE HYDROCHLORIDE 7.5 MG/ML
INJECTION, SOLUTION EPIDURAL; RETROBULBAR AS NEEDED
Status: DISCONTINUED | OUTPATIENT
Start: 2019-07-18 | End: 2019-07-19 | Stop reason: SURG

## 2019-07-18 RX ADMIN — SODIUM CHLORIDE, SODIUM LACTATE, POTASSIUM CHLORIDE, AND CALCIUM CHLORIDE 999 ML/HR: .6; .31; .03; .02 INJECTION, SOLUTION INTRAVENOUS at 22:48

## 2019-07-18 RX ADMIN — ONDANSETRON 4 MG: 2 INJECTION INTRAMUSCULAR; INTRAVENOUS at 23:47

## 2019-07-18 RX ADMIN — ROPIVACAINE HYDROCHLORIDE: 2 INJECTION, SOLUTION EPIDURAL; INFILTRATION at 23:19

## 2019-07-18 RX ADMIN — BUPIVACAINE HYDROCHLORIDE 0.5 ML: 7.5 INJECTION, SOLUTION EPIDURAL; RETROBULBAR at 23:02

## 2019-07-18 RX ADMIN — SODIUM CHLORIDE, SODIUM LACTATE, POTASSIUM CHLORIDE, AND CALCIUM CHLORIDE 125 ML/HR: .6; .31; .03; .02 INJECTION, SOLUTION INTRAVENOUS at 22:00

## 2019-07-18 RX ADMIN — SODIUM CHLORIDE, SODIUM LACTATE, POTASSIUM CHLORIDE, AND CALCIUM CHLORIDE 125 ML/HR: .6; .31; .03; .02 INJECTION, SOLUTION INTRAVENOUS at 23:34

## 2019-07-18 NOTE — PATIENT INSTRUCTIONS
Kick Counts in Pregnancy   AMBULATORY CARE:   Kick counts  measure how much your baby is moving in your womb  A kick from your baby can be felt as a twist, turn, swish, roll, or jab  It is common to feel your baby kicking at 26 to 28 weeks of pregnancy  You may feel your baby kick as early as 20 weeks of pregnancy  Seek care immediately if:   · You feel your baby kick less as the day goes on      · You do not feel any kicks in a day  Contact your healthcare provider if:   · You feel a change in the number of kicks or movements of your baby  · You feel fewer than 10 kicks within 2 hours after counting twice  · You have questions or concerns about your baby's movements  Why measure kick counts:  Your baby's movement may provide information about your baby's health  He may move less, or not at all, if there are problems  He may move less if he does not have enough room to grow in your uterus (womb)  He may also move less if he is not getting enough oxygen or nutrition from the placenta  Tell your healthcare provider as soon as you feel a change in your baby's movements  Problems that are found earlier are easier to treat  When to measure kick counts:   · Measure kick counts at the same time every day  · Measure kick counts when your baby is awake and most active  Your baby may be most active in the evening  · Measure kick counts after a meal or snack  Your baby may be more active after you eat  Wait 2 hours after you drink liquids that contain caffeine  Caffeine can make your baby more active than usual     · You should not smoke while you are pregnant  Smoking increases the risk of health problems for you and for your baby during your pregnancy  If you do smoke, wait 2 hours to measure kick counts  Nicotine can make your baby more active than usual   How to measure kick counts:  Check that your baby is awake before you measure kick counts   You can wake up your baby by lightly pushing on your belly, walking, or drinking something cold  Your healthcare provider may tell you different ways to measure kick counts  He may tell you to do the following:  · Use a chart or clock to keep track of the time you start and finish counting  · Sit in a chair or lie on your left side  · Place your hands on the largest part of your belly  · Count until you reach 10 kicks  Write down how much time it takes to count 10 kicks  · It may take 30 minutes to 2 hours to count 10 kicks  It should not take more than 2 hours to count 10 kicks  · If you do not feel 10 kicks within 2 hours, wait 1 hour and count again  Your baby can sleep for up to 40 minutes at one time

## 2019-07-18 NOTE — PROGRESS NOTES
Assessment & Plan  30 y o  [id number] at 40w0d presenting for routine prenatal visit  Problem List Items Addressed This Visit        Other    40 weeks gestation of pregnancy - Primary     Discussed IOL, patient interested, but still would like to go into labor on her own  SVE, stripped membranes  Was not induced with previous babies,  x2  Pelvis proven to 8 1/2 lbs (per patient)  Induction process explained, consent form signed  IOL scheduled for     Declines contraception  Relevant Orders    POCT urine dip (Completed)        ____________________________________________________________  Subjective  She is without complaint  She admits to contractions, loss of fluid, or vaginal bleeding  She feels regular fetal movements        Objective  /76   Pulse 90   Ht 5' 3" (1 6 m)   Wt 61 7 kg (136 lb)   LMP 10/10/2018 (Exact Date)   BMI 24 09 kg/m²   FHR: 130s  SVE: 3 /-2    Patient's Active Problem List  Patient Active Problem List   Diagnosis    40 weeks gestation of pregnancy    History of low-lying placenta, resolved    Fundal height low for dates in third trimester    Insufficient prenatal care in third trimester           Kyle Dubois MD  OB/GYN PGY-2  2019  11:44 AM

## 2019-07-18 NOTE — ASSESSMENT & PLAN NOTE
Discussed IOL, patient interested, but still would like to go into labor on her own  SVE, stripped membranes  Was not induced with previous babies,  x2  Pelvis proven to 8 1/2 lbs (per patient)  Induction process explained, consent form signed  IOL scheduled for     Declines contraception

## 2019-07-19 LAB
BASE EXCESS BLDCOA CALC-SCNC: -7 MMOL/L (ref 3–11)
BASE EXCESS BLDCOV CALC-SCNC: -4 MMOL/L (ref 1–9)
HCO3 BLDCOA-SCNC: 23.4 MMOL/L (ref 17.3–27.3)
HCO3 BLDCOV-SCNC: 21.1 MMOL/L (ref 12.2–28.6)
O2 CT VFR BLDCOA CALC: 7.7 ML/DL
OXYHGB MFR BLDCOA: 33.5 %
OXYHGB MFR BLDCOV: 83.3 %
PCO2 BLDCOA: 67.7 MM[HG] (ref 30–60)
PCO2 BLDCOV: 39 MM HG (ref 27–43)
PH BLDCOA: 7.16 [PH] (ref 7.23–7.43)
PH BLDCOV: 7.35 [PH] (ref 7.19–7.49)
PO2 BLDCOA: 20.1 MM HG (ref 5–25)
PO2 BLDCOV: 39.1 MM HG (ref 15–45)
RPR SER QL: NORMAL
SAO2 % BLDCOV: 19.9 ML/DL

## 2019-07-19 PROCEDURE — 59409 OBSTETRICAL CARE: CPT | Performed by: OBSTETRICS & GYNECOLOGY

## 2019-07-19 PROCEDURE — 82805 BLOOD GASES W/O2 SATURATION: CPT | Performed by: OBSTETRICS & GYNECOLOGY

## 2019-07-19 PROCEDURE — 99024 POSTOP FOLLOW-UP VISIT: CPT | Performed by: OBSTETRICS & GYNECOLOGY

## 2019-07-19 RX ORDER — DIAPER,BRIEF,INFANT-TODD,DISP
1 EACH MISCELLANEOUS 4 TIMES DAILY PRN
Status: DISCONTINUED | OUTPATIENT
Start: 2019-07-19 | End: 2019-07-21 | Stop reason: HOSPADM

## 2019-07-19 RX ORDER — CALCIUM CARBONATE 200(500)MG
1000 TABLET,CHEWABLE ORAL DAILY PRN
Status: DISCONTINUED | OUTPATIENT
Start: 2019-07-19 | End: 2019-07-21 | Stop reason: HOSPADM

## 2019-07-19 RX ORDER — OXYTOCIN/RINGER'S LACTATE 30/500 ML
250 PLASTIC BAG, INJECTION (ML) INTRAVENOUS CONTINUOUS
Status: ACTIVE | OUTPATIENT
Start: 2019-07-19 | End: 2019-07-19

## 2019-07-19 RX ORDER — OXYCODONE HYDROCHLORIDE 5 MG/1
5 TABLET ORAL EVERY 4 HOURS PRN
Status: DISCONTINUED | OUTPATIENT
Start: 2019-07-19 | End: 2019-07-21 | Stop reason: HOSPADM

## 2019-07-19 RX ORDER — OXYTOCIN/RINGER'S LACTATE 30/500 ML
PLASTIC BAG, INJECTION (ML) INTRAVENOUS
Status: COMPLETED
Start: 2019-07-19 | End: 2019-07-19

## 2019-07-19 RX ORDER — IBUPROFEN 600 MG/1
600 TABLET ORAL EVERY 6 HOURS PRN
Status: DISCONTINUED | OUTPATIENT
Start: 2019-07-19 | End: 2019-07-21 | Stop reason: HOSPADM

## 2019-07-19 RX ORDER — DIPHENHYDRAMINE HYDROCHLORIDE 50 MG/ML
25 INJECTION INTRAMUSCULAR; INTRAVENOUS EVERY 6 HOURS PRN
Status: DISCONTINUED | OUTPATIENT
Start: 2019-07-19 | End: 2019-07-21 | Stop reason: HOSPADM

## 2019-07-19 RX ORDER — ONDANSETRON 2 MG/ML
4 INJECTION INTRAMUSCULAR; INTRAVENOUS EVERY 8 HOURS PRN
Status: DISCONTINUED | OUTPATIENT
Start: 2019-07-19 | End: 2019-07-21 | Stop reason: HOSPADM

## 2019-07-19 RX ORDER — ACETAMINOPHEN 325 MG/1
650 TABLET ORAL EVERY 4 HOURS PRN
Status: DISCONTINUED | OUTPATIENT
Start: 2019-07-19 | End: 2019-07-21 | Stop reason: HOSPADM

## 2019-07-19 RX ORDER — DOCUSATE SODIUM 100 MG/1
100 CAPSULE, LIQUID FILLED ORAL 2 TIMES DAILY
Status: DISCONTINUED | OUTPATIENT
Start: 2019-07-19 | End: 2019-07-21 | Stop reason: HOSPADM

## 2019-07-19 RX ADMIN — HYDROCORTISONE 1 APPLICATION: 1 CREAM TOPICAL at 04:24

## 2019-07-19 RX ADMIN — Medication 250 UNITS: at 01:50

## 2019-07-19 RX ADMIN — BENZOCAINE AND LEVOMENTHOL: 200; 5 SPRAY TOPICAL at 04:24

## 2019-07-19 RX ADMIN — WITCH HAZEL 1 PAD: 500 SOLUTION RECTAL; TOPICAL at 04:24

## 2019-07-19 RX ADMIN — METOCLOPRAMIDE 5 MG: 5 INJECTION, SOLUTION INTRAMUSCULAR; INTRAVENOUS at 02:20

## 2019-07-19 NOTE — H&P
H&P Exam - Obstetrics   Neeru Oliva 30 y o  female MRN: [id number]  Unit/Bed#: -01 Encounter: [id number]      History of Present Illness     Chief Complaint: Active labor    HPI:  Neeru Oliva is a 30 y o  [id number] female with an GLENNY of 2019, by Last Menstrual Period at 40w1d weeks gestation who is being admitted for labor  Contractions: yes  Loss of fluid: no  Vaginal bleeding: some spotting  Fetal movement: yes    She is [facility] patient  She was checked early today and had her membranes swept  She states that her contractions started at approximately 1500 and have gotten progressively worse  She denies any complciations with this pregnancy or her previuos pregnancies  She denies any significant past medical history  PREGNANCY COMPLICATIONS:   1) Late to prenatal care     OB History    Para Term  AB Living   4 2 2   1 2   SAB TAB Ectopic Multiple Live Births   1       2      # Outcome Date GA Lbr Oscar/2nd Weight Sex Delivery Anes PTL Lv   4 Current            3 SAB            2 Term      Vag-Spont   JAYSON   1 Term      Vag-Spont   JAYSON     Historical Information   Past Medical History:   Diagnosis Date    2015     History reviewed  No pertinent surgical history  Social History   Social History     Substance and Sexual Activity   Alcohol Use No     Social History     Substance and Sexual Activity   Drug Use No     Social History     Tobacco Use   Smoking Status Never Smoker   Smokeless Tobacco Never Used     Meds/Allergies      Medications Prior to Admission   Medication    cholecalciferol (VITAMIN D3) 1,000 units tablet    Prenatal Vit-Fe Fumarate-FA (PREPLUS) 27-1 MG TABS    pyridoxine (B-6) 25 MG tablet      No Known Allergies    OBJECTIVE:    Vitals: Blood pressure 110/72, pulse 82, temperature 98 °F (36 7 °C), temperature source Tympanic, resp   rate 16, height 5' 3" (1 6 m), weight 61 7 kg (136 lb), last menstrual period 10/10/2018, SpO2 97 %, currently breastfeeding  Body mass index is 24 09 kg/m²  Physical Exam   Constitutional: She is oriented to person, place, and time  She appears well-developed and well-nourished  No distress  HENT:   Head: Normocephalic and atraumatic  Cardiovascular: Normal rate, regular rhythm and normal heart sounds  Exam reveals no gallop and no friction rub  No murmur heard  Pulmonary/Chest: Effort normal and breath sounds normal  No respiratory distress  She has no wheezes  She has no rales  Abdominal: Soft  She exhibits no distension  There is no tenderness  There is no rebound and no guarding  Genitourinary:   Genitourinary Comments: Gravid uterus, nontender  Musculoskeletal: She exhibits no edema  Neurological: She is alert and oriented to person, place, and time  Skin: Skin is warm and dry  She is not diaphoretic  Psychiatric: She has a normal mood and affect   Her behavior is normal  Judgment and thought content normal      Cervix:  Dilation: 5  Effacement (%): 80  Station: -1    Fetal heart rate:   Baseline Rate: 140 bpm  Variability: Moderate 6-25 bpm  Accelerations: 15 x 15 or greater  Decelerations: Early    [place]:   Contraction Frequency (minutes): 3-4 5  Contraction Duration (seconds): 60-70  Contraction Quality: Mild    EFW: 7 5, proven to 8 5lbs    GBS: negative    Prenatal Labs:   Blood Type:   Lab Results   Component Value Date/Time    ABO Grouping A 07/18/2019 09:48 PM     , D (Rh type):   Lab Results   Component Value Date/Time    Rh Factor Positive 07/18/2019 09:48 PM     , Antibody Screen: No results found for: ANTIBODYSCR , HCT/HGB:   Lab Results   Component Value Date/Time    Hematocrit 36 8 07/18/2019 09:48 PM    Hemoglobin 12 1 07/18/2019 09:48 PM      , Platelets:   Lab Results   Component Value Date/Time    Platelets 518 38/28/4199 09:48 PM      , 1 hour Glucola:   Lab Results   Component Value Date/Time    Glucose 89 05/16/2019 03:04 PM   , Varicella: No results found for: VARICELLAIGG    , Rubella:   Lab Results   Component Value Date/Time    Rubella IgG Quant 150 5 2019 11:23 AM        , VDRL/RPR:   Lab Results   Component Value Date/Time    RPR Non-Reactive 2019 11:23 AM      , Urine Drug Screen:   Lab Results   Component Value Date/Time    Barbiturate Ur Negative 2019 09:48 PM    Benzodiazepine Urine Negative 2019 09:48 PM    THC Urine Negative 2019 09:48 PM    Cocaine Urine Negative 2019 09:48 PM    Methadone Urine Negative 2019 09:48 PM    Opiate Urine Negative 2019 09:48 PM    PCP Ur Negative 2019 09:48 PM       Invasive Devices     Peripheral Intravenous Line            Peripheral IV 19 Left;Dorsal (posterior) Arm less than 1 day          Epidural Line            Epidural Catheter 19 less than 1 day          Drain            Urethral Catheter Non-latex 16 Fr  less than 1 day                  Assessment/Plan     ASSESSMENT:  25yo  at 40w1d weeks gestation who is being admitted for labor  PLAN:   1) Admit   2) CBC, RPR, Blood Type   3) Start with Epidural and AROM   4) GBS negative status    5) Analgesia and/or epidural at patient request   6) Anticipate    7) Discussed with Dr Sanjay Lua      This patient will be an INPATIENT  and I certify the anticipated length of stay is >2 Midnights  William Razo MD  2019  12:24 AM

## 2019-07-19 NOTE — DISCHARGE SUMMARY
Discharge Summary - Jennifer Danielson 30 y o  female MRN: [id number]    Unit/Bed#: -01 Encounter: [id number]    Admission Date: 2019     Discharge Date: 19    Admitting Diagnosis:   Patient Active Problem List   Diagnosis    40 weeks gestation of pregnancy    History of low-lying placenta, resolved    Fundal height low for dates in third trimester    Insufficient prenatal care in third trimester         Discharge Diagnosis:   Same, delivered    Procedures:   spontaneous vaginal delivery    Admitting Attending: Dr Gato No MD  Delivery Attending: Dr Paiz Holding  Discharge Attending: Dr Chris Pierre Course:     Jennifer Danielson is a 30 y o  [id number] who was admitted in active labor  She was made comfortable with an epidural and artificially ruptured for clear fluid  She then underwent an uncomplicated spontaneous vaginal delivery and delivered a viable male  at 36  APGARS were 8, 9 at 1 and 5 minutes, respectively   weighed 7lb 9oz  Patient tolerated the procedure well and was transferred to postpartum in stable condition  The patient's post partum course was unremarkable  On day of discharge, she was ambulating and able to reasonably perform all ADLs  She was voiding and had appropriate bowel function  Pain was well controlled  She was discharged home on postpartum day #2 without complications  Patient was instructed to follow up with her OB as an outpatient and was given appropriate warnings to call provider if she develops signs of infection or uncontrolled pain  Condition at discharge:   good     Disposition:   Home    Planned Readmission:   No    Discharge Medications:   Prenatal vitamin daily for 6 months or the duration of nursing whichever is longer    Motrin 600 mg orally every 6 hours as needed for pain  Tylenol (over the counter) per bottle directions as needed for pain  Hydrocortisone cream 1% (over the counter) applied 1-2x daily to hemorrhoids as needed  Witch hazel pads for hemorrhoidal discomfort as needed      Discharge instructions :   -Do not place anything (no partner, tampons or douche) in your vagina for 6 weeks  -You may walk for exercise for the first 6 weeks then gradually return to your usual activities    -Please do not drive for 1 week if you have no stitches and for 2 weeks if you have stitches or underwent a  delivery     -You may take baths or shower per your preference    -Please look at your bust (breasts) in the mirror daily and call provider for redness or tenderness or increased warmth  - If you have had a  please look at your incision daily as well and call provider for increasing redness or steady drainage from the incision    -Please call your provider if temperature > 100 4*F or 38* C, worsening pain or a foul discharge      Lacey Sheffield MD  OB/GYN PGY-1  2019  7:56 AM

## 2019-07-19 NOTE — PLAN OF CARE
Problem: Potential for Falls  Goal: Patient will remain free of falls  Description  INTERVENTIONS:  - Assess patient frequently for physical needs  -  Identify cognitive and physical deficits and behaviors that affect risk of falls    -  [place] fall precautions as indicated by assessment   - Educate patient/family on patient safety including physical limitations  - Instruct patient to call for assistance with activity based on assessment  - Modify environment to reduce risk of injury  - Consider OT/PT consult to assist with strengthening/mobility  7/19/2019 1000 by Janeen Corrigan RN  Outcome: Progressing  7/19/2019 0959 by Janeen Corrigan RN  Outcome: Progressing     Problem: PAIN - ADULT  Goal: Verbalizes/displays adequate comfort level or baseline comfort level  Description  Interventions:  - Encourage patient to monitor pain and request assistance  - Assess pain using appropriate pain scale  - Administer analgesics based on type and severity of pain and evaluate response  - Implement non-pharmacological measures as appropriate and evaluate response  - Consider cultural and social influences on pain and pain management  - Notify physician/advanced practitioner if interventions unsuccessful or patient reports new pain  7/19/2019 1000 by Janeen Corrigan RN  Outcome: Progressing  7/19/2019 0959 by Janeen Corrigan RN  Outcome: Progressing     Problem: INFECTION - ADULT  Goal: Absence or prevention of progression during hospitalization  Description  INTERVENTIONS:  - Assess and monitor for signs and symptoms of infection  - Monitor lab/diagnostic results  - Monitor all insertion sites, i e  indwelling lines, tubes, and drains  - Monitor endotracheal (as able) and nasal secretions for changes in amount and color  - [place] appropriate cooling/warming therapies per order  - Administer medications as ordered  - Instruct and encourage patient and family to use good hand hygiene technique  - Identify and instruct in appropriate isolation precautions for identified infection/condition  7/19/2019 1000 by Jerrod Hodgson RN  Outcome: Progressing  7/19/2019 0959 by Jerrod Hodgson RN  Outcome: Progressing  Goal: Absence of fever/infection during neutropenic period  Description  INTERVENTIONS:  - Monitor WBC  - Implement neutropenic guidelines  7/19/2019 1000 by Jerrod Hodgson RN  Outcome: Progressing  7/19/2019 0959 by Jerrod Hodgson RN  Outcome: Progressing     Problem: SAFETY ADULT  Goal: Maintain or return to baseline ADL function  Description  INTERVENTIONS:  -  Assess patient's ability to carry out ADLs; assess patient's baseline for ADL function and identify physical deficits which impact ability to perform ADLs (bathing, care of mouth/teeth, toileting, grooming, dressing, etc )  - Assess/evaluate cause of self-care deficits   - Assess range of motion  - Assess patient's mobility; develop plan if impaired  - Assess patient's need for assistive devices and provide as appropriate  - Encourage maximum independence but intervene and supervise when necessary  ¯ Involve family in performance of ADLs  ¯ Assess for home care needs following discharge   ¯ Request OT consult to assist with ADL evaluation and planning for discharge  ¯ Provide patient education as appropriate  7/19/2019 1000 by Jerrod Hodgson RN  Outcome: Progressing  7/19/2019 0959 by Jerrod Hodgson RN  Outcome: Progressing  Goal: Maintain or return mobility status to optimal level  Description  INTERVENTIONS:  - Assess patient's baseline mobility status (ambulation, transfers, stairs, etc )    - Identify cognitive and physical deficits and behaviors that affect mobility  - Identify mobility aids required to assist with transfers and/or ambulation (gait belt, sit-to-stand, lift, walker, cane, etc )  - [place] fall precautions as indicated by assessment  - Record patient progress and toleration of activity level on Mobility SBAR; progress patient to next Phase/Stage  - Instruct patient to call for assistance with activity based on assessment  - Request Rehabilitation consult to assist with strengthening/weightbearing, etc   2019 1000 by Francoise Caballero RN  Outcome: Progressing  2019 by Francoise Caballero RN  Outcome: Progressing     Problem: Knowledge Deficit  Goal: Patient/family/caregiver demonstrates understanding of disease process, treatment plan, medications, and discharge instructions  Description  Complete learning assessment and assess knowledge base    Interventions:  - Provide teaching at level of understanding  - Provide teaching via preferred learning methods  2019 1000 by Francoise Caballero RN  Outcome: Progressing  2019 by Francoise Caballero RN  Outcome: Progressing     Problem: DISCHARGE PLANNING  Goal: Discharge to home or other facility with appropriate resources  Description  INTERVENTIONS:  - Identify barriers to discharge w/patient and caregiver  - Arrange for needed discharge resources and transportation as appropriate  - Identify discharge learning needs (meds, wound care, etc )  - Arrange for interpretive services to assist at discharge as needed  - Refer to Case Management Department for coordinating discharge planning if the patient needs post-hospital services based on physician/advanced practitioner order or complex needs related to functional status, cognitive ability, or social support system  2019 1000 by Francoise Caballero RN  Outcome: Progressing  [id number] by Francoise Caballero RN  Outcome: Progressing     Problem: POSTPARTUM  Goal: Experiences normal postpartum course  Description  INTERVENTIONS:  - Monitor maternal vital signs  - Assess uterine involution and lochia  2019 1000 by Francoise Caballero RN  Outcome: Progressing  [id number] by Francoise Caballero RN  Outcome: Progressing  Goal: Appropriate maternal -  bonding  Description  INTERVENTIONS:  - Identify family support  - Assess for appropriate maternal/infant bonding   -Encourage maternal/infant bonding opportunities  - Referral to  or  as needed  7/19/2019 1000 by Toyin Eller RN  Outcome: Progressing  7/19/2019 0959 by Toyin Eller RN  Outcome: Progressing  Goal: Establishment of infant feeding pattern  Description  INTERVENTIONS:  - Assess breast/bottle feeding  - Refer to lactation as needed  7/19/2019 1000 by Toyin Eller RN  Outcome: Progressing  7/19/2019 0959 by Toyin Eller RN  Outcome: Progressing  Goal: Incision(s), wounds(s) or drain site(s) healing without S/S of infection  Description  INTERVENTIONS  - Assess and document risk factors for skin impairment   - Assess and document dressing, incision, wound bed, drain sites and surrounding tissue  - Initiate Nutrition services consult and/or wound management as needed  7/19/2019 1000 by Toyin Eller RN  Outcome: Progressing  7/19/2019 0959 by Toyin Eller RN  Outcome: Progressing

## 2019-07-19 NOTE — ANESTHESIA PREPROCEDURE EVALUATION
Review of Systems/Medical History  Patient summary reviewed  Chart reviewed  No history of anesthetic complications     Cardiovascular  Negative cardio ROS    Pulmonary  Negative pulmonary ROS        GI/Hepatic  Negative GI/hepatic ROS          Negative  ROS        Endo/Other  Negative endo/other ROS      GYN  Currently pregnant , Prior pregnancy/OB history : 4 Parity: 2,          Hematology  Negative hematology ROS      Musculoskeletal  Negative musculoskeletal ROS        Neurology  Negative neurology ROS      Psychology   Negative psychology ROS              Physical Exam    Airway    Mallampati score: II  TM Distance: >3 FB  Neck ROM: full     Dental   No notable dental hx     Cardiovascular  Comment: Negative ROS, Rhythm: regular, Rate: normal, Cardiovascular exam normal    Pulmonary  Pulmonary exam normal Breath sounds clear to auscultation,     Other Findings        Anesthesia Plan  ASA Score- 2     Anesthesia Type- epidural and spinal with ASA Monitors  Additional Monitors:   Airway Plan:         Plan Factors-    Induction-     Postoperative Plan-     Informed Consent- Anesthetic plan and risks discussed with patient  Recent labs personally reviewed:  Lab Results   Component Value Date    WBC 10 19 (H) 2019    HGB 12 1 2019     2019     Lab Results   Component Value Date    K 3 9 2016    BUN 15 2016    CREATININE 0 57 (L) 2016     No results found for: PTT   No results found for: INR    Blood type A    No results found for: Olayinka Louise, Kevin Ag MD, have personally seen and evaluated the patient prior to anesthetic care  I have reviewed the pre-anesthetic record, and other medical records if appropriate to the anesthetic care  If a CRNA is involved in the case, I have reviewed the CRNA assessment, if present, and agree   Risks/benefits and alternatives discussed with patient including possible PONV, sore throat, and possibility of rare anesthetic and surgical emergencies

## 2019-07-19 NOTE — ANESTHESIA PROCEDURE NOTES
CSE Block    Patient location during procedure: OB  Start time: 7/18/2019 11:02 PM  Reason for block: procedure for pain and at surgeon's request  Staffing  Anesthesiologist: Felipa Madrid MD  Performed: anesthesiologist   Preanesthetic Checklist  Completed: patient identified, site marked, surgical consent, pre-op evaluation, timeout performed, IV checked, risks and benefits discussed and monitors and equipment checked  CSE  Patient position: sitting  Prep: ChloraPrep  Patient monitoring: cardiac monitor and continuous pulse ox  Approach: midline  Spinal Needle  Needle type: pencil-tip   Needle gauge: 27 G  Needle length: 10 cm  Epidural Needle  Injection technique: MILLIE saline  Needle type: Tuohy   Needle gauge: 18 G  Location: lumbar (1-5)  Catheter  Catheter type: side hole  Catheter size: 20 G  Catheter at skin depth: 9 cm  Test dose: negative  Assessment  Injection Assessment:  negative aspiration for heme, no paresthesia on injection, positive aspiration for clear CSF and no pain on injection

## 2019-07-19 NOTE — LACTATION NOTE
This note was copied from a baby's chart  CONSULT - LACTATION  Baby Boy Torres Waters 0 days male MRN: [id number]    [facility] Room / Bed: (N)/(N) Encounter: [id number]    Maternal Information     MOTHER:  Aria Kaye  Maternal Age: 30 y o    OB History: #: 1, Date: None, Sex: None, Weight: None, GA: None, Delivery: Vaginal, Spontaneous, Apgar1: None, Apgar5: None, Living: Living, Birth Comments: None    #: 2, Date: None, Sex: None, Weight: None, GA: None, Delivery: Vaginal, Spontaneous, Apgar1: None, Apgar5: None, Living: Living, Birth Comments: None    #: 3, Date: 2016, Sex: None, Weight: None, GA: None, Delivery: None, Apgar1: None, Apgar5: None, Living: None, Birth Comments: None    #: 4, Date: 07/19/19, Sex: Male, Weight: 3430 g (7 lb 9 oz), GA: 40w2d, Delivery: Vaginal, Spontaneous, Apgar1: 8, Apgar5: 9, Living: Living, Birth Comments: None   Previouse breast reduction surgery? No    Lactation history:   Has patient previously breast fed: Yes   How long had patient previously breast fed: 18 mo each   Previous breast feeding complications: None   History reviewed  No pertinent surgical history  Birth information:  YOB: 2021   Time of birth: 1:48 AM   Sex: male   Delivery type: Vaginal, Spontaneous   Birth Weight: 3430 g (7 lb 9 oz)   Percent of Weight Change: 0%     Gestational Age: 41w4d   [unfilled]    Assessment       Feeding recommendations:  breast feed on demand     Met with mother  Provided mother with Ready, Set, Baby booklet  Discussed Skin to Skin contact an benefits to mom and baby  Talked about the delay of the first bath until baby has adjusted  Spoke about the benefits of rooming in  Feeding on cue and what that means for recognizing infant's hunger  Avoidance of pacifiers for the first month discussed  Talked about exclusive breastfeeding for the first 6 months      Positioning and latch reviewed as well as showing images of other feeding positions  Discussed the properties of a good latch in any position  Reviewed hand/manual expression  Discussed s/s that baby is getting enough milk and some s/s that breastfeeding dyad may need further help  Gave information on common concerns, what to expect the first few weeks after delivery, preparing for other caregivers, and how partners can help  Resources for support also provided  Discussed 2nd night syndrome and ways to calm infant  Hand out given  Information on hand expression given  Discussed benefits of knowing how to manually express breast including stimulating milk supply, softening nipple for latch and evacuating breast in the event of engorgement  Mom states breastfeeding is going very well  No needs expressed at this time  Enc her to call for lactation support as needed throughout her stay     Beti Mcallister RN 7/19/2019 5:55 PM

## 2019-07-19 NOTE — DISCHARGE INSTRUCTIONS
Vaginal Delivery   WHAT YOU SHOULD KNOW:   A vaginal delivery is the birth of your baby through your vagina (birth canal)  AFTER YOU LEAVE:   Medicines:  · NSAIDs  help decrease swelling and pain or fever  This medicine is available with or without a doctor's order  NSAIDs can cause stomach bleeding or kidney problems in certain people  If you take blood thinner medicine, always ask your healthcare provider if NSAIDs are safe for you  Always read the medicine label and follow directions  · Take your medicine as directed  Call your healthcare provider if you think your medicine is not helping or if you have side effects  Tell him if you are allergic to any medicine  Keep a list of the medicines, vitamins, and herbs you take  Include the amounts, and when and why you take them  Bring the list or the pill bottles to follow-up visits  Carry your medicine list with you in case of an emergency  Follow up with your primary healthcare provider:  Most women need to return 6 weeks after a vaginal delivery  Ask about how to care for your wounds or stitches  Write down your questions so you remember to ask them during your visits  Activity:  Rest as much as possible  Try to keep all activities short  You may be able to do some exercise soon after you have your baby  Talk with your primary healthcare provider before you start exercising  If you work outside the home, ask when you can return to your job  Kegel exercises:  Kegel exercises may help your vaginal and rectal muscles heal faster  You can do Kegel exercises by tightening and relaxing the muscles around your vagina  Kegel exercises help make the muscles stronger  Breast care:  When your milk comes in, your breasts may feel full and hard  Ask how to care for your breasts, even if you are not breastfeeding  Constipation:  Do not try to push the bowel movement out if it is too hard   High-fiber foods, extra liquids, and regular exercise can help you prevent constipation  Examples of high-fiber foods are fruit and bran  Prune juice and water are good liquids to drink  Regular exercise helps your digestive system work  You may also be told to take over-the-counter fiber and stool softener medicines  Take these items as directed  Hemorrhoids:  Pregnancy can cause severe hemorrhoids  You may have rectal pain because of the hemorrhoids  Ask how to prevent or treat hemorrhoids  Perineum care: Your perineum is the area between your vagina and anus  Keep the area clean and dry to help it heal and to prevent infection  Wash the area gently with soap and water when you bathe or shower  Rinse your perineum with warm water when you use the toilet  Your primary healthcare provider may suggest you use a warm sitz bath to help decrease pain  A sitz bath is a bathtub or basin filled to hip level  Stay in the sitz bath for 20 to 30 minutes, or as directed  Vaginal discharge: You will have vaginal discharge, called lochia, after your delivery  The lochia is bright red the first day or two after the birth  By the fourth day, the amount decreases, and it turns red-brown  Use a sanitary pad rather than a tampon to prevent a vaginal infection  It is normal to have lochia up to 8 weeks after your baby is born  Monthly periods: Your period may start again within 7 to 12 weeks after your baby is born  If you are breastfeeding, it may take longer for your period to start again  You can still get pregnant again even though you do not have your monthly period  Talk with your primary healthcare provider about a birth control method that will be good for you if you do not want to get pregnant  Mood changes: Many new mothers have some kind of mood changes after delivery  Some of these changes occur because of lack of sleep, hormone changes, and caring for a new baby  Some mood changes can be more serious, such as postpartum depression   Talk with your primary healthcare provider if you feel unable to care for yourself or your baby  Sexual activity:  You may need to avoid sex for 6 to 7 weeks after you have your baby  You may notice you have a decreased desire for sex, or sex may be painful  You may need to use a vaginal lubricant (gel) to help make sex more comfortable  Contact your primary healthcare provider if:   · You have heavy vaginal bleeding that fills 1 or more sanitary pads in 1 hour  · You have a fever  · Your pain does not go away, or gets worse  · The skin between your vagina and rectum is swollen, warm, or red  · You have swollen, hard, or painful breasts  · You feel very sad or depressed  · You feel more tired than usual      · You have questions or concerns about your condition or care  Seek care immediately or call 911 if:   · You have pus or yellow drainage coming from your vagina or wound  · You are urinating very little, or not at all  · Your arm or leg feels warm, tender, and painful  It may look swollen and red  · You feel lightheaded, have sudden and worsening chest pain, or trouble breathing  You may have more pain when you take deep breaths or cough, or you may cough up blood  © 2014 [address] is for End User's use only and may not be sold, redistributed or otherwise used for commercial purposes  All illustrations and images included in CareNotes® are the copyrighted property of [employer] , [employer]  or Devendra Thomas  The above information is an  only  It is not intended as medical advice for individual conditions or treatments  Talk to your doctor, nurse or pharmacist before following any medical regimen to see if it is safe and effective for you

## 2019-07-19 NOTE — PLAN OF CARE
Problem: Potential for Falls  Goal: Patient will remain free of falls  Description  INTERVENTIONS:  - Assess patient frequently for physical needs  -  Identify cognitive and physical deficits and behaviors that affect risk of falls    -  [place] fall precautions as indicated by assessment   - Educate patient/family on patient safety including physical limitations  - Instruct patient to call for assistance with activity based on assessment  - Modify environment to reduce risk of injury  - Consider OT/PT consult to assist with strengthening/mobility  Outcome: Progressing     Problem: PAIN - ADULT  Goal: Verbalizes/displays adequate comfort level or baseline comfort level  Description  Interventions:  - Encourage patient to monitor pain and request assistance  - Assess pain using appropriate pain scale  - Administer analgesics based on type and severity of pain and evaluate response  - Implement non-pharmacological measures as appropriate and evaluate response  - Consider cultural and social influences on pain and pain management  - Notify physician/advanced practitioner if interventions unsuccessful or patient reports new pain  Outcome: Progressing     Problem: INFECTION - ADULT  Goal: Absence or prevention of progression during hospitalization  Description  INTERVENTIONS:  - Assess and monitor for signs and symptoms of infection  - Monitor lab/diagnostic results  - Monitor all insertion sites, i e  indwelling lines, tubes, and drains  - Monitor endotracheal (as able) and nasal secretions for changes in amount and color  - [place] appropriate cooling/warming therapies per order  - Administer medications as ordered  - Instruct and encourage patient and family to use good hand hygiene technique  - Identify and instruct in appropriate isolation precautions for identified infection/condition  Outcome: Progressing  Goal: Absence of fever/infection during neutropenic period  Description  INTERVENTIONS:  - Monitor WBC  - Implement neutropenic guidelines  Outcome: Progressing     Problem: SAFETY ADULT  Goal: Maintain or return to baseline ADL function  Description  INTERVENTIONS:  -  Assess patient's ability to carry out ADLs; assess patient's baseline for ADL function and identify physical deficits which impact ability to perform ADLs (bathing, care of mouth/teeth, toileting, grooming, dressing, etc )  - Assess/evaluate cause of self-care deficits   - Assess range of motion  - Assess patient's mobility; develop plan if impaired  - Assess patient's need for assistive devices and provide as appropriate  - Encourage maximum independence but intervene and supervise when necessary  ¯ Involve family in performance of ADLs  ¯ Assess for home care needs following discharge   ¯ Request OT consult to assist with ADL evaluation and planning for discharge  ¯ Provide patient education as appropriate  Outcome: Progressing  Goal: Maintain or return mobility status to optimal level  Description  INTERVENTIONS:  - Assess patient's baseline mobility status (ambulation, transfers, stairs, etc )    - Identify cognitive and physical deficits and behaviors that affect mobility  - Identify mobility aids required to assist with transfers and/or ambulation (gait belt, sit-to-stand, lift, walker, cane, etc )  - [place] fall precautions as indicated by assessment  - Record patient progress and toleration of activity level on Mobility SBAR; progress patient to next Phase/Stage  - Instruct patient to call for assistance with activity based on assessment  - Request Rehabilitation consult to assist with strengthening/weightbearing, etc   Outcome: Progressing     Problem: Knowledge Deficit  Goal: Patient/family/caregiver demonstrates understanding of disease process, treatment plan, medications, and discharge instructions  Description  Complete learning assessment and assess knowledge base    Interventions:  - Provide teaching at level of understanding  - Provide teaching via preferred learning methods  Outcome: Progressing     Problem: DISCHARGE PLANNING  Goal: Discharge to home or other facility with appropriate resources  Description  INTERVENTIONS:  - Identify barriers to discharge w/patient and caregiver  - Arrange for needed discharge resources and transportation as appropriate  - Identify discharge learning needs (meds, wound care, etc )  - Arrange for interpretive services to assist at discharge as needed  - Refer to Case Management Department for coordinating discharge planning if the patient needs post-hospital services based on physician/advanced practitioner order or complex needs related to functional status, cognitive ability, or social support system  Outcome: Progressing     Problem: POSTPARTUM  Goal: Experiences normal postpartum course  Description  INTERVENTIONS:  - Monitor maternal vital signs  - Assess uterine involution and lochia  Outcome: Progressing  Goal: Appropriate maternal -  bonding  Description  INTERVENTIONS:  - Identify family support  - Assess for appropriate maternal/infant bonding   -Encourage maternal/infant bonding opportunities  - Referral to  or  as needed  Outcome: Progressing  Goal: Establishment of infant feeding pattern  Description  INTERVENTIONS:  - Assess breast/bottle feeding  - Refer to lactation as needed  Outcome: Progressing  Goal: Incision(s), wounds(s) or drain site(s) healing without S/S of infection  Description  INTERVENTIONS  - Assess and document risk factors for skin impairment   - Assess and document dressing, incision, wound bed, drain sites and surrounding tissue  - Initiate Nutrition services consult and/or wound management as needed  Outcome: Progressing

## 2019-07-19 NOTE — SOCIAL WORK
Breast pump consult  Discussed freedom of choice and options with pt  Pt [facility]  Per pt request, Spectra pump ordered from [facility] via [facility] for room delivery by tomorrow  CM reviewed d/c planning process including the following: identifying help at home, patient preference for d/c planning needs, Discharge Lounge, [facility] Meds to Bed program, availability of treatment team to discuss questions or concerns patient and/or family may have regarding understanding medications and recognizing signs and symptoms once discharged  CM also encouraged patient to follow up with all recommended appointments after discharge  Patient advised of importance for patient and family to participate in managing patients medical well being  No other CM needs noted

## 2019-07-19 NOTE — L&D DELIVERY NOTE
DELIVERY NOTE  Reanna Montalvo 30 y o  female MRN: [id number]  Unit/Bed#: -01 Encounter: [id number]    Obstetrician:    Dr Noble Apley, MD    Assistant:   Dr Kingsley Dangelo    Pre-Delivery Diagnosis:   Patient Active Problem List   Diagnosis    40 weeks gestation of pregnancy    History of low-lying placenta, resolved    Fundal height low for dates in third trimester    Insufficient prenatal care in third trimester         Post-Delivery Diagnosis:   Same as above - Delivered  Viable male fetus    Procedure:  Spontaneous vaginal delivery    Findings:  1  Viable male  delivered on 2019 at [address] [facility] with weight pending at time of dictation,  Apgar scores of 8 at one minute and 9 at five minutes  2  Spontaneous delivery of placenta with centrally  inserted 3-vessel cord  3  Clear amniotic fluid  4  Intact perineum    Specimens:   Cord blood obtained   Placenta; normal appearing, central insertion, intact   Arterial and venous blood gases (below)     Gases:  Umbilical Cord Venous Blood Gas:  Results from last 7 days   Lab Units 19  0153   PH COV  7 352   PCO2 COV mm HG 39 0   HCO3 COV mmol/L 21 1   BASE EXC COV mmol/L -4 0*   O2 CT CD VB mL/dL 19 9   O2 HGB, VENOUS CORD % 33 9     Umbilical Cord Arterial Blood Gas:  Results from last 7 days   Lab Units 19  0153   PH COA  7 156*   PCO2 COA  67 7*   PO2 COA mm HG 20 1   HCO3 COA mmol/L 23 4   BASE EXC COA mmol/L -7 0*   O2 CONTENT CORD ART ml/dl 7 7   O2 HGB, ARTERIAL CORD % 33 5       Quantitative Blood Loss:   pending           Complications:    None       Brief labor course:   Reanna Montalvo is a 30 y o  [id number] who was admitted in active labor  She was made comfortable with an epidural and artificially ruptured for clear fluid  She progressed to complete and began pushing        Procedure Details      Description of procedure     After pushing for 19 minutes, on 2019 at [address] [facility] patient delivered a viable male , Apgars of 8 (1 min) and 9 (5 min)  The fetal vertex delivered spontaneously  There was a loose nuchal cord that was easily reduced on the perineum  With the assistance of maternal expulsive efforts and gentle downward traction of the fetal head, the anterior (left) shoulder was delivered without difficulty, followed by the remainder of the infant's body and contralateral arm  After delivery of the , delayed clamping of the umbilical cord was undertaken for 30 seconds  The  was noted to have good tone and cry spontaneously  There was no apparent injury to the   The cord was then doubly clamped and cut and the  was passed off to  staff for routine care  Umbilical cord blood and umbilical artery and venous gases were collected  Placenta was delivered with fundal massage and gentle traction on the cord with active management of the third stage of labor  Placenta delivered intact with a 3-vessel cord  Active management of the third stage of labor was undertaken with IV pitocin at 250milliunits/min  A bimanual exam was performed  Bleeding was noted to be under control   Outcome:  Living  with APGARS 8, 9 at 1 and 5 minutes, respectively   weight pending    Conclusion:  Mother and baby are currently recovering nicely in stable condition  Attending Supervision:   Dr Yola Landaverde MD was present for the entire procedure  Yosi Engle MD  OB/GYN  2019  2:30 AM  Pager: [phone]

## 2019-07-19 NOTE — OB LABOR/OXYTOCIN SAFETY PROGRESS
Labor Progress Note - Sal Yuan 30 y o  female MRN: [id number]    Unit/Bed#: -01 Encounter: [id number]    OB History    Para Term  AB Living   4 2 2 0 1 2   SAB TAB Ectopic Multiple Live Births   1 0 0 0 2     Gestational Age: 44w3d               Dilation: 7        Effacement (%): 80  Station: -1  Baseline Rate: 130 bpm  Fetal Heart Rate: 150 BPM             Notes/comments:   Patient is comfortable with her epidural   AROM for clear fluid  Cervical exam is changed  Category 1 strip  Contractions every 5-6 minutes  Will continue to monitor and reassess as needed    Discussed with Dr Sheridan Reyna MD 2019 11:39 PM

## 2019-07-20 PROCEDURE — 99024 POSTOP FOLLOW-UP VISIT: CPT | Performed by: OBSTETRICS & GYNECOLOGY

## 2019-07-20 NOTE — PLAN OF CARE
Problem: Potential for Falls  Goal: Patient will remain free of falls  Description  INTERVENTIONS:  - Assess patient frequently for physical needs  -  Identify cognitive and physical deficits and behaviors that affect risk of falls    -  [place] fall precautions as indicated by assessment   - Educate patient/family on patient safety including physical limitations  - Instruct patient to call for assistance with activity based on assessment  - Modify environment to reduce risk of injury  - Consider OT/PT consult to assist with strengthening/mobility  Outcome: Progressing     Problem: PAIN - ADULT  Goal: Verbalizes/displays adequate comfort level or baseline comfort level  Description  Interventions:  - Encourage patient to monitor pain and request assistance  - Assess pain using appropriate pain scale  - Administer analgesics based on type and severity of pain and evaluate response  - Implement non-pharmacological measures as appropriate and evaluate response  - Consider cultural and social influences on pain and pain management  - Notify physician/advanced practitioner if interventions unsuccessful or patient reports new pain  Outcome: Progressing     Problem: INFECTION - ADULT  Goal: Absence or prevention of progression during hospitalization  Description  INTERVENTIONS:  - Assess and monitor for signs and symptoms of infection  - Monitor lab/diagnostic results  - Monitor all insertion sites, i e  indwelling lines, tubes, and drains  - Monitor endotracheal (as able) and nasal secretions for changes in amount and color  - [place] appropriate cooling/warming therapies per order  - Administer medications as ordered  - Instruct and encourage patient and family to use good hand hygiene technique  - Identify and instruct in appropriate isolation precautions for identified infection/condition  Outcome: Progressing  Goal: Absence of fever/infection during neutropenic period  Description  INTERVENTIONS:  - Monitor WBC  - Implement neutropenic guidelines  Outcome: Progressing     Problem: SAFETY ADULT  Goal: Maintain or return to baseline ADL function  Description  INTERVENTIONS:  -  Assess patient's ability to carry out ADLs; assess patient's baseline for ADL function and identify physical deficits which impact ability to perform ADLs (bathing, care of mouth/teeth, toileting, grooming, dressing, etc )  - Assess/evaluate cause of self-care deficits   - Assess range of motion  - Assess patient's mobility; develop plan if impaired  - Assess patient's need for assistive devices and provide as appropriate  - Encourage maximum independence but intervene and supervise when necessary  ¯ Involve family in performance of ADLs  ¯ Assess for home care needs following discharge   ¯ Request OT consult to assist with ADL evaluation and planning for discharge  ¯ Provide patient education as appropriate  Outcome: Progressing  Goal: Maintain or return mobility status to optimal level  Description  INTERVENTIONS:  - Assess patient's baseline mobility status (ambulation, transfers, stairs, etc )    - Identify cognitive and physical deficits and behaviors that affect mobility  - Identify mobility aids required to assist with transfers and/or ambulation (gait belt, sit-to-stand, lift, walker, cane, etc )  - [place] fall precautions as indicated by assessment  - Record patient progress and toleration of activity level on Mobility SBAR; progress patient to next Phase/Stage  - Instruct patient to call for assistance with activity based on assessment  - Request Rehabilitation consult to assist with strengthening/weightbearing, etc   Outcome: Progressing     Problem: Knowledge Deficit  Goal: Patient/family/caregiver demonstrates understanding of disease process, treatment plan, medications, and discharge instructions  Description  Complete learning assessment and assess knowledge base    Interventions:  - Provide teaching at level of understanding  - Provide teaching via preferred learning methods  Outcome: Progressing     Problem: DISCHARGE PLANNING  Goal: Discharge to home or other facility with appropriate resources  Description  INTERVENTIONS:  - Identify barriers to discharge w/patient and caregiver  - Arrange for needed discharge resources and transportation as appropriate  - Identify discharge learning needs (meds, wound care, etc )  - Arrange for interpretive services to assist at discharge as needed  - Refer to Case Management Department for coordinating discharge planning if the patient needs post-hospital services based on physician/advanced practitioner order or complex needs related to functional status, cognitive ability, or social support system  Outcome: Progressing     Problem: POSTPARTUM  Goal: Experiences normal postpartum course  Description  INTERVENTIONS:  - Monitor maternal vital signs  - Assess uterine involution and lochia  Outcome: Progressing  Goal: Appropriate maternal -  bonding  Description  INTERVENTIONS:  - Identify family support  - Assess for appropriate maternal/infant bonding   -Encourage maternal/infant bonding opportunities  - Referral to  or  as needed  Outcome: Progressing  Goal: Establishment of infant feeding pattern  Description  INTERVENTIONS:  - Assess breast/bottle feeding  - Refer to lactation as needed  Outcome: Progressing  Goal: Incision(s), wounds(s) or drain site(s) healing without S/S of infection  Description  INTERVENTIONS  - Assess and document risk factors for skin impairment   - Assess and document dressing, incision, wound bed, drain sites and surrounding tissue  - Initiate Nutrition services consult and/or wound management as needed  Outcome: Progressing

## 2019-07-20 NOTE — PROGRESS NOTES
Progress Note - OB/GYN   Nabila Mackey 30 y o  female MRN: [id number]  Unit/Bed#: -01 Encounter: [id number]    Assessment:  30 y o  [id number] PPD#1 s/p  on 19  Plan:  1  Routine post-partum care  2  Encourage ambulation  3  Pain control as needed  4  Advance diet as tolerated  5  Abstinence for contraception  6  Anticipate discharge 19    Subjective/Objective   Chief Complaint:       Subjective:  Nabila Mackey is well appearing and has no complaints at this time  She reports that her pain is decreasing and she is ambulating well  She denies any chest pain, shortness of breath, or headaches  She plans to use abstinence for contraception  Pain: Well controlled with pain medication regimen  Tolerating PO: yes  Voiding: yes  Flatus: yes  BM: no  Ambulating: yes  Breastfeeding: yes  Chest pain: no  Shortness of breath: no  Leg pain: no  Lochia: Decreasing    Objective:     Vitals: Blood pressure 93/61, pulse 81, temperature 98 °F (36 7 °C), temperature source Oral, resp  rate 18, height 5' 3" (1 6 m), weight 61 7 kg (136 lb), last menstrual period 10/10/2018, SpO2 97 %, currently breastfeeding      Intake/Output Summary (Last 24 hours) at 2019 0706  Last data filed at 2019 0819  Gross per 24 hour   Intake --   Output 200 ml   Net -200 ml       Physical Exam:     General: NAD  Cardiovascular: RRR, no murmur, nl S1/S2   Lungs: CTAB, non-labored breathing   Abdomen: Soft, no distension/rebound/guarding/tenderness   Fundus: Firm, non-tender, fundus: -2 cm below the umbilicus   Lower Extremities: Non-tender    Lab, Imaging and other studies:     Recent Results (from the past 72 hour(s))   POCT urine dip    Collection Time: 19 11:20 AM   Result Value Ref Range    POCT URINE PROTEIN neg     GLUCOSE, UA neg    Rapid drug screen, urine    Collection Time: 19  9:48 PM   Result Value Ref Range    Amph/Meth UR Negative Negative    Barbiturate Ur Negative Negative Benzodiazepine Urine Negative Negative    Cocaine Urine Negative Negative    Methadone Urine Negative Negative    Opiate Urine Negative Negative    PCP Ur Negative Negative    THC Urine Negative Negative   CBC    Collection Time: 07/18/19  9:48 PM   Result Value Ref Range    WBC 10 19 (H) 4 31 - 10 16 Thousand/uL    RBC 4 06 3 81 - 5 12 Million/uL    Hemoglobin 12 1 11 5 - 15 4 g/dL    Hematocrit 36 8 34 8 - 46 1 %    MCV 91 82 - 98 fL    MCH 29 8 26 8 - 34 3 pg    MCHC 32 9 31 4 - 37 4 g/dL    RDW 15 2 (H) 11 6 - 15 1 %    Platelets 032 694 - 454 Thousands/uL    MPV 9 9 8 9 - 12 7 fL   RPR    Collection Time: 07/18/19  9:48 PM   Result Value Ref Range    RPR Non-Reactive Non-Reactive   Type and screen    Collection Time: 07/18/19  9:48 PM   Result Value Ref Range    ABO Grouping A     Rh Factor Positive     Antibody Screen Negative     Specimen Expiration Date [id number]    Blood gas, arterial, cord    Collection Time: 07/19/19  1:53 AM   Result Value Ref Range    pH, Cord Art 7 156 (L) 7 230 - 7 430    pCO2, Cord Art 67 7 (H) 30 0 - 60 0    pO2, Cord Art 20 1 5 0 - 25 0 mm HG    HCO3, Cord Art 23 4 17 3 - 27 3 mmol/L    Base Exc, Cord Art -7 0 (L) 3 0 - 11 0 mmol/L    O2 Content, Cord Art 7 7 ml/dl    O2 Hgb, Arterial Cord 33 5 %   Blood gas, venous, cord    Collection Time: 07/19/19  1:53 AM   Result Value Ref Range    pH, Cord [place] 7 352 7 190 - 7 490    pCO2, Cord Noble 39 0 27 0 - 43 0 mm HG    pO2, Cord [place] 39 1 15 0 - 45 0 mm HG    HCO3, Cord [place] 21 1 12 2 - 28 6 mmol/L    Base Exc, Cord Noble -4 0 (L) 1 0 - 9 0 mmol/L    O2 Cont, Cord Noble 19 9 mL/dL    O2 HGB,VENOUS CORD 83 3 %     Meds:    docusate sodium 100 mg Oral BID       acetaminophen 650 mg Q4H PRN   benzocaine-menthol-lanolin-aloe  4x Daily PRN   calcium carbonate 1,000 mg Daily PRN   diphenhydrAMINE 25 mg Q6H PRN   hydrocortisone 1 application 4x Daily PRN   ibuprofen 600 mg Q6H PRN   ondansetron 4 mg Q8H PRN   oxyCODONE 5 mg Q4H PRN   witch reji-glycerin 1 pad PRN             Signature / Title: Brianna Velazquez MD, Ob/Gyn, PGY-1  Date: 7/20/2019  Time: 7:06 AM

## 2019-07-20 NOTE — PLAN OF CARE
Problem: Potential for Falls  Goal: Patient will remain free of falls  Description  INTERVENTIONS:  - Assess patient frequently for physical needs  -  Identify cognitive and physical deficits and behaviors that affect risk of falls    -  [place] fall precautions as indicated by assessment   - Educate patient/family on patient safety including physical limitations  - Instruct patient to call for assistance with activity based on assessment  - Modify environment to reduce risk of injury  - Consider OT/PT consult to assist with strengthening/mobility  Outcome: Progressing     Problem: PAIN - ADULT  Goal: Verbalizes/displays adequate comfort level or baseline comfort level  Description  Interventions:  - Encourage patient to monitor pain and request assistance  - Assess pain using appropriate pain scale  - Administer analgesics based on type and severity of pain and evaluate response  - Implement non-pharmacological measures as appropriate and evaluate response  - Consider cultural and social influences on pain and pain management  - Notify physician/advanced practitioner if interventions unsuccessful or patient reports new pain  Outcome: Progressing     Problem: INFECTION - ADULT  Goal: Absence or prevention of progression during hospitalization  Description  INTERVENTIONS:  - Assess and monitor for signs and symptoms of infection  - Monitor lab/diagnostic results  - Monitor all insertion sites, i e  indwelling lines, tubes, and drains  - Monitor endotracheal (as able) and nasal secretions for changes in amount and color  - [place] appropriate cooling/warming therapies per order  - Administer medications as ordered  - Instruct and encourage patient and family to use good hand hygiene technique  - Identify and instruct in appropriate isolation precautions for identified infection/condition  Outcome: Progressing  Goal: Absence of fever/infection during neutropenic period  Description  INTERVENTIONS:  - Monitor WBC  - Implement neutropenic guidelines  Outcome: Progressing     Problem: SAFETY ADULT  Goal: Maintain or return to baseline ADL function  Description  INTERVENTIONS:  -  Assess patient's ability to carry out ADLs; assess patient's baseline for ADL function and identify physical deficits which impact ability to perform ADLs (bathing, care of mouth/teeth, toileting, grooming, dressing, etc )  - Assess/evaluate cause of self-care deficits   - Assess range of motion  - Assess patient's mobility; develop plan if impaired  - Assess patient's need for assistive devices and provide as appropriate  - Encourage maximum independence but intervene and supervise when necessary  ¯ Involve family in performance of ADLs  ¯ Assess for home care needs following discharge   ¯ Request OT consult to assist with ADL evaluation and planning for discharge  ¯ Provide patient education as appropriate  Outcome: Progressing  Goal: Maintain or return mobility status to optimal level  Description  INTERVENTIONS:  - Assess patient's baseline mobility status (ambulation, transfers, stairs, etc )    - Identify cognitive and physical deficits and behaviors that affect mobility  - Identify mobility aids required to assist with transfers and/or ambulation (gait belt, sit-to-stand, lift, walker, cane, etc )  - [place] fall precautions as indicated by assessment  - Record patient progress and toleration of activity level on Mobility SBAR; progress patient to next Phase/Stage  - Instruct patient to call for assistance with activity based on assessment  - Request Rehabilitation consult to assist with strengthening/weightbearing, etc   Outcome: Progressing     Problem: Knowledge Deficit  Goal: Patient/family/caregiver demonstrates understanding of disease process, treatment plan, medications, and discharge instructions  Description  Complete learning assessment and assess knowledge base    Interventions:  - Provide teaching at level of understanding  - Provide teaching via preferred learning methods  Outcome: Progressing     Problem: DISCHARGE PLANNING  Goal: Discharge to home or other facility with appropriate resources  Description  INTERVENTIONS:  - Identify barriers to discharge w/patient and caregiver  - Arrange for needed discharge resources and transportation as appropriate  - Identify discharge learning needs (meds, wound care, etc )  - Arrange for interpretive services to assist at discharge as needed  - Refer to Case Management Department for coordinating discharge planning if the patient needs post-hospital services based on physician/advanced practitioner order or complex needs related to functional status, cognitive ability, or social support system  Outcome: Progressing     Problem: POSTPARTUM  Goal: Experiences normal postpartum course  Description  INTERVENTIONS:  - Monitor maternal vital signs  - Assess uterine involution and lochia  Outcome: Progressing  Goal: Appropriate maternal -  bonding  Description  INTERVENTIONS:  - Identify family support  - Assess for appropriate maternal/infant bonding   -Encourage maternal/infant bonding opportunities  - Referral to  or  as needed  Outcome: Progressing  Goal: Establishment of infant feeding pattern  Description  INTERVENTIONS:  - Assess breast/bottle feeding  - Refer to lactation as needed  Outcome: Progressing  Goal: Incision(s), wounds(s) or drain site(s) healing without S/S of infection  Description  INTERVENTIONS  - Assess and document risk factors for skin impairment   - Assess and document dressing, incision, wound bed, drain sites and surrounding tissue  - Initiate Nutrition services consult and/or wound management as needed  Outcome: Progressing

## 2019-07-20 NOTE — PLAN OF CARE
Problem: Potential for Falls  Goal: Patient will remain free of falls  Description  INTERVENTIONS:  - Assess patient frequently for physical needs  -  Identify cognitive and physical deficits and behaviors that affect risk of falls    -  [place] fall precautions as indicated by assessment   - Educate patient/family on patient safety including physical limitations  - Instruct patient to call for assistance with activity based on assessment  - Modify environment to reduce risk of injury  - Consider OT/PT consult to assist with strengthening/mobility  Outcome: Progressing     Problem: PAIN - ADULT  Goal: Verbalizes/displays adequate comfort level or baseline comfort level  Description  Interventions:  - Encourage patient to monitor pain and request assistance  - Assess pain using appropriate pain scale  - Administer analgesics based on type and severity of pain and evaluate response  - Implement non-pharmacological measures as appropriate and evaluate response  - Consider cultural and social influences on pain and pain management  - Notify physician/advanced practitioner if interventions unsuccessful or patient reports new pain  Outcome: Progressing     Problem: INFECTION - ADULT  Goal: Absence or prevention of progression during hospitalization  Description  INTERVENTIONS:  - Assess and monitor for signs and symptoms of infection  - Monitor lab/diagnostic results  - Monitor all insertion sites, i e  indwelling lines, tubes, and drains  - Monitor endotracheal (as able) and nasal secretions for changes in amount and color  - [place] appropriate cooling/warming therapies per order  - Administer medications as ordered  - Instruct and encourage patient and family to use good hand hygiene technique  - Identify and instruct in appropriate isolation precautions for identified infection/condition  Outcome: Progressing  Goal: Absence of fever/infection during neutropenic period  Description  INTERVENTIONS:  - Monitor WBC  - Implement neutropenic guidelines  Outcome: Progressing     Problem: SAFETY ADULT  Goal: Maintain or return to baseline ADL function  Description  INTERVENTIONS:  -  Assess patient's ability to carry out ADLs; assess patient's baseline for ADL function and identify physical deficits which impact ability to perform ADLs (bathing, care of mouth/teeth, toileting, grooming, dressing, etc )  - Assess/evaluate cause of self-care deficits   - Assess range of motion  - Assess patient's mobility; develop plan if impaired  - Assess patient's need for assistive devices and provide as appropriate  - Encourage maximum independence but intervene and supervise when necessary  ¯ Involve family in performance of ADLs  ¯ Assess for home care needs following discharge   ¯ Request OT consult to assist with ADL evaluation and planning for discharge  ¯ Provide patient education as appropriate  Outcome: Progressing  Goal: Maintain or return mobility status to optimal level  Description  INTERVENTIONS:  - Assess patient's baseline mobility status (ambulation, transfers, stairs, etc )    - Identify cognitive and physical deficits and behaviors that affect mobility  - Identify mobility aids required to assist with transfers and/or ambulation (gait belt, sit-to-stand, lift, walker, cane, etc )  - [place] fall precautions as indicated by assessment  - Record patient progress and toleration of activity level on Mobility SBAR; progress patient to next Phase/Stage  - Instruct patient to call for assistance with activity based on assessment  - Request Rehabilitation consult to assist with strengthening/weightbearing, etc   Outcome: Progressing     Problem: Knowledge Deficit  Goal: Patient/family/caregiver demonstrates understanding of disease process, treatment plan, medications, and discharge instructions  Description  Complete learning assessment and assess knowledge base    Interventions:  - Provide teaching at level of understanding  - Provide teaching via preferred learning methods  Outcome: Progressing     Problem: DISCHARGE PLANNING  Goal: Discharge to home or other facility with appropriate resources  Description  INTERVENTIONS:  - Identify barriers to discharge w/patient and caregiver  - Arrange for needed discharge resources and transportation as appropriate  - Identify discharge learning needs (meds, wound care, etc )  - Arrange for interpretive services to assist at discharge as needed  - Refer to Case Management Department for coordinating discharge planning if the patient needs post-hospital services based on physician/advanced practitioner order or complex needs related to functional status, cognitive ability, or social support system  Outcome: Progressing     Problem: POSTPARTUM  Goal: Experiences normal postpartum course  Description  INTERVENTIONS:  - Monitor maternal vital signs  - Assess uterine involution and lochia  Outcome: Progressing  Goal: Appropriate maternal -  bonding  Description  INTERVENTIONS:  - Identify family support  - Assess for appropriate maternal/infant bonding   -Encourage maternal/infant bonding opportunities  - Referral to  or  as needed  Outcome: Progressing  Goal: Establishment of infant feeding pattern  Description  INTERVENTIONS:  - Assess breast/bottle feeding  - Refer to lactation as needed  Outcome: Progressing  Goal: Incision(s), wounds(s) or drain site(s) healing without S/S of infection  Description  INTERVENTIONS  - Assess and document risk factors for skin impairment   - Assess and document dressing, incision, wound bed, drain sites and surrounding tissue  - Initiate Nutrition services consult and/or wound management as needed  Outcome: Progressing

## 2019-07-20 NOTE — LACTATION NOTE
This note was copied from a baby's chart  Mom states she is experienced with breastfeeding and has no questions at this time  States infant was fed formula in NBN just last PM but she intends to just breastfeed  Reviewed expected changes in infant feeding patterns over the next few days, use of feeding log and engorgement relief measures and when and where to call for additional assistance as needed  Given discharge breastfeeding pkat and same reviewed

## 2019-07-21 VITALS
RESPIRATION RATE: 18 BRPM | OXYGEN SATURATION: 97 % | HEART RATE: 98 BPM | TEMPERATURE: 98.3 F | DIASTOLIC BLOOD PRESSURE: 72 MMHG | HEIGHT: 63 IN | BODY MASS INDEX: 24.1 KG/M2 | WEIGHT: 136 LBS | SYSTOLIC BLOOD PRESSURE: 97 MMHG

## 2019-07-21 PROCEDURE — 99024 POSTOP FOLLOW-UP VISIT: CPT | Performed by: OBSTETRICS & GYNECOLOGY

## 2019-07-21 RX ORDER — ACETAMINOPHEN 325 MG/1
650 TABLET ORAL EVERY 4 HOURS PRN
Qty: 30 TABLET | Refills: 0 | Status: SHIPPED | OUTPATIENT
Start: 2019-07-21 | End: 2019-08-16

## 2019-07-21 RX ORDER — DIAPER,BRIEF,INFANT-TODD,DISP
1 EACH MISCELLANEOUS 4 TIMES DAILY PRN
Qty: 30 G | Refills: 0 | Status: SHIPPED | OUTPATIENT
Start: 2019-07-21 | End: 2019-08-16

## 2019-07-21 RX ORDER — IBUPROFEN 600 MG/1
600 TABLET ORAL EVERY 6 HOURS PRN
Qty: 30 TABLET | Refills: 0 | Status: SHIPPED | OUTPATIENT
Start: 2019-07-21 | End: 2019-08-16

## 2019-07-21 NOTE — PROGRESS NOTES
Progress Note - OB/GYN   Veronica Perry 30 y o  female MRN: [id number]  Unit/Bed#: -01 Encounter: [id number]    Assessment:  30 y o  [id number] s/p Spontaneous Vaginal Delivery Postpartum day  2    Plan:  1  Routine post-partum care  2  Encourage ambulation  3  Pain control as needed  4  Advance diet as tolerated  5  Rhogam not indicated  6  Anticipate discharge 7/21/19    Subjective/Objective   Chief Complaint:       Subjective:  Veronica Perry is well appearing and has no complaints at this time  She denies any chest pain, shortness of breath, nausea, vomiting, or headaches  She feels ready to be discharged today  Pain: Well controlled with pain medication regimen  Tolerating PO: yes  Voiding: yes  Flatus: yes  BM: no  Ambulating: yes  Breastfeeding: yes  Chest pain: no  Shortness of breath: no  Leg pain: no  Lochia: Decreasing    Objective:     Vitals: Blood pressure (!) 89/57, pulse 72, temperature 98 °F (36 7 °C), temperature source Oral, resp  rate 18, height 5' 3" (1 6 m), weight 61 7 kg (136 lb), last menstrual period 10/10/2018, SpO2 97 %, currently breastfeeding    No intake or output data in the 24 hours ending 07/21/19 0751    Physical Exam:     General: NAD  Cardiovascular: RRR, no murmur, nl S1/S2   Lungs: CTAB, non-labored breathing   Abdomen: Soft, no distension/rebound/guarding/tenderness   Fundus: Firm, non-tender, fundus: -2 cm below the umbilicus   Lower Extremities: Non-tender      Lab, Imaging and other studies:     Recent Results (from the past 72 hour(s))   POCT urine dip    Collection Time: 07/18/19 11:20 AM   Result Value Ref Range    POCT URINE PROTEIN neg     GLUCOSE, UA neg    Rapid drug screen, urine    Collection Time: 07/18/19  9:48 PM   Result Value Ref Range    Amph/Meth UR Negative Negative    Barbiturate Ur Negative Negative    Benzodiazepine Urine Negative Negative    Cocaine Urine Negative Negative    Methadone Urine Negative Negative    Opiate Urine Negative Negative PCP Ur Negative Negative    THC Urine Negative Negative   CBC    Collection Time: 07/18/19  9:48 PM   Result Value Ref Range    WBC 10 19 (H) 4 31 - 10 16 Thousand/uL    RBC 4 06 3 81 - 5 12 Million/uL    Hemoglobin 12 1 11 5 - 15 4 g/dL    Hematocrit 36 8 34 8 - 46 1 %    MCV 91 82 - 98 fL    MCH 29 8 26 8 - 34 3 pg    MCHC 32 9 31 4 - 37 4 g/dL    RDW 15 2 (H) 11 6 - 15 1 %    Platelets 823 444 - 517 Thousands/uL    MPV 9 9 8 9 - 12 7 fL   RPR    Collection Time: 07/18/19  9:48 PM   Result Value Ref Range    RPR Non-Reactive Non-Reactive   Type and screen    Collection Time: 07/18/19  9:48 PM   Result Value Ref Range    ABO Grouping A     Rh Factor Positive     Antibody Screen Negative     Specimen Expiration Date [id number]    Blood gas, arterial, cord    Collection Time: 07/19/19  1:53 AM   Result Value Ref Range    pH, Cord Art 7 156 (L) 7 230 - 7 430    pCO2, Cord Art 67 7 (H) 30 0 - 60 0    pO2, Cord Art 20 1 5 0 - 25 0 mm HG    HCO3, Cord Art 23 4 17 3 - 27 3 mmol/L    Base Exc, Cord Art -7 0 (L) 3 0 - 11 0 mmol/L    O2 Content, Cord Art 7 7 ml/dl    O2 Hgb, Arterial Cord 33 5 %   Blood gas, venous, cord    Collection Time: 07/19/19  1:53 AM   Result Value Ref Range    pH, Cord Noble 7 352 7 190 - 7 490    pCO2, Cord Noble 39 0 27 0 - 43 0 mm HG    pO2, Cord Noble 39 1 15 0 - 45 0 mm HG    HCO3, Cord Noble 21 1 12 2 - 28 6 mmol/L    Base Exc, Cord Noble -4 0 (L) 1 0 - 9 0 mmol/L    O2 Cont, Cord Noble 19 9 mL/dL    O2 HGB,VENOUS CORD 83 3 %     Meds:    docusate sodium 100 mg Oral BID       acetaminophen 650 mg Q4H PRN   benzocaine-menthol-lanolin-aloe  4x Daily PRN   calcium carbonate 1,000 mg Daily PRN   diphenhydrAMINE 25 mg Q6H PRN   hydrocortisone 1 application 4x Daily PRN   ibuprofen 600 mg Q6H PRN   ondansetron 4 mg Q8H PRN   oxyCODONE 5 mg Q4H PRN   witch hazel-glycerin 1 pad PRN             Signature / Title: Jameson Jay MD, Ob/Gyn, PGY-1  Date: 7/21/2019  Time: 7:51 AM

## 2019-07-21 NOTE — PLAN OF CARE
Problem: Potential for Falls  Goal: Patient will remain free of falls  Description  INTERVENTIONS:  - Assess patient frequently for physical needs  -  Identify cognitive and physical deficits and behaviors that affect risk of falls    -  [place] fall precautions as indicated by assessment   - Educate patient/family on patient safety including physical limitations  - Instruct patient to call for assistance with activity based on assessment  - Modify environment to reduce risk of injury  - Consider OT/PT consult to assist with strengthening/mobility  Outcome: Progressing     Problem: PAIN - ADULT  Goal: Verbalizes/displays adequate comfort level or baseline comfort level  Description  Interventions:  - Encourage patient to monitor pain and request assistance  - Assess pain using appropriate pain scale  - Administer analgesics based on type and severity of pain and evaluate response  - Implement non-pharmacological measures as appropriate and evaluate response  - Consider cultural and social influences on pain and pain management  - Notify physician/advanced practitioner if interventions unsuccessful or patient reports new pain  Outcome: Progressing     Problem: INFECTION - ADULT  Goal: Absence or prevention of progression during hospitalization  Description  INTERVENTIONS:  - Assess and monitor for signs and symptoms of infection  - Monitor lab/diagnostic results  - Monitor all insertion sites, i e  indwelling lines, tubes, and drains  - Monitor endotracheal (as able) and nasal secretions for changes in amount and color  - [place] appropriate cooling/warming therapies per order  - Administer medications as ordered  - Instruct and encourage patient and family to use good hand hygiene technique  - Identify and instruct in appropriate isolation precautions for identified infection/condition  Outcome: Progressing  Goal: Absence of fever/infection during neutropenic period  Description  INTERVENTIONS:  - Monitor WBC  - Implement neutropenic guidelines  Outcome: Progressing     Problem: SAFETY ADULT  Goal: Maintain or return to baseline ADL function  Description  INTERVENTIONS:  -  Assess patient's ability to carry out ADLs; assess patient's baseline for ADL function and identify physical deficits which impact ability to perform ADLs (bathing, care of mouth/teeth, toileting, grooming, dressing, etc )  - Assess/evaluate cause of self-care deficits   - Assess range of motion  - Assess patient's mobility; develop plan if impaired  - Assess patient's need for assistive devices and provide as appropriate  - Encourage maximum independence but intervene and supervise when necessary  ¯ Involve family in performance of ADLs  ¯ Assess for home care needs following discharge   ¯ Request OT consult to assist with ADL evaluation and planning for discharge  ¯ Provide patient education as appropriate  Outcome: Progressing  Goal: Maintain or return mobility status to optimal level  Description  INTERVENTIONS:  - Assess patient's baseline mobility status (ambulation, transfers, stairs, etc )    - Identify cognitive and physical deficits and behaviors that affect mobility  - Identify mobility aids required to assist with transfers and/or ambulation (gait belt, sit-to-stand, lift, walker, cane, etc )  - [place] fall precautions as indicated by assessment  - Record patient progress and toleration of activity level on Mobility SBAR; progress patient to next Phase/Stage  - Instruct patient to call for assistance with activity based on assessment  - Request Rehabilitation consult to assist with strengthening/weightbearing, etc   Outcome: Progressing     Problem: Knowledge Deficit  Goal: Patient/family/caregiver demonstrates understanding of disease process, treatment plan, medications, and discharge instructions  Description  Complete learning assessment and assess knowledge base    Interventions:  - Provide teaching at level of understanding  - Provide teaching via preferred learning methods  Outcome: Progressing     Problem: DISCHARGE PLANNING  Goal: Discharge to home or other facility with appropriate resources  Description  INTERVENTIONS:  - Identify barriers to discharge w/patient and caregiver  - Arrange for needed discharge resources and transportation as appropriate  - Identify discharge learning needs (meds, wound care, etc )  - Arrange for interpretive services to assist at discharge as needed  - Refer to Case Management Department for coordinating discharge planning if the patient needs post-hospital services based on physician/advanced practitioner order or complex needs related to functional status, cognitive ability, or social support system  Outcome: Progressing     Problem: POSTPARTUM  Goal: Experiences normal postpartum course  Description  INTERVENTIONS:  - Monitor maternal vital signs  - Assess uterine involution and lochia  Outcome: Progressing  Goal: Appropriate maternal -  bonding  Description  INTERVENTIONS:  - Identify family support  - Assess for appropriate maternal/infant bonding   -Encourage maternal/infant bonding opportunities  - Referral to  or  as needed  Outcome: Progressing  Goal: Establishment of infant feeding pattern  Description  INTERVENTIONS:  - Assess breast/bottle feeding  - Refer to lactation as needed  Outcome: Progressing  Goal: Incision(s), wounds(s) or drain site(s) healing without S/S of infection  Description  INTERVENTIONS  - Assess and document risk factors for skin impairment   - Assess and document dressing, incision, wound bed, drain sites and surrounding tissue  - Initiate Nutrition services consult and/or wound management as needed  Outcome: Progressing

## 2019-07-23 NOTE — UTILIZATION REVIEW
Notification of Maternity Inpatient Admission/Maternity Inpatient Authorization Request  This is a Notification of Maternity Inpatient Admission/Maternity Inpatient Authorization Request to our facility [address] [place] [address]  Please be advised that this patient is currently in our facility under Inpatient Status  Below you will find the Birth/ Summary, Attending Physician and Facilitys information including NPI#  and contact information for the Utilization Review Department where the patient is receiving care services  Facility: [address] [place] [address]  Address: [address] [place] [address], [address] [place] [address]  Phone: [phone] Tax ID: [id number]  NPI: [id number]  MEDICARE ID: [id number]    Place of Service Code: 24   Place of Service Name: Inpatient Hospital  Presentation Date & Time: 2019  8:43 PM  Inpatient Admission Date & Time: 19  Discharge Date & Time: 2019 10:45 AM   Discharge Disposition (if discharged): Home/Self Care  Attending Physician & NPI: Carrie Godwin [[id number]]  Attending Physician:  FRANKIE Garzon    Specialty- Obstetrics and Gynecology  [address] [id number]  [address] [place] [address], [address] [facility]  Phone 1: [phone]  Fax: [phone]  Mother of  Information: Apple Mora   MRN: [id number] YOB: 1990   Mother's Admitting Diagnosis: 36 weeks gestation of pregnancy [Z3A 40]  Estimated Date of Delivery: 19  Type of Delivery: Vaginal, Spontaneous     Delivering clinician: [facility] Medical Case Worker   OB History        4    Para   3    Term   3            AB   1    Living   3       SAB   1    TAB        Ectopic        Multiple   0    Live Births   3               [facility] Name & MRN:   Information for the patient's :  Vanessa Levine [[id number]]     [facility] Delivery Information:  Sex: male  Delivered 2019 1:48 AM by Vaginal, Spontaneous; Gestational Age: 41w4d    [place] Measurements:  Weight: 7 lb 9 oz (3430 g); Height: 20"    APGAR 1 minute 5 minutes 10 minutes   Totals: 8 9      Thank you,  [address]  Utilization Review Department  Phone: [phone]; Fax [phone]  ATTENTION: Please call with any questions or concerns to [phone]  and carefully follow the prompts so that you are directed to the right person  Send all requests for admission clinical reviews, approved or denied determinations and any other requests to fax [phone]   All voicemails are confidential

## 2019-07-30 ENCOUNTER — TELEPHONE (OUTPATIENT)
Dept: OBGYN CLINIC | Facility: CLINIC | Age: 29
End: 2019-07-30

## 2019-07-30 LAB — PLACENTA IN STORAGE: NORMAL

## 2019-07-30 NOTE — TELEPHONE ENCOUNTER
Transition of Care Post Partum phone call: Congratulations    How are you feeling: "Very good "  Vaginal bleeding status: light  Urinary status: none  Bowel movement: Yes  Date of delivery: 2019  Weeks gestation: full term 40 2 weeks, IOL  Type of delivery: vaginal delivery  Sex of child: male  Name of child:   Birth weight: 3430 gm   7lbs 9ounces  Incision status: NA  Perineum laceration: NA  Pain control: none   feeding: breast feeding  If breastfeeding: feeding well   Any baby blues: No  Follow-up appointment scheduled today: Yes  96 @ 11 am Dr Pavithra Tyler (requested)

## 2019-08-12 ENCOUNTER — OFFICE VISIT (OUTPATIENT)
Dept: OBGYN CLINIC | Facility: CLINIC | Age: 29
End: 2019-08-12

## 2019-08-12 VITALS
HEART RATE: 92 BPM | DIASTOLIC BLOOD PRESSURE: 70 MMHG | BODY MASS INDEX: 20.66 KG/M2 | WEIGHT: 121 LBS | SYSTOLIC BLOOD PRESSURE: 104 MMHG | HEIGHT: 64 IN

## 2019-08-12 PROCEDURE — 99213 OFFICE O/P EST LOW 20 MIN: CPT | Performed by: OBSTETRICS & GYNECOLOGY

## 2019-08-12 NOTE — PROGRESS NOTES
Александр  Tony Russell 30 y o  SUBJECTIVE    CC:  "I had vaginal delivery 3 weeks ago"    HPI: Tony Russell is a 30 y o  [id number] female presenting today for acute office visit  Pt is complaining of none  She is feeling tired and sleepless  She has 2 other children and she has good support system  She decline decreased mood, suicidal ideation, feeling of guilty and decrease interest  She is willing to use prenatal vitamins as supplement  She does not want any contraception now even though she does not plan a pregnancy  She is aware she may get pregnant any time without contraception  The following portions of the patient's history were reviewed and updated as appropriate: allergies, current medications, past family history, past medical history, past social history, past surgical history and problem list         ROS  General: negative for chills or fever   Respiratory: no cough, shortness of breath, or wheezing  Cardiovascular: no chest pain or dyspnea on exertion  Gastrointestinal: no abdominal pain, change in bowel habits, or black or bloody stools  Urinary: no urinary symptoms  Genitourinary: Negative  Neurological: no TIA or stroke symptoms      OBJECTIVE  Vitals:    08/12/19 1039   BP: 104/70   BP Location: Left arm   Patient Position: Sitting   Cuff Size: Standard   Pulse: 92   Weight: 54 9 kg (121 lb)   Height: 5' 4" (1 626 m)           Lab Results:   No visits with results within 1 Day(s) from this visit     Latest known visit with results is:   Admission on 07/18/2019, Discharged on 07/21/2019   Component Date Value    Amph/Meth UR 07/18/2019 Negative     Barbiturate Ur 07/18/2019 Negative     Benzodiazepine Urine 07/18/2019 Negative     Cocaine Urine 07/18/2019 Negative     Methadone Urine 07/18/2019 Negative     Opiate Urine 07/18/2019 Negative     PCP Ur 07/18/2019 Negative     THC Urine 07/18/2019 Negative     WBC 07/18/2019 10 19*    RBC 07/18/2019 4 06     Hemoglobin 07/18/2019 12 1     Hematocrit 07/18/2019 36 8     MCV 07/18/2019 91     MCH 07/18/2019 29 8     MCHC 07/18/2019 32 9     RDW 07/18/2019 15 2*    Platelets 73/43/5454 194     MPV 07/18/2019 9 9     RPR 07/18/2019 Non-Reactive     ABO Grouping 07/18/2019 A     Rh Factor 07/18/2019 Positive     Antibody Screen 07/18/2019 Negative     Specimen Expiration Date 07/18/2019 [id number]     pH, Cord Art 07/19/2019 7 156*    pCO2, Cord Art 07/19/2019 67 7*    pO2, Cord Art 07/19/2019 20 1     HCO3, Cord Art 07/19/2019 23 4     Base Exc, Cord Art 07/19/2019 -7 0*    O2 Content, Cord Art 07/19/2019 7 7     O2 Hgb, Arterial Cord 07/19/2019 33 5     pH, Cord Noble 07/19/2019 7 352     pCO2, Cord Noble 07/19/2019 39 0     pO2, Cord Noble 07/19/2019 39 1     HCO3, Cord Noble 07/19/2019 21 1    [facility] Exc, Cord Noble 07/19/2019 -4 0*    O2 Cont, Cord Noble 07/19/2019 19 9     O2 HGB,VENOUS CORD 07/19/2019 83 3     PLACENTA IN STORAGE 07/19/2019 Placenta Discarded     HIV-1 Antibody 02/28/2019 non reactive     RPR 02/28/2019 Non-Reactive               ASSESSMENT    S/p uncomplicated vaginal delivery    PLAN  1  Recommend routine annual visits  2  Contraception methods discussed, she does not want any  3  Recommend to use prenatal vitamins  4  Postpartum tiredness sleep deprivation coping discussed        All questions were answered &  expressed understanding      Patient was seen and discussed with Dr Lorin Melchor MD  OBGYN PGY-1

## 2019-08-16 ENCOUNTER — OFFICE VISIT (OUTPATIENT)
Dept: INTERNAL MEDICINE CLINIC | Facility: CLINIC | Age: 29
End: 2019-08-16

## 2019-08-16 VITALS
DIASTOLIC BLOOD PRESSURE: 66 MMHG | HEIGHT: 64 IN | WEIGHT: 118.61 LBS | HEART RATE: 104 BPM | TEMPERATURE: 98 F | BODY MASS INDEX: 20.25 KG/M2 | SYSTOLIC BLOOD PRESSURE: 84 MMHG

## 2019-08-16 DIAGNOSIS — E55.9 VITAMIN D DEFICIENCY: ICD-10-CM

## 2019-08-16 DIAGNOSIS — M54.50 ACUTE BILATERAL LOW BACK PAIN WITHOUT SCIATICA: ICD-10-CM

## 2019-08-16 DIAGNOSIS — E58 CALCIUM DEFICIENCY: ICD-10-CM

## 2019-08-16 DIAGNOSIS — Z00.00 ROUTINE ADULT HEALTH MAINTENANCE: Primary | ICD-10-CM

## 2019-08-16 DIAGNOSIS — Z13.220 SCREENING, LIPID: ICD-10-CM

## 2019-08-16 DIAGNOSIS — D72.829 LEUKOCYTOSIS, UNSPECIFIED TYPE: ICD-10-CM

## 2019-08-16 PROBLEM — Z3A.40 40 WEEKS GESTATION OF PREGNANCY: Status: RESOLVED | Noted: 2019-05-16 | Resolved: 2019-08-16

## 2019-08-16 PROBLEM — O09.33 INSUFFICIENT PRENATAL CARE IN THIRD TRIMESTER: Status: RESOLVED | Noted: 2019-07-11 | Resolved: 2019-08-16

## 2019-08-16 PROBLEM — O26.843 FUNDAL HEIGHT LOW FOR DATES IN THIRD TRIMESTER: Chronic | Status: RESOLVED | Noted: 2019-05-30 | Resolved: 2019-08-16

## 2019-08-16 PROBLEM — O44.40 LOW-LYING PLACENTA: Status: RESOLVED | Noted: 2019-05-30 | Resolved: 2019-08-16

## 2019-08-16 PROCEDURE — 99385 PREV VISIT NEW AGE 18-39: CPT | Performed by: PHYSICIAN ASSISTANT

## 2019-08-16 NOTE — PROGRESS NOTES
Assessment/Plan:      Diagnoses and all orders for this visit:    Routine adult health maintenance  -     Lipid panel; Future     (spontaneous vaginal delivery)  -     CBC and differential; Future  -     Comprehensive metabolic panel; Future  -     Lipid panel; Future  -     TSH, 3rd generation with Free T4 reflex; Future    Acute bilateral low back pain without sciatica    Vitamin D deficiency  -     Comprehensive metabolic panel; Future  -     Lipid panel; Future  -     TSH, 3rd generation with Free T4 reflex; Future  -     Vitamin D 25 hydroxy; Future    Calcium deficiency  -     Comprehensive metabolic panel; Future  -     Lipid panel; Future  -     TSH, 3rd generation with Free T4 reflex; Future  -     Vitamin D 25 hydroxy; Future    Leukocytosis, unspecified type  -     CBC and differential; Future    Screening, lipid  -     Lipid panel; Future      18-year-old female presenting today to establish with our office and for annual physical   Overall she is in good health  Pre-existing conditions include low calcium and low vitamin-D  She will continue on supplementation as well as healthy diet  She is about 22 weeks postpartum spontaneous vaginal delivery to a healthy baby boy at 40 weeks without any complication to mother baby  She is currently breast-feeding and doing very well aside from fatigue from some sleepless nights  No depression or anxiety noted postpartum  She does express some concern of some low back discomfort bilaterally without radiation but only experiences for a few seconds with bending over or lifting something and it does not inhibit her  Most likely musculoskeletal and I advised him gentle stretches as demonstrated in the office as well as heating pad or moist heat  She is interested in routine screening labs so I will order CBC, CMP, lipid, TSH and vitamin D and she will get this done at her earliest convenience and we will call her with results    She will continue following with CARMINE although she did have her postpartum follow-up on 19, routine GYN care deferred to GYN  Tdap up-to-date  Questions addressed  Follow up in 1 year, sooner with any concerns  Chief Complaint   Patient presents with    Physical Exam     pt is here to re establish care, no concerns to discuss just full check up and lab check       Subjective:     Patient ID: Ari Humphrey is a 30 y o  female  25y/o female here today to establish and general physical     She overall is doing well, post partum 22 days   19 at 40 weeks, healthy baby boy, no maternal or infant complications  Currently breast feeding  Denies any post partum anxiety/depression  She does note sleeplessness as she gets up about 3 times a night to feed  Also has 2 older children  She is not under any medical tx aside from vit D and calcium -would like calcium checked states hx of low  Currently taking Vit D and B6 and prenatal supplements  She is eating > 3 meals a day, good appetite, well-balanced diet, cheese/yogurt, meats, fruits/veggies  She does not exercise but very active at home  She does note low back B/L discomfort since delivery, only with bending over or picking something up, discomfort lasts just a few seconds  Currently resides with  and 3 children, feels safe at home  Patient family from [place]  Originally from [place], family [place], She practices Sikhism  Last dental/eye examinations unknown  Pt denies vision changes  She has dentist whom she will call for appt  Review of Systems   Constitutional: Negative  HENT: Negative  Eyes: Negative  Respiratory: Negative  Cardiovascular: Negative  Gastrointestinal: Negative  Genitourinary: Negative  Musculoskeletal:        As in HPI   Skin: Negative  Neurological: Negative  Hematological: Negative  Psychiatric/Behavioral: Negative            The following portions of the patient's history were reviewed and updated as appropriate: allergies, current medications, past family history, past medical history, past social history, past surgical history and problem list       Objective:     Physical Exam   Constitutional: She is oriented to person, place, and time  She appears well-developed and well-nourished  No distress  HENT:   Head: Normocephalic  Right Ear: Hearing, tympanic membrane and ear canal normal    Left Ear: Hearing, tympanic membrane and ear canal normal    Nose: Nose normal    Mouth/Throat: Oropharynx is clear and moist  Abnormal dentition  Eyes: Pupils are equal, round, and reactive to light  Conjunctivae, EOM and lids are normal    Neck: Neck supple  Normal carotid pulses present  Carotid bruit is not present  Cardiovascular: Normal rate, regular rhythm, normal heart sounds and normal pulses  No LE edema   Pulmonary/Chest: Effort normal and breath sounds normal    Abdominal: Soft  Normal appearance and bowel sounds are normal  There is no tenderness  Musculoskeletal:   Normal gait, no spine or muscular tenderness at present  Juany appearance, strength and movement of spine and extremities without obvious pain or limitation  Lymphadenopathy:        Head (right side): No submandibular and no tonsillar adenopathy present  Head (left side): No submandibular and no tonsillar adenopathy present  Neurological: She is alert and oriented to person, place, and time  Coordination and gait normal    Psychiatric: She has a normal mood and affect  Her speech is normal and behavior is normal  Thought content normal    Vitals reviewed        Vitals:    08/16/19 1415   BP: (!) 84/66   BP Location: Left arm   Patient Position: Sitting   Cuff Size: Adult   Pulse: 104   Temp: 98 °F (36 7 °C)   TempSrc: Oral   Weight: 53 8 kg (118 lb 9 7 oz)   Height: 5' 3 5" (1 613 m)

## 2019-08-19 ENCOUNTER — APPOINTMENT (OUTPATIENT)
Dept: LAB | Facility: CLINIC | Age: 29
End: 2019-08-19
Payer: COMMERCIAL

## 2019-08-19 DIAGNOSIS — E55.9 VITAMIN D DEFICIENCY: ICD-10-CM

## 2019-08-19 DIAGNOSIS — D72.829 LEUKOCYTOSIS, UNSPECIFIED TYPE: ICD-10-CM

## 2019-08-19 DIAGNOSIS — E58 CALCIUM DEFICIENCY: ICD-10-CM

## 2019-08-19 DIAGNOSIS — Z00.00 ROUTINE ADULT HEALTH MAINTENANCE: ICD-10-CM

## 2019-08-19 DIAGNOSIS — Z13.220 SCREENING, LIPID: ICD-10-CM

## 2019-08-19 LAB
25(OH)D3 SERPL-MCNC: 27.9 NG/ML (ref 30–100)
ALBUMIN SERPL BCP-MCNC: 3.7 G/DL (ref 3.5–5)
ALP SERPL-CCNC: 98 U/L (ref 46–116)
ALT SERPL W P-5'-P-CCNC: 22 U/L (ref 12–78)
ANION GAP SERPL CALCULATED.3IONS-SCNC: 5 MMOL/L (ref 4–13)
AST SERPL W P-5'-P-CCNC: 12 U/L (ref 5–45)
BASOPHILS # BLD AUTO: 0.03 THOUSANDS/ΜL (ref 0–0.1)
BASOPHILS NFR BLD AUTO: 1 % (ref 0–1)
BILIRUB SERPL-MCNC: 0.35 MG/DL (ref 0.2–1)
BUN SERPL-MCNC: 15 MG/DL (ref 5–25)
CALCIUM SERPL-MCNC: 8.9 MG/DL (ref 8.3–10.1)
CHLORIDE SERPL-SCNC: 105 MMOL/L (ref 100–108)
CHOLEST SERPL-MCNC: 228 MG/DL (ref 50–200)
CO2 SERPL-SCNC: 28 MMOL/L (ref 21–32)
CREAT SERPL-MCNC: 0.64 MG/DL (ref 0.6–1.3)
EOSINOPHIL # BLD AUTO: 0.28 THOUSAND/ΜL (ref 0–0.61)
EOSINOPHIL NFR BLD AUTO: 5 % (ref 0–6)
ERYTHROCYTE [DISTWIDTH] IN BLOOD BY AUTOMATED COUNT: 12.7 % (ref 11.6–15.1)
GFR SERPL CREATININE-BSD FRML MDRD: 119 ML/MIN/1.73SQ M
GLUCOSE P FAST SERPL-MCNC: 89 MG/DL (ref 65–99)
HCT VFR BLD AUTO: 42 % (ref 34.8–46.1)
HDLC SERPL-MCNC: 45 MG/DL (ref 40–60)
HGB BLD-MCNC: 13.4 G/DL (ref 11.5–15.4)
IMM GRANULOCYTES # BLD AUTO: 0.01 THOUSAND/UL (ref 0–0.2)
IMM GRANULOCYTES NFR BLD AUTO: 0 % (ref 0–2)
LDLC SERPL CALC-MCNC: 137 MG/DL (ref 0–100)
LYMPHOCYTES # BLD AUTO: 1.9 THOUSANDS/ΜL (ref 0.6–4.47)
LYMPHOCYTES NFR BLD AUTO: 31 % (ref 14–44)
MCH RBC QN AUTO: 29.1 PG (ref 26.8–34.3)
MCHC RBC AUTO-ENTMCNC: 31.9 G/DL (ref 31.4–37.4)
MCV RBC AUTO: 91 FL (ref 82–98)
MONOCYTES # BLD AUTO: 0.49 THOUSAND/ΜL (ref 0.17–1.22)
MONOCYTES NFR BLD AUTO: 8 % (ref 4–12)
NEUTROPHILS # BLD AUTO: 3.49 THOUSANDS/ΜL (ref 1.85–7.62)
NEUTS SEG NFR BLD AUTO: 55 % (ref 43–75)
NONHDLC SERPL-MCNC: 183 MG/DL
NRBC BLD AUTO-RTO: 0 /100 WBCS
PLATELET # BLD AUTO: 247 THOUSANDS/UL (ref 149–390)
PMV BLD AUTO: 10.4 FL (ref 8.9–12.7)
POTASSIUM SERPL-SCNC: 4 MMOL/L (ref 3.5–5.3)
PROT SERPL-MCNC: 7.6 G/DL (ref 6.4–8.2)
RBC # BLD AUTO: 4.6 MILLION/UL (ref 3.81–5.12)
SODIUM SERPL-SCNC: 138 MMOL/L (ref 136–145)
TRIGL SERPL-MCNC: 231 MG/DL
TSH SERPL DL<=0.05 MIU/L-ACNC: 1.83 UIU/ML (ref 0.36–3.74)
WBC # BLD AUTO: 6.2 THOUSAND/UL (ref 4.31–10.16)

## 2019-08-19 PROCEDURE — 36415 COLL VENOUS BLD VENIPUNCTURE: CPT

## 2019-08-19 PROCEDURE — 84443 ASSAY THYROID STIM HORMONE: CPT

## 2019-08-19 PROCEDURE — 85025 COMPLETE CBC W/AUTO DIFF WBC: CPT

## 2019-08-19 PROCEDURE — 80061 LIPID PANEL: CPT

## 2019-08-19 PROCEDURE — 80053 COMPREHEN METABOLIC PANEL: CPT

## 2019-08-19 PROCEDURE — 82306 VITAMIN D 25 HYDROXY: CPT

## 2019-09-05 DIAGNOSIS — O21.9 NAUSEA/VOMITING IN PREGNANCY: ICD-10-CM

## 2019-09-06 RX ORDER — DIPHENHYDRAMINE HYDROCHLORIDE 25 MG/1
CAPSULE ORAL
Qty: 90 TABLET | Refills: 1 | Status: SHIPPED | OUTPATIENT
Start: 2019-09-06 | End: 2020-06-10

## 2020-06-10 ENCOUNTER — OFFICE VISIT (OUTPATIENT)
Dept: INTERNAL MEDICINE CLINIC | Facility: CLINIC | Age: 30
End: 2020-06-10

## 2020-06-10 ENCOUNTER — TELEPHONE (OUTPATIENT)
Dept: INTERNAL MEDICINE CLINIC | Facility: CLINIC | Age: 30
End: 2020-06-10

## 2020-06-10 VITALS
SYSTOLIC BLOOD PRESSURE: 100 MMHG | TEMPERATURE: 97.9 F | BODY MASS INDEX: 20 KG/M2 | HEART RATE: 88 BPM | HEIGHT: 63 IN | DIASTOLIC BLOOD PRESSURE: 60 MMHG | WEIGHT: 112.88 LBS

## 2020-06-10 DIAGNOSIS — M79.642 LEFT HAND PAIN: Primary | ICD-10-CM

## 2020-06-10 DIAGNOSIS — R53.1 GENERALIZED WEAKNESS: ICD-10-CM

## 2020-06-10 DIAGNOSIS — E83.51 HYPOCALCEMIA: ICD-10-CM

## 2020-06-10 PROCEDURE — 3008F BODY MASS INDEX DOCD: CPT | Performed by: PHYSICIAN ASSISTANT

## 2020-06-10 PROCEDURE — 99214 OFFICE O/P EST MOD 30 MIN: CPT | Performed by: PHYSICIAN ASSISTANT

## 2020-06-10 PROCEDURE — 1036F TOBACCO NON-USER: CPT | Performed by: PHYSICIAN ASSISTANT

## 2020-06-11 ENCOUNTER — APPOINTMENT (OUTPATIENT)
Dept: LAB | Facility: HOSPITAL | Age: 30
End: 2020-06-11
Payer: COMMERCIAL

## 2020-06-11 DIAGNOSIS — E83.51 HYPOCALCEMIA: ICD-10-CM

## 2020-06-11 DIAGNOSIS — M79.642 LEFT HAND PAIN: ICD-10-CM

## 2020-06-11 DIAGNOSIS — R53.1 GENERALIZED WEAKNESS: ICD-10-CM

## 2020-06-11 LAB
25(OH)D3 SERPL-MCNC: 36.4 NG/ML (ref 30–100)
ALBUMIN SERPL BCP-MCNC: 3.7 G/DL (ref 3.5–5)
ALP SERPL-CCNC: 86 U/L (ref 46–116)
ALT SERPL W P-5'-P-CCNC: 12 U/L (ref 12–78)
ANION GAP SERPL CALCULATED.3IONS-SCNC: 6 MMOL/L (ref 4–13)
AST SERPL W P-5'-P-CCNC: 7 U/L (ref 5–45)
BASOPHILS # BLD AUTO: 0.05 THOUSANDS/ΜL (ref 0–0.1)
BASOPHILS NFR BLD AUTO: 1 % (ref 0–1)
BILIRUB SERPL-MCNC: 0.44 MG/DL (ref 0.2–1)
BUN SERPL-MCNC: 14 MG/DL (ref 5–25)
CALCIUM SERPL-MCNC: 9.1 MG/DL (ref 8.3–10.1)
CHLORIDE SERPL-SCNC: 108 MMOL/L (ref 100–108)
CO2 SERPL-SCNC: 26 MMOL/L (ref 21–32)
CREAT SERPL-MCNC: 0.53 MG/DL (ref 0.6–1.3)
EOSINOPHIL # BLD AUTO: 0.34 THOUSAND/ΜL (ref 0–0.61)
EOSINOPHIL NFR BLD AUTO: 6 % (ref 0–6)
ERYTHROCYTE [DISTWIDTH] IN BLOOD BY AUTOMATED COUNT: 12.7 % (ref 11.6–15.1)
GFR SERPL CREATININE-BSD FRML MDRD: 126 ML/MIN/1.73SQ M
GLUCOSE P FAST SERPL-MCNC: 85 MG/DL (ref 65–99)
HCT VFR BLD AUTO: 39.8 % (ref 34.8–46.1)
HGB BLD-MCNC: 13 G/DL (ref 11.5–15.4)
IMM GRANULOCYTES # BLD AUTO: 0.02 THOUSAND/UL (ref 0–0.2)
IMM GRANULOCYTES NFR BLD AUTO: 0 % (ref 0–2)
LYMPHOCYTES # BLD AUTO: 1.75 THOUSANDS/ΜL (ref 0.6–4.47)
LYMPHOCYTES NFR BLD AUTO: 30 % (ref 14–44)
MCH RBC QN AUTO: 29 PG (ref 26.8–34.3)
MCHC RBC AUTO-ENTMCNC: 32.7 G/DL (ref 31.4–37.4)
MCV RBC AUTO: 89 FL (ref 82–98)
MONOCYTES # BLD AUTO: 0.38 THOUSAND/ΜL (ref 0.17–1.22)
MONOCYTES NFR BLD AUTO: 7 % (ref 4–12)
NEUTROPHILS # BLD AUTO: 3.27 THOUSANDS/ΜL (ref 1.85–7.62)
NEUTS SEG NFR BLD AUTO: 56 % (ref 43–75)
NRBC BLD AUTO-RTO: 0 /100 WBCS
PLATELET # BLD AUTO: 258 THOUSANDS/UL (ref 149–390)
PMV BLD AUTO: 9.3 FL (ref 8.9–12.7)
POTASSIUM SERPL-SCNC: 3.7 MMOL/L (ref 3.5–5.3)
PROT SERPL-MCNC: 7.2 G/DL (ref 6.4–8.2)
RBC # BLD AUTO: 4.48 MILLION/UL (ref 3.81–5.12)
SODIUM SERPL-SCNC: 140 MMOL/L (ref 136–145)
TSH SERPL DL<=0.05 MIU/L-ACNC: 3.13 UIU/ML (ref 0.36–3.74)
VIT B12 SERPL-MCNC: 455 PG/ML (ref 100–900)
WBC # BLD AUTO: 5.81 THOUSAND/UL (ref 4.31–10.16)

## 2020-06-11 PROCEDURE — 82607 VITAMIN B-12: CPT

## 2020-06-11 PROCEDURE — 36415 COLL VENOUS BLD VENIPUNCTURE: CPT

## 2020-06-11 PROCEDURE — 85025 COMPLETE CBC W/AUTO DIFF WBC: CPT

## 2020-06-11 PROCEDURE — 82306 VITAMIN D 25 HYDROXY: CPT

## 2020-06-11 PROCEDURE — 84443 ASSAY THYROID STIM HORMONE: CPT

## 2020-06-11 PROCEDURE — 80053 COMPREHEN METABOLIC PANEL: CPT

## 2021-05-18 ENCOUNTER — IMMUNIZATIONS (OUTPATIENT)
Dept: FAMILY MEDICINE CLINIC | Facility: HOSPITAL | Age: 31
End: 2021-05-18

## 2021-05-18 DIAGNOSIS — Z23 ENCOUNTER FOR IMMUNIZATION: Primary | ICD-10-CM

## 2021-05-18 PROCEDURE — 91300 SARS-COV-2 / COVID-19 MRNA VACCINE (PFIZER-BIONTECH) 30 MCG: CPT

## 2021-05-18 PROCEDURE — 0001A SARS-COV-2 / COVID-19 MRNA VACCINE (PFIZER-BIONTECH) 30 MCG: CPT

## 2021-06-08 ENCOUNTER — IMMUNIZATIONS (OUTPATIENT)
Dept: FAMILY MEDICINE CLINIC | Facility: HOSPITAL | Age: 31
End: 2021-06-08

## 2021-06-08 DIAGNOSIS — Z23 ENCOUNTER FOR IMMUNIZATION: Primary | ICD-10-CM

## 2021-06-08 PROCEDURE — 91300 SARS-COV-2 / COVID-19 MRNA VACCINE (PFIZER-BIONTECH) 30 MCG: CPT

## 2021-06-08 PROCEDURE — 0002A SARS-COV-2 / COVID-19 MRNA VACCINE (PFIZER-BIONTECH) 30 MCG: CPT

## 2023-05-03 ENCOUNTER — INITIAL PRENATAL (OUTPATIENT)
Dept: OBGYN CLINIC | Facility: CLINIC | Age: 33
End: 2023-05-03

## 2023-05-03 DIAGNOSIS — Z34.91 PRENATAL CARE IN FIRST TRIMESTER: Primary | ICD-10-CM

## 2023-05-03 NOTE — LETTER
Work Letter    Mikki Ragland  1990  [place] [address]    Dear Mikki Ragland,      05/03/23        Your employee is a patient at [facility]  We recommend that all pregnant women:    1  Have a well-ventilated workspace  2  Wear low-heeled shoes  3  Work no more than 40 hours per week  4  Have a 15 minute break every 2 hours and at least 30 minutes for a meal break  5  Use good body mechanics by bending at your knees to avoid back strain and lift no more than 20 pounds without assistance  Will need assistance with lifting over 20 lbs  6  Have ready access to bathrooms and water  She may continue to work until her due date unless medical complications arise  We anticipate she may return to work in 6-8 weeks after delivery       Sincerely,  1 Chelsea Townsend

## 2023-05-03 NOTE — LETTER
Dentist Letter    Albert Tucker  1990  [facility] [address]          05/03/23    We have had several requests from local dentist requesting permission to perform procedures on our patients who are pregnant  We wish to respond with this letter regarding some of the more routine procedures that we have been asked about  The following procedures may be performed on our obstetric patients:   1  Administration of local anesthesia   2  Administration of antibiotics such as PCN, Ampicillin, and Erythromycin  3  Administration of pain medications such as Tylenol, Tylenol with codeine, and if needed Percocet  4  Shielded X-rays    Should you have any questions, please do not hesitate to contact at [phone]          Sincerely,    1 [facility]   [phone]

## 2023-05-03 NOTE — LETTER
[facility] Letter    Windhamrose Silver  1990  [facility] [address]       05/03/23          Jared Silver is a patient and under our care in our office  Yulia Waters's Estimated Date of Delivery: 11/27/23  Any questions or concerns feel free to contact our office       Thank you,  [address]  [id number] [facility]  [facility]/Melanie Ralph [address] [place] [address]/[address]  [phone]

## 2023-05-03 NOTE — PROGRESS NOTES
OB INTAKE INTERVIEW  Pt presents for OB intake  [id number]  OB History    Para Term  AB Living   5 3 3   1 3   SAB IAB Ectopic Multiple Live Births   1     0 3      # Outcome Date GA Lbr Oscar/2nd Weight Sex Delivery Anes PTL Lv   5 Current            4 Term 19 40w2d / 00:23 3430 g (7 lb 9 oz) M Vag-Spont EPI N JAYSON   3 SAB 2016           2 Term      Vag-Spont   JAYSON   1 Term      Vag-Spont   JAYSON     Hx of  delivery prior to 36 weeks 6 days:  No   If yes, place a referral for cervical surveillance at 16 weeks  Last Menstrual Period:    Patient's last menstrual period was 2023  Ultrasound date: 2023  8 weeks 3 days     Estimated Date of Delivery: 23   by LMP  H&P visit scheduled  2023 @ 1315  with Dr Sparkle Carrillo     Last pap smear: 2019  Findings; lab pap smear results: no abnormalities    Current Issues:  Constipation :   No  Headaches :   No  Cramping:  Yes  Spotting :   No  PICA cravings :  No  FOB Involved:   Yes  Planned pregnancy:  No  I have these concerns about this prenatal patient:   Reports some abdominal cramping throughout the day  Denies spotting  [facility] for visits  No male providers  Encouraged to complete 1st trimester labs  MFM referral placed  Unsure about epidural  Desires breastfeeding  Requested information for financial counselor  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  Referral for Care Management      Interview education   [facility] Pregnancy Essentials reviewed and discussed    Baby and Me [address] Handout  [facility] MFM Handout   Discussed genetic testing   Prenatal lab work: Scripts printed and given to pt   Influenza vaccine given today: No   Discussed Tdap vaccine     Immunizations:   Immunization History   Administered Date(s) Administered    COVID-19 PFIZER VACCINE 0 3 ML IM 2021, 2021    HPV Quadrivalent 2013    Tdap 2013, 2019     Depression Screening Follow-up Plan: Patient's depression screening was negative with an [place] score of  1  Clinically patient does not have depression  No treatment is required     Nurse/Family Partnership- referral placed:  N/A   If yes, place referral for nurse family partnership  Tobacco Cessation Counseling: non-smoker  Infection Screening: Does the pt have a hx of MRSA? No  If yes- please follow MRSA protocol and obtain a nasal swab for MRSA culture  The patient was oriented to our practice and all questions were answered    Interviewed by: Delfino Blount RN 05/03/23

## 2023-05-03 NOTE — LETTER
Proof of Pregnancy Letter    Fior Mueller  1990  [facility] [address]        05/03/23      Fior Mueller is a patient at our facility  Fior Mueller Estimated Date of Delivery: 11/27/23       Any questions or concerns, please feel free to contact our office  Sincerely,    1 [facility]    [address] [address], [facility]

## 2023-05-05 ENCOUNTER — PATIENT OUTREACH (OUTPATIENT)
Dept: OBGYN CLINIC | Facility: CLINIC | Age: 33
End: 2023-05-05

## 2023-05-05 ENCOUNTER — TELEPHONE (OUTPATIENT)
Facility: HOSPITAL | Age: 33
End: 2023-05-05

## 2023-05-05 DIAGNOSIS — Z78.9 NEEDS ASSISTANCE WITH COMMUNITY RESOURCES: Primary | ICD-10-CM

## 2023-05-05 NOTE — TELEPHONE ENCOUNTER
Called patient to schedule MFM appointment, based on referral issued to Maternal Fetal Medicine by [facility] office  Left voicemail requesting patient to call back and schedule appointment, with office number for return call [phone]

## 2023-05-05 NOTE — PROGRESS NOTES
REINALDO received a new a referral in regard to new pre  pt  MACICM contacted pt and introduced self and role  Pt agreeable to complete prenatal psychosocial via phone  Pt has had five previous pregnancies  Per pt all of her pregnancies have been planned  She has three children ages 12,10, and four that reside with her and her spouse  Pt, her spouse, and family and friends are all very happy about the news of the pregnancy  Pt reports no MH history and shares that her mood has been good during the current pregnancy  Pt is not employed and her spouse supports the family  Pt does express interest in applying for food stamps and WIC  Per pt she has tried before and has some difficulty understanding the process  MACI will refer pt to [facility] to assist with same  Pt does drive and the family has a vehicle  Per pt her spouse is her biggest support and his family also lives a few blocks away  Pts MIL is also very supportive  Pt reports drug, alcohol, or cigarette use ever  No other concerns noted  Pt is very happy about this pregnancy  MACI will refer pt to [facility] to assist in applying for food stamps and WIC  SWCM will remain available

## 2023-05-09 ENCOUNTER — PATIENT OUTREACH (OUTPATIENT)
Dept: PEDIATRICS CLINIC | Facility: CLINIC | Age: 33
End: 2023-05-09

## 2023-05-09 NOTE — PROGRESS NOTES
5/9/2023  Outgoing Call    [address] [place] [address] received referral to assist with SNAP and WIC  CMOC called mom who agreed to services  Mom prefers to complete all applications in person with her  present  Mom sees MACI Rodrigues in [place] and would like to meet there to work on applications  Mom would also like to meet with Deandre Soto, the financial counselor  [address] [place] [address] sent message to Ronaldo Ortiz at [facility] to see if she is able to meet with mom  Ronaldo Ortiz agreed to take over mom's case  CMOC to transfer referral to Ronaldo Ortiz

## 2023-05-15 ENCOUNTER — PATIENT OUTREACH (OUTPATIENT)
Dept: PEDIATRICS CLINIC | Facility: CLINIC | Age: 33
End: 2023-05-15

## 2023-05-15 NOTE — PROGRESS NOTES
[facility] received incoming referral from cmoc to assist pt with SNAP application  CMOC explained role and reason for call  Patient stated she spoke to a friend and was referred to the financial counselor Chaparro Mtz  Patient stated friend told her he helped her with snap application and ins  So, she and  would like to go to him  I told patient they only handle financial side of things and that is the reason to why I was calling  Pt stated that she would like to still try to go where friend referred her and she will save my number just in case he can't help her  CMOC will close case  Will be available of pt calls back needing assistance with application

## 2023-05-19 ENCOUNTER — APPOINTMENT (OUTPATIENT)
Dept: LAB | Facility: CLINIC | Age: 33
End: 2023-05-19

## 2023-05-19 DIAGNOSIS — O36.80X0 ENCOUNTER TO DETERMINE FETAL VIABILITY OF PREGNANCY, SINGLE OR UNSPECIFIED FETUS: ICD-10-CM

## 2023-05-19 LAB
ABO GROUP BLD: NORMAL
BASOPHILS # BLD AUTO: 0.04 THOUSANDS/ÂΜL (ref 0–0.1)
BASOPHILS NFR BLD AUTO: 1 % (ref 0–1)
BLD GP AB SCN SERPL QL: NEGATIVE
EOSINOPHIL # BLD AUTO: 0.21 THOUSAND/ÂΜL (ref 0–0.61)
EOSINOPHIL NFR BLD AUTO: 3 % (ref 0–6)
ERYTHROCYTE [DISTWIDTH] IN BLOOD BY AUTOMATED COUNT: 13.2 % (ref 11.6–15.1)
HBV SURFACE AB SER-ACNC: 6.35 MIU/ML
HBV SURFACE AG SER QL: NORMAL
HCT VFR BLD AUTO: 36.4 % (ref 34.8–46.1)
HGB BLD-MCNC: 12.6 G/DL (ref 11.5–15.4)
HIV 1+2 AB+HIV1 P24 AG SERPL QL IA: NORMAL
HIV 2 AB SERPL QL IA: NORMAL
HIV1 AB SERPL QL IA: NORMAL
HIV1 P24 AG SERPL QL IA: NORMAL
IMM GRANULOCYTES # BLD AUTO: 0.03 THOUSAND/UL (ref 0–0.2)
IMM GRANULOCYTES NFR BLD AUTO: 1 % (ref 0–2)
LYMPHOCYTES # BLD AUTO: 1.06 THOUSANDS/ÂΜL (ref 0.6–4.47)
LYMPHOCYTES NFR BLD AUTO: 16 % (ref 14–44)
MCH RBC QN AUTO: 29.8 PG (ref 26.8–34.3)
MCHC RBC AUTO-ENTMCNC: 34.6 G/DL (ref 31.4–37.4)
MCV RBC AUTO: 86 FL (ref 82–98)
MONOCYTES # BLD AUTO: 0.39 THOUSAND/ÂΜL (ref 0.17–1.22)
MONOCYTES NFR BLD AUTO: 6 % (ref 4–12)
NEUTROPHILS # BLD AUTO: 4.77 THOUSANDS/ÂΜL (ref 1.85–7.62)
NEUTS SEG NFR BLD AUTO: 73 % (ref 43–75)
NRBC BLD AUTO-RTO: 0 /100 WBCS
PLATELET # BLD AUTO: 274 THOUSANDS/UL (ref 149–390)
PMV BLD AUTO: 10.1 FL (ref 8.9–12.7)
RBC # BLD AUTO: 4.23 MILLION/UL (ref 3.81–5.12)
RH BLD: POSITIVE
RUBV IGG SERPL IA-ACNC: 71.2 IU/ML
SPECIMEN EXPIRATION DATE: NORMAL
TREPONEMA PALLIDUM IGG+IGM AB [PRESENCE] IN SERUM OR PLASMA BY IMMUNOASSAY: NORMAL
WBC # BLD AUTO: 6.5 THOUSAND/UL (ref 4.31–10.16)

## 2023-05-21 LAB — BACTERIA UR CULT: ABNORMAL

## 2023-05-25 ENCOUNTER — ROUTINE PRENATAL (OUTPATIENT)
Dept: PERINATAL CARE | Facility: CLINIC | Age: 33
End: 2023-05-25

## 2023-05-25 VITALS
DIASTOLIC BLOOD PRESSURE: 62 MMHG | HEART RATE: 98 BPM | SYSTOLIC BLOOD PRESSURE: 100 MMHG | HEIGHT: 63 IN | WEIGHT: 118.8 LBS | BODY MASS INDEX: 21.05 KG/M2

## 2023-05-25 DIAGNOSIS — Z3A.13 13 WEEKS GESTATION OF PREGNANCY: Primary | ICD-10-CM

## 2023-05-25 DIAGNOSIS — O34.81 OVARIAN CYST DURING PREGNANCY IN FIRST TRIMESTER: ICD-10-CM

## 2023-05-25 DIAGNOSIS — N83.209 OVARIAN CYST DURING PREGNANCY IN FIRST TRIMESTER: ICD-10-CM

## 2023-05-25 DIAGNOSIS — Z34.91 PRENATAL CARE IN FIRST TRIMESTER: ICD-10-CM

## 2023-05-25 DIAGNOSIS — Z36.82 ENCOUNTER FOR (NT) NUCHAL TRANSLUCENCY SCAN: ICD-10-CM

## 2023-05-25 PROBLEM — R10.9 ABDOMINAL PAIN AFFECTING PREGNANCY: Status: ACTIVE | Noted: 2023-05-25

## 2023-05-25 PROBLEM — O26.899 ABDOMINAL PAIN AFFECTING PREGNANCY: Status: ACTIVE | Noted: 2023-05-25

## 2023-05-25 PROBLEM — O09.521 MULTIGRAVIDA OF ADVANCED MATERNAL AGE IN FIRST TRIMESTER: Status: ACTIVE | Noted: 2023-05-25

## 2023-05-25 RX ORDER — ASCORBATE CALCIUM 500 MG
500 TABLET ORAL DAILY
COMMUNITY

## 2023-05-25 NOTE — PROGRESS NOTES
"[address]: Ms Rigoberto Mendoza was seen today at 13w3d for nuchal translucency ultrasound  See ultrasound report under \"OB Procedures\" tab  My recommendations are as follows:  1  We reviewed the availability of genetic screening, as well as diagnostic testing, which are available to all pregnant women  We reviewed limitations, risks, and benefits of screening and testing  She does not wish to pursue aneuploidy screening or diagnostic testing at this time  MSAFP screening should be ordered through your office at 15-20 weeks gestation, and completed prior to fetal anatomic survey  A detailed anatomic survey as well as transvaginal cervical length screening are recommended between 18-22 weeks gestation  Observation only is advised for her ovarian cyst, which has benign features  2  I advised that she be evaluated by her primary care physician regarding her new onset upper abdominal pain, which is not typical in early pregnancy  She agrees and will call to schedule an appointment       Please don't hesitate to contact our office with any concerns or questions     -Kerri Ruiz MD  "

## 2023-05-25 NOTE — LETTER
"May 25, 2023     Mohit Velazquez MD  [address] [address]    Patient: Lisset Riley   YOB: 1990   Date of Visit: 5/25/2023       Dear Dr Yi Mendoza:    Thank you for referring Lenka Kuhn to me for evaluation  Below are my notes for this consultation  If you have questions, please do not hesitate to call me  I look forward to following your patient along with you  Sincerely,        Prasanna Aguiar MD        CC: No Recipients    Prasanna Aguiar MD  5/25/2023  2:37 PM  Sign when Signing Visit  [address]: Ms Danny Christianson was seen today at 13w3d for nuchal translucency ultrasound  See ultrasound report under \"OB Procedures\" tab  My recommendations are as follows:  1  We reviewed the availability of genetic screening, as well as diagnostic testing, which are available to all pregnant women  We reviewed limitations, risks, and benefits of screening and testing  She does not wish to pursue aneuploidy screening or diagnostic testing at this time  MSAFP screening should be ordered through your office at 15-20 weeks gestation, and completed prior to fetal anatomic survey  A detailed anatomic survey as well as transvaginal cervical length screening are recommended between 18-22 weeks gestation  Observation only is advised for her ovarian cyst, which has benign features  2  I advised that she be evaluated by her primary care physician regarding her new onset upper abdominal pain, which is not typical in early pregnancy  She agrees and will call to schedule an appointment       Please don't hesitate to contact our office with any concerns or questions     -Prasanna Aguiar MD    "

## 2023-05-31 ENCOUNTER — ROUTINE PRENATAL (OUTPATIENT)
Dept: OBGYN CLINIC | Facility: CLINIC | Age: 33
End: 2023-05-31

## 2023-05-31 VITALS — BODY MASS INDEX: 21.08 KG/M2 | SYSTOLIC BLOOD PRESSURE: 112 MMHG | DIASTOLIC BLOOD PRESSURE: 64 MMHG | WEIGHT: 119 LBS

## 2023-05-31 DIAGNOSIS — Z34.92 PRENATAL CARE IN SECOND TRIMESTER: Primary | ICD-10-CM

## 2023-05-31 DIAGNOSIS — Z3A.14 14 WEEKS GESTATION OF PREGNANCY: ICD-10-CM

## 2023-05-31 PROCEDURE — 87591 N.GONORRHOEAE DNA AMP PROB: CPT | Performed by: NURSE PRACTITIONER

## 2023-05-31 PROCEDURE — 87491 CHLMYD TRACH DNA AMP PROBE: CPT | Performed by: NURSE PRACTITIONER

## 2023-05-31 NOTE — PROGRESS NOTES
Pt is here for her 14w prenatal  gc due  Pt denies any swelling, leakage,bleeding,pressure, and contractions  Pt doing well no concerns

## 2023-05-31 NOTE — PROGRESS NOTES
Prenatal H&P  [facility]    Subjective:  Patient is a [id number] here for initial prenatal H&P  This is an intended pregnancy  Patient reports that her LMP was 23  Patient is currently doing well  She is here alone today  Her previous pregnancies have been uncomplicated  She currently works as a homemaker  She has a good support system at home, lives with her  and their 3 children  She does not have a known family history of inheritable conditions such as physical or intellectual disabilities, birth defects, blood disorders, heart or neural tube defects  She denies recent travel  She denies use of nicotine, EtOH or recreational drug use  OB History    Para Term  AB Living   6 3 3   2 3   SAB IAB Ectopic Multiple Live Births   2     0 3      # Outcome Date GA Lbr Oscar/2nd Weight Sex Delivery Anes PTL Lv   6 Current            5 Term 19 40w2d / 00:23 3430 g (7 lb 9 oz) M Vag-Spont EPI N JAYSON   4 2017           3 2016           2 Term      Vag-Spont   JAYSON   1 Term      Vag-Spont   JAYSON         Objective:   /64   Wt 54 kg (119 lb)   LMP 2023   BMI 21 08 kg/m²     General:   alert and oriented, in no acute distress, appears stated age and cooperative   Heart: regular rate and rhythm, S1, S2 normal, no murmur, click, rub or gallop   Lungs: clear to auscultation bilaterally   Abdomen: soft, non-tender, without masses or organomegaly   Vulva: normal   Vagina: normal mucosa, normal discharge   Cervix: multiparous appearance   Uterus: size consistent with 14 weeks   Adnexa: normal adnexa and no mass, fullness, tenderness         Assessment and Plan:  1  LMP 23 with an GLENNY 2023    2  TVUS on 23 showed EGA 9w0d with an GLENNY consistent with LMP  3  Pap smear last done in    4  GC/CT collected  5  Prenatal labs completed  6  Postpartum: patient plans on breastfeeding   Different methods of contraception were discussed with patient, including progesterone only oral pills, depo provera, nexplanon, mirena, and paragard  Patient would like to consider options, she is considering permanent sterilization  7  Delivery consent form to be signed at 28 weeks  8  Declines genetic screenings  9  Labor expectations discussed with patient, including appointment schedule, nutrition, weight gain, sexual intercourse, and nausea and vomiting  10  RTC in 4 weeks   Continue PNV QD

## 2023-06-01 ENCOUNTER — TELEPHONE (OUTPATIENT)
Dept: OBGYN CLINIC | Facility: CLINIC | Age: 33
End: 2023-06-01

## 2023-06-01 LAB
C TRACH DNA SPEC QL NAA+PROBE: NEGATIVE
N GONORRHOEA DNA SPEC QL NAA+PROBE: NEGATIVE

## 2023-06-01 NOTE — TELEPHONE ENCOUNTER
----- Message from [address] [place] [address] sent at 6/1/2023 12:42 PM EDT -----  Please let her know the sti culture is negative  Thank you!

## 2023-06-20 ENCOUNTER — TELEPHONE (OUTPATIENT)
Facility: HOSPITAL | Age: 33
End: 2023-06-20

## 2023-06-20 NOTE — TELEPHONE ENCOUNTER
Left voicemail informing patient of the change for her upcoming appointment on 7/13/23  Requested she give our office a call back at [phone] with any questions or if she would need to reschedule

## 2023-07-05 PROBLEM — O26.899 ABDOMINAL PAIN AFFECTING PREGNANCY: Status: RESOLVED | Noted: 2023-05-25 | Resolved: 2023-07-05

## 2023-07-05 PROBLEM — R10.9 ABDOMINAL PAIN AFFECTING PREGNANCY: Status: RESOLVED | Noted: 2023-05-25 | Resolved: 2023-07-05

## 2023-07-05 PROBLEM — Z3A.19 19 WEEKS GESTATION OF PREGNANCY: Status: ACTIVE | Noted: 2023-05-25

## 2023-07-05 NOTE — PROGRESS NOTES
[facility] VISIT  Name: Maurizio Gonzalez  MRN: [id number]  : 1990    ASSESSMENT/PLAN:  Problem List        Endocrine    Ovarian cyst during pregnancy in first trimester    Overview     2.50 x 2.11 x 2.11 cm, simple in appearance with a smooth lining            Other    20 weeks gestation of pregnancy    Overview     -Prenatal labs wnl  -Gonorrhea/chlamydia screening neg  -Cervical cancer screening: pap smear NILM in 2019  -Level II  scheduled for   -Declined aneuploidy screening  - Infant feeding plan is breastfeeding.  -Contraception plan is possibly sterilization, continue to discuss  - labor precautions advised. Return to office in 4 weeks         Multigravida of advanced maternal age in first trimester    Vaginal discharge in pregnancy    Current Assessment & Plan     Pt reports increase vaginal discharge over the last 3 days. Reports initially white in color and now yellow. On exam, normal physiologic discharge visible, white in color. Slides negative for clue cells, yeast cells - also reviewed by Dr. Sarita Lima. Explained at increase in discharge is common in pregnancy. Recommend to d/c monostat use. F/u if sxs change. SUBJECTIVE 26 y.o. [id number] 20w0d here for PN visit. She denies contractions. She denies leakage of fluid and vaginal bleeding. She has good fetal movement. Her pregnancy is uncomplicated. Reports increase in discharge and discomfort x 3 days. Had one in her first pregnancy back in . Has been using monostat on and off since then when she feels she has more discharge over the last several years. This current e Reports that discharge was initally white but now yellow. No pain, no dysuria. Has been using monostat last 3 days. Thinks last episode prior to this was 1 month ago. No other concerns or questions. OBJECTIVE:  Vitals:    07/10/23 1000   BP: 94/65   Pulse: 90       Physical Exam  Vitals reviewed.    Constitutional: General: She is not in acute distress. Appearance: Normal appearance. HENT:      Head: Normocephalic and atraumatic. Right Ear: External ear normal.      Left Ear: External ear normal.      Nose: Nose normal.      Mouth/Throat:      Pharynx: Oropharynx is clear. Eyes:      Conjunctiva/sclera: Conjunctivae normal.   Cardiovascular:      Rate and Rhythm: Normal rate. Pulses: Normal pulses. Pulmonary:      Effort: Pulmonary effort is normal.   Abdominal:      Palpations: Abdomen is soft. Comments: gravid   Musculoskeletal:      Cervical back: Neck supple. Skin:     General: Skin is warm. Capillary Refill: Capillary refill takes less than 2 seconds. Neurological:      Mental Status: She is alert and oriented to person, place, and time. Gait: Gait normal.   Psychiatric:         Mood and Affect: Mood normal.         Behavior: Behavior normal.         Thought Content:  Thought content normal.         Judgment: Judgment normal.         Fundal height: 20  FHT: 140       Future Appointments   Date Time Provider [address] [place] [address]   2023  2:30 PM  US [address] [place] [address]   2023 10:00 AM Regina Angela MD [address] [facility]  Family Medicine  PGY-3  7/10/2023  11:06 AM

## 2023-07-10 ENCOUNTER — ROUTINE PRENATAL (OUTPATIENT)
Dept: OBGYN CLINIC | Facility: CLINIC | Age: 33
End: 2023-07-10

## 2023-07-10 VITALS
WEIGHT: 124 LBS | BODY MASS INDEX: 21.97 KG/M2 | HEIGHT: 63 IN | HEART RATE: 90 BPM | SYSTOLIC BLOOD PRESSURE: 94 MMHG | DIASTOLIC BLOOD PRESSURE: 65 MMHG

## 2023-07-10 DIAGNOSIS — Z3A.20 20 WEEKS GESTATION OF PREGNANCY: ICD-10-CM

## 2023-07-10 DIAGNOSIS — N89.8 VAGINAL DISCHARGE DURING PREGNANCY, ANTEPARTUM: ICD-10-CM

## 2023-07-10 DIAGNOSIS — N83.209 OVARIAN CYST DURING PREGNANCY IN FIRST TRIMESTER: Primary | ICD-10-CM

## 2023-07-10 DIAGNOSIS — O09.521 MULTIGRAVIDA OF ADVANCED MATERNAL AGE IN FIRST TRIMESTER: ICD-10-CM

## 2023-07-10 DIAGNOSIS — O26.899 VAGINAL DISCHARGE DURING PREGNANCY, ANTEPARTUM: ICD-10-CM

## 2023-07-10 DIAGNOSIS — O34.81 OVARIAN CYST DURING PREGNANCY IN FIRST TRIMESTER: Primary | ICD-10-CM

## 2023-07-10 PROCEDURE — 99213 OFFICE O/P EST LOW 20 MIN: CPT | Performed by: OBSTETRICS & GYNECOLOGY

## 2023-07-10 NOTE — ASSESSMENT & PLAN NOTE
Pt reports increase vaginal discharge over the last 3 days. Reports initially white in color and now yellow. On exam, normal physiologic discharge visible, white in color. Slides negative for clue cells, yeast cells - also reviewed by Dr. Garon Fabry. Explained at increase in discharge is common in pregnancy. Recommend to d/c monostat use. F/u if sxs change.

## 2023-07-13 ENCOUNTER — ROUTINE PRENATAL (OUTPATIENT)
Dept: PERINATAL CARE | Facility: OTHER | Age: 33
End: 2023-07-13
Payer: COMMERCIAL

## 2023-07-13 VITALS
HEIGHT: 63 IN | SYSTOLIC BLOOD PRESSURE: 102 MMHG | WEIGHT: 127.8 LBS | DIASTOLIC BLOOD PRESSURE: 68 MMHG | HEART RATE: 104 BPM | BODY MASS INDEX: 22.64 KG/M2

## 2023-07-13 DIAGNOSIS — Z36.86 ENCOUNTER FOR ANTENATAL SCREENING FOR CERVICAL LENGTH: ICD-10-CM

## 2023-07-13 DIAGNOSIS — O36.62X0 MACROSOMIA OF FETUS AFFECTING MANAGEMENT OF MOTHER IN SECOND TRIMESTER, SINGLE OR UNSPECIFIED FETUS: Primary | ICD-10-CM

## 2023-07-13 DIAGNOSIS — O09.521 MULTIGRAVIDA OF ADVANCED MATERNAL AGE IN FIRST TRIMESTER: ICD-10-CM

## 2023-07-13 DIAGNOSIS — Z3A.20 20 WEEKS GESTATION OF PREGNANCY: ICD-10-CM

## 2023-07-13 PROCEDURE — 76811 OB US DETAILED SNGL FETUS: CPT | Performed by: OBSTETRICS & GYNECOLOGY

## 2023-07-13 PROCEDURE — 76817 TRANSVAGINAL US OBSTETRIC: CPT | Performed by: OBSTETRICS & GYNECOLOGY

## 2023-07-13 PROCEDURE — 99214 OFFICE O/P EST MOD 30 MIN: CPT | Performed by: OBSTETRICS & GYNECOLOGY

## 2023-07-13 NOTE — LETTER
July 13, 2023     Efren Hale MD  [address] [facility]  [id number] [facility]  [address] [address]    Patient: Martín Hough   YOB: 1990   Date of Visit: 7/13/2023       Dear Dr. Chung Owens:    Thank you for referring Garrick Lepe to me for evaluation. Below are my notes for this consultation. If you have questions, please do not hesitate to call me. I look forward to following your patient along with you. Sincerely,        Radha Drake MD        CC: No Recipients    Radha Drake MD  7/13/2023  5:17 PM  Sign when Signing Visit  Martín Hough  has no complaints today at 20w3d. She reports fetal movements and does not report any vaginal bleeding or signs of labor. She declined genetic screening. She is here today for a ultrasound for anatomy. Shee states her first 2 pregnancies had babies that weighed 7 pounds  to 8 pounds and some ounces but she is not sure of the exact weights    Problem list:  1. Advanced maternal age  3. Right-sided 2.5 cm cyst on her ovary noted in the first trimester  3. During her first trimester ultrasound she complained about right upper quadrant pain which she found was secondary to eating raw vegetables. Once she decreased eating raw vegetables in her diet her pain has since resolved. 4. She reports that her mother has diabetes    Ultrasound findings: The ultrasound today shows fetus that is measuring 10 days ahead of dates. The amniotic fluid appears normal. There is a normal cervical length, and no malformations were detected. Both ovaries appear normal and there are no further ovarian cysts documented. Pregnancy ultrasound has limitations and is unable to detect all forms of fetal congenital abnormalities. Follow up recommended:   1. The family history of diabetes in her mother, her advanced maternal age and that her baby today is measuring 10 days ahead of dates I have ordered a an early Glucola screen for her to complete.   2. Recommend a follow-up ultrasound in 8 weeks for growth. 3. We reviewed her dating. She reports that she wrote her first day of her last menstrual period of 2/20/2023 down on a calendar and will check to confirm this date. When asked if she has regular periods she reports that every month her period comes between the 20th to the 25th of the month. Pre visit time reviewing her records   10 minutes  Face to face time 15 minutes  Post visit time on documentation of note, updating her problem list, adding orders and prescriptions 10 minutes. Procedures that were completed today were charged separately. The level of decision making was moderate complexity.     Destiney Pagan MD

## 2023-07-13 NOTE — PROGRESS NOTES
Ultrasound Probe Disinfection    A transvaginal ultrasound was performed. Prior to use, disinfection was performed with High Level Disinfection Process ([employer]). Probe serial number [id number]: [id number] was used.       Destiney Parks RDMS, RVT  07/13/23  2:31 PM

## 2023-07-13 NOTE — PROGRESS NOTES
Debbie Carvalho  has no complaints today at 20w3d. She reports fetal movements and does not report any vaginal bleeding or signs of labor. She declined genetic screening. She is here today for a ultrasound for anatomy. Moris states her first 2 pregnancies had babies that weighed 7 pounds  to 8 pounds and some ounces but she is not sure of the exact weights    Problem list:  1. Advanced maternal age  3. Right-sided 2.5 cm cyst on her ovary noted in the first trimester  3. During her first trimester ultrasound she complained about right upper quadrant pain which she found was secondary to eating raw vegetables. Once she decreased eating raw vegetables in her diet her pain has since resolved. 4. She reports that her mother has diabetes    Ultrasound findings: The ultrasound today shows fetus that is measuring 10 days ahead of dates. The amniotic fluid appears normal. There is a normal cervical length, and no malformations were detected. Both ovaries appear normal and there are no further ovarian cysts documented. Pregnancy ultrasound has limitations and is unable to detect all forms of fetal congenital abnormalities. Follow up recommended:   1. The family history of diabetes in her mother, her advanced maternal age and that her baby today is measuring 10 days ahead of dates I have ordered a an early Glucola screen for her to complete. 2. Recommend a follow-up ultrasound in 8 weeks for growth. 3. We reviewed her dating. She reports that she wrote her first day of her last menstrual period of 2/20/2023 down on a calendar and will check to confirm this date. When asked if she has regular periods she reports that every month her period comes between the 20th to the 25th of the month. Pre visit time reviewing her records   10 minutes  Face to face time 15 minutes  Post visit time on documentation of note, updating her problem list, adding orders and prescriptions 10 minutes.   Procedures that were completed today were charged separately. The level of decision making was moderate complexity.     Dash Edgar MD

## 2023-07-21 ENCOUNTER — PATIENT OUTREACH (OUTPATIENT)
Dept: OBGYN CLINIC | Facility: CLINIC | Age: 33
End: 2023-07-21

## 2023-07-21 NOTE — PROGRESS NOTES
MACI SANCHEZ spoke with Pt for follow up. Pt reported she is doing well. Pt still interested on SNAP but her spouse did not comply with the paper work needed. MACI SANCHEZ informed Pt application need to be completed again and Pt asked if they can go directly to the [facility] office. Pt was informed she can either go in person or apply on line. Web address send by email today. Pt denies other needs or concerns.

## 2023-08-06 PROBLEM — Z3A.24 24 WEEKS GESTATION OF PREGNANCY: Status: ACTIVE | Noted: 2023-05-25

## 2023-08-06 NOTE — ASSESSMENT & PLAN NOTE
No obstetric complaints today  Does not want to complete 1h gtt secondary to previous intolerance. Will do one week of blood glucose monitoring. Glucometer/lancets/test strips sent to pharmacy. Patient provided with glucose log chart that she will complete and bring to the office to review before her next prenatal appointment. TWG: + 5.715 kg  TDAP, Delivery consent at next office visit  Contraception: desires permanent sterilization.  Will sign MA-31 at next visit, see counseling under separate problem  Discussed  labor precautions and fetal kick counts    Return to clinic in 4 weeks

## 2023-08-06 NOTE — PROGRESS NOTES
OB/GYN prenatal visit    S: 26 y.o. [id number] 24w0d here for PN visit. She has no obstetric complaints, including pelvic pain, contractions, vaginal bleeding, loss of fluid, or decreased fetal movement. Recently had Level II US on 23, was recommended to complete early 1h gtt in setting of AMA, family hx of diabetes. Does not want to do the 1h gtt due to not previously tolerating well. Desires permanent sterilization. O:  Vitals:    23 1002   BP: 100/58   Pulse: 92   Resp: 18       Blood Pressure: 100/58  Wt=58.8 kg (129 lb 9.6 oz); Body mass index is 22.96 kg/m².; TWG=5.715 kg (12 lb 9.6 oz)  Fetal Heart Rate: 135; Fundal Height (cm): 24 cm    Gen: no acute distress, nonlabored breathing  Abdomen: gravid, nontender  Fundal Height (cm): 24 cm  Fetal Heart Rate: 135    Presentation: VTX via butterfly TAUS     A/P:    Problem List Items Addressed This Visit        Other    24 weeks gestation of pregnancy     No obstetric complaints today  Does not want to complete 1h gtt secondary to previous intolerance. Will do one week of blood glucose monitoring. Glucometer/lancets/test strips sent to pharmacy. Patient provided with glucose log chart that she will complete and bring to the office to review before her next prenatal appointment. TWG: + 5.715 kg  TDAP, Delivery consent at next office visit  Contraception: desires permanent sterilization. Will sign MA-31 at next visit, see counseling under separate problem  Discussed  labor precautions and fetal kick counts    Return to clinic in 4 weeks           Relevant Medications    Lancets (freestyle) lancets    Blood Glucose Monitoring Suppl (FreeStyle Lite) XUAN    glucose blood (FREESTYLE LITE) test strip    Request for sterilization     Patient highly desires permanent sterilization for contraception. She states she is complete with childbearing following this child.  She understands that BTL or bilateral salpingectomy is intended for permanent sterilization and is not intended to be a reversible form of birth control. Patient understands that though this procedure is intended to provide permanent sterilization, there is still a chance for failure of the procedure and that she may become pregnant in the future despite BTL/BS. She understands that with BTL there exists an increased risk of ectopic pregnancy were she to become pregnant postprocedure, and that this is considered an abnormal pregnancy and would likely result in miscarriage or termination, and that ectopic pregnancy poses a risk to her maternal health and well-being including a risk of death. Discussed that standard of care is now bilateral salpingectomy. Patient understands that there are alternative forms of birth control including abstinence, condoms or other barrier methods, OCPs, depo provera injection, ring, patch, Nexplanon, IUD - all of which are reversible and would allow for attempt of future pregnancy. She has declined these methods of birth control. Patient also declines vasectomy of partner as a method of birth control. Discussed that if she were to desire future childbearing s/p sterilization, she would require [employer]. Reviewed timing of MA-31 with plan to sign at 28 week visit. She is aware that if  section was indicated, sterilization could be performed at that time. However, would need to wait until 6 weeks postpartum for laparoscopic procedure.          Other Visit Diagnoses     Prenatal care in second trimester    -  Primary            Monty Haas MD  2023  11:03 AM

## 2023-08-07 ENCOUNTER — ROUTINE PRENATAL (OUTPATIENT)
Dept: OBGYN CLINIC | Facility: CLINIC | Age: 33
End: 2023-08-07

## 2023-08-07 VITALS
HEART RATE: 92 BPM | SYSTOLIC BLOOD PRESSURE: 100 MMHG | BODY MASS INDEX: 22.96 KG/M2 | DIASTOLIC BLOOD PRESSURE: 58 MMHG | WEIGHT: 129.6 LBS | HEIGHT: 63 IN | RESPIRATION RATE: 18 BRPM

## 2023-08-07 DIAGNOSIS — Z30.2 REQUEST FOR STERILIZATION: ICD-10-CM

## 2023-08-07 DIAGNOSIS — Z3A.24 24 WEEKS GESTATION OF PREGNANCY: ICD-10-CM

## 2023-08-07 DIAGNOSIS — Z34.92 PRENATAL CARE IN SECOND TRIMESTER: Primary | ICD-10-CM

## 2023-08-07 PROCEDURE — 99213 OFFICE O/P EST LOW 20 MIN: CPT | Performed by: OBSTETRICS & GYNECOLOGY

## 2023-08-07 RX ORDER — BLOOD-GLUCOSE METER
KIT MISCELLANEOUS
Qty: 100 EACH | Refills: 0 | Status: SHIPPED | OUTPATIENT
Start: 2023-08-07

## 2023-08-07 RX ORDER — LANCETS 28 GAUGE
EACH MISCELLANEOUS
Qty: 100 EACH | Refills: 0 | Status: SHIPPED | OUTPATIENT
Start: 2023-08-07

## 2023-08-07 RX ORDER — BLOOD-GLUCOSE METER
KIT MISCELLANEOUS
Qty: 1 EACH | Refills: 0 | Status: SHIPPED | OUTPATIENT
Start: 2023-08-07

## 2023-08-07 NOTE — ASSESSMENT & PLAN NOTE
Patient highly desires permanent sterilization for contraception. She states she is complete with childbearing following this child. She understands that BTL or bilateral salpingectomy is intended for permanent sterilization and is not intended to be a reversible form of birth control. Patient understands that though this procedure is intended to provide permanent sterilization, there is still a chance for failure of the procedure and that she may become pregnant in the future despite BTL/BS. She understands that with BTL there exists an increased risk of ectopic pregnancy were she to become pregnant postprocedure, and that this is considered an abnormal pregnancy and would likely result in miscarriage or termination, and that ectopic pregnancy poses a risk to her maternal health and well-being including a risk of death. Discussed that standard of care is now bilateral salpingectomy. Patient understands that there are alternative forms of birth control including abstinence, condoms or other barrier methods, OCPs, depo provera injection, ring, patch, Nexplanon, IUD - all of which are reversible and would allow for attempt of future pregnancy. She has declined these methods of birth control. Patient also declines vasectomy of partner as a method of birth control. Discussed that if she were to desire future childbearing s/p sterilization, she would require [employer]. Reviewed timing of MA-31 with plan to sign at 28 week visit. She is aware that if  section was indicated, sterilization could be performed at that time. However, would need to wait until 6 weeks postpartum for laparoscopic procedure.

## 2023-08-09 ENCOUNTER — APPOINTMENT (OUTPATIENT)
Dept: LAB | Facility: CLINIC | Age: 33
End: 2023-08-09
Payer: COMMERCIAL

## 2023-08-09 LAB — GLUCOSE 1H P 50 G GLC PO SERPL-MCNC: 106 MG/DL (ref 40–134)

## 2023-08-09 PROCEDURE — 82950 GLUCOSE TEST: CPT | Performed by: OBSTETRICS & GYNECOLOGY

## 2023-08-10 ENCOUNTER — TELEPHONE (OUTPATIENT)
Dept: PERINATAL CARE | Facility: CLINIC | Age: 33
End: 2023-08-10

## 2023-08-10 NOTE — TELEPHONE ENCOUNTER
----- Message from Leeanne Mahajan MD sent at 8/10/2023  8:44 AM EDT -----  Patient is not on MyChart. Will have nursing review her normal Glucola with her.     Leeanne Mahajan MD

## 2023-09-07 ENCOUNTER — ULTRASOUND (OUTPATIENT)
Dept: PERINATAL CARE | Facility: CLINIC | Age: 33
End: 2023-09-07
Payer: COMMERCIAL

## 2023-09-07 VITALS
BODY MASS INDEX: 23.88 KG/M2 | SYSTOLIC BLOOD PRESSURE: 98 MMHG | WEIGHT: 134.8 LBS | HEIGHT: 63 IN | HEART RATE: 90 BPM | DIASTOLIC BLOOD PRESSURE: 60 MMHG

## 2023-09-07 DIAGNOSIS — Z36.89 ENCOUNTER FOR ULTRASOUND TO CHECK FETAL GROWTH: ICD-10-CM

## 2023-09-07 DIAGNOSIS — O09.523 MULTIGRAVIDA OF ADVANCED MATERNAL AGE IN THIRD TRIMESTER: Primary | ICD-10-CM

## 2023-09-07 PROCEDURE — 76816 OB US FOLLOW-UP PER FETUS: CPT | Performed by: OBSTETRICS & GYNECOLOGY

## 2023-09-07 NOTE — PROGRESS NOTES
[id number]  [facility]: Ms. Cliff Cavazos was seen today for fetal growth assessment ultrasound. See ultrasound report under "OB Procedures" tab. Please don't hesitate to contact our office with any concerns or questions.   Danilo Singh MD

## 2023-09-07 NOTE — PATIENT INSTRUCTIONS
Thank you for choosing us for your  care today. If you have any questions about your ultrasound or care, please do not hesitate to contact us or your primary obstetrician. Some general instructions for your pregnancy are:    Exercise: Aim for 22 minutes per day (150 minutes per week) of regular exercise. Walking is great! Nutrition: Choose healthy sources of calcium, iron, and protein. Learn about Preeclampsia: preeclampsia is a common, serious high blood pressure complication in pregnancy. A blood pressure of 944RQVZ (systolic or top number) or 95CSJU (diastolic or bottom number) is not normal and needs evaluation by your doctor. Aspirin is sometimes prescribed in early pregnancy to prevent preeclampsia in women with risk factors - ask your obstetrician if you should be on this medication. For more resources, visit:  MapCoverage.fi  If you smoke, try to reduce how many cigarettes you smoke or try to quit completely. Do not vape. Other warning signs to watch out for in pregnancy or postpartum: chest pain, obstructed breathing or shortness of breath, seizures, thoughts of hurting yourself or your baby, bleeding, a painful or swollen leg, fever, or headache (see [employer] POST-BIRTH Warning Signs campaign). If these happen call 911. Itching is also not normal in pregnancy and if you experience this, especially over your hands and feet, potentially worse at night, notify your doctors.

## 2023-09-08 ENCOUNTER — ROUTINE PRENATAL (OUTPATIENT)
Dept: OBGYN CLINIC | Facility: CLINIC | Age: 33
End: 2023-09-08

## 2023-09-08 VITALS
WEIGHT: 134 LBS | BODY MASS INDEX: 23.74 KG/M2 | SYSTOLIC BLOOD PRESSURE: 108 MMHG | HEART RATE: 94 BPM | HEIGHT: 63 IN | DIASTOLIC BLOOD PRESSURE: 65 MMHG

## 2023-09-08 DIAGNOSIS — Z30.2 REQUEST FOR STERILIZATION: ICD-10-CM

## 2023-09-08 DIAGNOSIS — Z87.59 HISTORY OF VACUUM EXTRACTION ASSISTED DELIVERY: ICD-10-CM

## 2023-09-08 DIAGNOSIS — Z3A.28 28 WEEKS GESTATION OF PREGNANCY: Primary | ICD-10-CM

## 2023-09-08 LAB
DME PARACHUTE DELIVERY DATE REQUESTED: NORMAL
DME PARACHUTE ITEM DESCRIPTION: NORMAL
DME PARACHUTE ORDER STATUS: NORMAL
DME PARACHUTE SUPPLIER NAME: NORMAL
DME PARACHUTE SUPPLIER PHONE: NORMAL

## 2023-09-08 NOTE — PROGRESS NOTES
[facility]  Name: Angélica Quick  MRN: [id number]  : 1990      ASSESSMENT/PLAN: IUP @ 28w4d   Problem List Items Addressed This Visit        Other    28 weeks gestation of pregnancy - Primary     -Prenatal labs wnl  -Gonorrhea/chlamydia screening neg  -Cervical cancer screening: pap smear NILM in 2019  -Level II   WNL. Growth scan on EFW 1418 grams - 3 lbs 2 oz  ,80%  - 1hr GTT wnl  -Declined aneuploidy screening  - Infant feeding plan is breastfeeding.  -Delivery plan: , undecided on Epidural, delivery consent signed 23  -Contraception plan: Permanent Sterilization via Bilateral Salpingectomy, MA 31 signed  23 [ ] bridge? - Vaccinations: TDap on 2023  - RTO 2 weeks;Precuations reviewed          Relevant Orders    Tdap Vaccine greater than or equal to 6yo (Completed)    Request for sterilization     Counseling done 23   MAFareed signed at 28w visit 23           History of vacuum extraction assisted delivery     Hx of VAVD first pregnancy  Subsequent deliveries were             SUBJECTIVE   26 y.o. [id number] 28w4d here for PN visit. She has no obstetric complaints, including pelvic pain, contractions, vaginal bleeding, loss of fluid, or decreased fetal movement.        OBJECTIVE:  Vitals:    23 1113   BP: 108/65   Pulse: 94     TW.711 kg (17 lb)    Fundal height: 26cm   FHT: 144bpm        Marie Garcia MD  OB/GYN PGY-2  2023  9:39 PM

## 2023-09-08 NOTE — PROGRESS NOTES
28 week education packet provided to patient on 09/08/23. Included in packet:   Third Trimester paperwork  Delivery consent   Birthing room support person rules and acknowledgment  Birth Plan   Welcome information  Birth certificate worksheet   Consent for Photographers  Perineal/ Vaginal massage   Pediatric practices and locations     Tdap given  Breastpump order placed

## 2023-09-09 NOTE — ASSESSMENT & PLAN NOTE
-Prenatal labs wnl  -Gonorrhea/chlamydia screening neg  -Cervical cancer screening: pap smear NILM in 2019  -Level II US  WNL. Growth scan on EFW 1418 grams - 3 lbs 2 oz  ,80%  - 1hr GTT wnl  -Declined aneuploidy screening  - Infant feeding plan is breastfeeding.  -Delivery plan: , undecided on Epidural, delivery consent signed 23  -Contraception plan: Permanent Sterilization via Bilateral Salpingectomy, MA 31 signed  23 [ ] bridge?   - Vaccinations: TDap on 2023  - RTO 2 weeks;Precuations reviewed

## 2023-09-28 ENCOUNTER — ROUTINE PRENATAL (OUTPATIENT)
Dept: OBGYN CLINIC | Facility: CLINIC | Age: 33
End: 2023-09-28

## 2023-09-28 VITALS
DIASTOLIC BLOOD PRESSURE: 64 MMHG | SYSTOLIC BLOOD PRESSURE: 98 MMHG | HEART RATE: 101 BPM | HEIGHT: 63 IN | BODY MASS INDEX: 24.2 KG/M2 | WEIGHT: 136.6 LBS

## 2023-09-28 DIAGNOSIS — Z3A.31 31 WEEKS GESTATION OF PREGNANCY: ICD-10-CM

## 2023-09-28 DIAGNOSIS — Z34.93 PRENATAL CARE IN THIRD TRIMESTER: Primary | ICD-10-CM

## 2023-09-28 PROBLEM — N89.8 VAGINAL DISCHARGE IN PREGNANCY: Status: RESOLVED | Noted: 2023-07-10 | Resolved: 2023-09-28

## 2023-09-28 PROBLEM — O26.899 VAGINAL DISCHARGE IN PREGNANCY: Status: RESOLVED | Noted: 2023-07-10 | Resolved: 2023-09-28

## 2023-09-28 PROCEDURE — 99213 OFFICE O/P EST LOW 20 MIN: CPT | Performed by: OBSTETRICS & GYNECOLOGY

## 2023-09-28 NOTE — PROGRESS NOTES
Tiburcio Goldberg presents today for routine OB visit at 31w3d. Blood Pressure: 98/64  Wt=62 kg (136 lb 9.6 oz); Body mass index is 24.2 kg/m².; TWG=8.891 kg (19 lb 9.6 oz)   ; Fundal Height (cm): 33 cm  Abdomen: gravid, soft, non-tender. She reports no complaints. Denies uterine contractions. Denies vaginal bleeding or leaking of fluid. Reports adequate fetal movement of at least 10 movements in 2 hours once daily. Scheduled for ultrasound none. Patient does NOT desires a flu shot. Reviewed premature labor precautions and fetal kick counts. Advised to continue medications and return in 2 weeks. Current Outpatient Medications   Medication Instructions   • BIOTIN PO Oral, 2 tabs PO    • Blood Glucose Monitoring Suppl (FreeStyle Lite) XUAN Does not apply, Before Breakfast and 2 hours after meals   • Calcium Ascorbate (VITAMIN C) 500 mg, Daily   • CALCIUM PO Oral, 2 Tabs Po   • glucose blood (FREESTYLE LITE) test strip Use as instructed   • Lancets (freestyle) lancets Use as instructed   • Prenatal Vit-Iron Carbonyl-FA (prenatal multivitamin) TABS 1 tablet, Oral, Daily   • VITAMIN D PO Oral         Pregnancy Problems (from 23 to present)     Problem Noted Resolved    Request for sterilization 2023 by Nelly Mejia MD No    Overview Addendum 2023  9:35 PM by Juan Oconnell MD     Counseling done 23   MA-31 signed at 28w visit 23         28 weeks gestation of pregnancy 2023 by Remington Bustillos MD No    Overview Addendum 2023  9:34 PM by Juan Oconnell MD     -Prenatal labs wnl  -Gonorrhea/chlamydia screening neg  -Cervical cancer screening: pap smear NILM in 2019  -Level II US  WNL.  Growth scan on EFW 1418 grams - 3 lbs 2 oz  ,80%  - 1hr GTT wnl  -Declined aneuploidy screening  - Infant feeding plan is breastfeeding.  -Delivery plan: , undecided on Epidural, delivery consent signed 23  -Contraception plan: Permanent Sterilization via Bilateral Salpingectomy, MA 31 signed  09/08/23 [ ] bridge?   - Vaccinations: TDap on 09/08/2023  - RTO 2 weeks;Precuations reviewed          Multigravida of advanced maternal age in third trimester 5/25/2023 by Iqra Hargrove MD No

## 2023-10-13 ENCOUNTER — ROUTINE PRENATAL (OUTPATIENT)
Dept: OBGYN CLINIC | Facility: CLINIC | Age: 33
End: 2023-10-13

## 2023-10-13 VITALS
WEIGHT: 139 LBS | SYSTOLIC BLOOD PRESSURE: 107 MMHG | RESPIRATION RATE: 18 BRPM | DIASTOLIC BLOOD PRESSURE: 66 MMHG | HEIGHT: 63 IN | HEART RATE: 101 BPM | BODY MASS INDEX: 24.63 KG/M2

## 2023-10-13 DIAGNOSIS — Z3A.33 33 WEEKS GESTATION OF PREGNANCY: Primary | ICD-10-CM

## 2023-10-13 PROCEDURE — 99214 OFFICE O/P EST MOD 30 MIN: CPT | Performed by: OBSTETRICS & GYNECOLOGY

## 2023-10-13 NOTE — ASSESSMENT & PLAN NOTE
Patient has no complaints at this time  Patient will return to clinic in 1-2 weeks, whichever she prefers  Plans for an , she has had 3 prior vaginal deliveries
PAIN SCALE 7 OF 10.

## 2023-10-13 NOTE — PROGRESS NOTES
OB/GYN Prenatal Visit    SUBJECTIVE:  Angélica Quick is a 26 y.o. [id number] at 33w4d here for prenatal visit. She has no obstetric complaints and denies pelvic pain, cramping/contractions, vaginal bleeding, loss of fluid, or decreased fetal movement. She reports right hip pain that is provoked by increased movement. She reports it is not severe and she is not interested in trying tylenol at this time. Plans to deliver at [facility]. OBJECTIVE:  Vitals:    10/13/23 1046   BP: 107/66   Pulse: 101   Resp: 18     FHT: 131 bpm  Fundal height: 32.5 cm    Physical Exam  Constitutional:       General: She is not in acute distress. HENT:      Head: Normocephalic and atraumatic. Mouth/Throat:      Pharynx: Oropharynx is clear. Eyes:      General: No scleral icterus. Conjunctiva/sclera: Conjunctivae normal.   Cardiovascular:      Rate and Rhythm: Normal rate and regular rhythm. Pulses: Normal pulses. Heart sounds: Normal heart sounds. No murmur heard. Pulmonary:      Effort: Pulmonary effort is normal. No respiratory distress. Breath sounds: Normal breath sounds. Abdominal:      Palpations: Abdomen is soft. Tenderness: There is no abdominal tenderness. There is no guarding. Comments: Gravid   Musculoskeletal:      Right lower leg: No edema. Left lower leg: No edema. Neurological:      Mental Status: She is alert. Skin:     General: Skin is warm and dry. Coloration: Skin is not jaundiced. Psychiatric:         Mood and Affect: Mood normal.         Behavior: Behavior normal.   Vitals reviewed.        ASSESSMENT / PLAN:    33 weeks gestation of pregnancy  Patient has no complaints at this time  Patient will return to clinic in 1-2 weeks, whichever she prefers  Plans for an , she has had 3 prior vaginal deliveries      Jeanie Black MD  10/13/2023  11:07 AM

## 2023-11-05 PROBLEM — Z3A.37 37 WEEKS GESTATION OF PREGNANCY: Status: ACTIVE | Noted: 2023-11-05

## 2023-11-06 ENCOUNTER — ROUTINE PRENATAL (OUTPATIENT)
Dept: OBGYN CLINIC | Facility: CLINIC | Age: 33
End: 2023-11-06

## 2023-11-06 VITALS
DIASTOLIC BLOOD PRESSURE: 66 MMHG | HEIGHT: 63 IN | BODY MASS INDEX: 25.34 KG/M2 | SYSTOLIC BLOOD PRESSURE: 106 MMHG | HEART RATE: 98 BPM | WEIGHT: 143 LBS

## 2023-11-06 DIAGNOSIS — Z34.93 PRENATAL CARE IN THIRD TRIMESTER: Primary | ICD-10-CM

## 2023-11-06 DIAGNOSIS — Z3A.37 37 WEEKS GESTATION OF PREGNANCY: ICD-10-CM

## 2023-11-06 PROCEDURE — 87150 DNA/RNA AMPLIFIED PROBE: CPT | Performed by: OBSTETRICS & GYNECOLOGY

## 2023-11-06 PROCEDURE — 99213 OFFICE O/P EST LOW 20 MIN: CPT | Performed by: OBSTETRICS & GYNECOLOGY

## 2023-11-06 NOTE — PROGRESS NOTES
OB/GYN prenatal visit    S: 26 y.o. [id number] 37w0d here for PN visit. She has no obstetric complaints, including no contractions, vaginal bleeding, loss of fluid, or decreased fetal movement. O:  Vitals:    11/06/23 1000   BP: 106/66   Pulse: 98       Prenatal Labs: Blood Type:   Lab Results   Component Value Date/Time    ABO Grouping A 05/19/2023 08:37 AM     , D (Rh type):   Lab Results   Component Value Date/Time    Rh Factor Positive 05/19/2023 08:37 AM    HCT/HGB:   Lab Results   Component Value Date/Time    Hematocrit 36.4 05/19/2023 08:37 AM    Hemoglobin 12.6 05/19/2023 08:37 AM      , MCV:   Lab Results   Component Value Date/Time    MCV 86 05/19/2023 08:37 AM      , Platelets:   Lab Results   Component Value Date/Time    Platelets 907 89/91/5886 08:37 AM      , 1 hour Glucola:   Lab Results   Component Value Date/Time    Glucose 106 08/09/2023 09:42 AM     , Rubella:   Lab Results   Component Value Date/Time    Rubella IgG Quant 71.2 05/19/2023 08:37 AM        , VDRL/RPR:   Lab Results   Component Value Date/Time    RPR Non-Reactive 07/18/2019 09:48 PM     Urine Drug Screen:   Lab Results   Component Value Date/Time    Barbiturate Ur Negative 07/18/2019 09:48 PM    Benzodiazepine Urine Negative 07/18/2019 09:48 PM    THC Urine Negative 07/18/2019 09:48 PM    Cocaine Urine Negative 07/18/2019 09:48 PM    Methadone Urine Negative 07/18/2019 09:48 PM    Opiate Urine Negative 07/18/2019 09:48 PM    PCP Ur Negative 07/18/2019 09:48 PM   , Hep B:   Lab Results   Component Value Date/Time    Hepatitis B Surface Ag Non-reactive 05/19/2023 08:37 AM    HIV:   Lab Results   Component Value Date/Time    HIV-1/HIV-2 Ab Non-Reactive 02/28/2019 11:23 AM         General: AAOX3. No acute distress  Cardiovascular: Regular, Rate and Rhythm, no murmur, gallop or rub   Lungs: Clear to Auscultation Bilaterally, no wheezing, rhonchi or rales    Abdominal: Soft. NT/ND.  Gravid    Fundal height: 37 cm  FHT: 137 bmp A/P:  IUP at 37w0d  No obstetric complaints today  Complications: advance maternal age   Laboratory workup:   Initial OB labs (wnl);   2019 NILM pap smear; G/c wnl   36 week GBS cultures (collected )  Ultrasounds: Level 1 (wnl) Level II (wnl)  Vaccines: Flu vaccine (decline), TDAP vaccine (), COVID vaccine x 2   [place] indicated: patient A + not indicated   Contraception: patient would like tubal ligation, MA-31 on   Breastfeeding:   Birth plan:  and undecided on epidural; delivery consent signed 23  Discussed  labor precautions and fetal kick counts    Return to clinic in 1 weeks      [address], MD  2023  10:51 AM  PGY 2 FM

## 2023-11-08 LAB — GP B STREP DNA SPEC QL NAA+PROBE: NEGATIVE

## 2023-11-15 ENCOUNTER — ROUTINE PRENATAL (OUTPATIENT)
Dept: OBGYN CLINIC | Facility: CLINIC | Age: 33
End: 2023-11-15

## 2023-11-15 VITALS
BODY MASS INDEX: 24.91 KG/M2 | HEIGHT: 63 IN | HEART RATE: 97 BPM | SYSTOLIC BLOOD PRESSURE: 104 MMHG | WEIGHT: 140.6 LBS | DIASTOLIC BLOOD PRESSURE: 77 MMHG

## 2023-11-15 DIAGNOSIS — Z3A.38 38 WEEKS GESTATION OF PREGNANCY: Primary | ICD-10-CM

## 2023-11-15 DIAGNOSIS — Z34.93 PRENATAL CARE IN THIRD TRIMESTER: ICD-10-CM

## 2023-11-15 PROBLEM — Z3A.37 37 WEEKS GESTATION OF PREGNANCY: Status: ACTIVE | Noted: 2023-05-25

## 2023-11-15 PROBLEM — Z3A.37 37 WEEKS GESTATION OF PREGNANCY: Status: RESOLVED | Noted: 2023-11-05 | Resolved: 2023-11-15

## 2023-11-15 PROCEDURE — 99213 OFFICE O/P EST LOW 20 MIN: CPT | Performed by: NURSE PRACTITIONER

## 2023-11-15 NOTE — PROGRESS NOTES
Evaristo Jc presents today for routine OB visit at 38w2d. She is a [id number] with a history of three full term SVDs. Blood Pressure: 104/77  Wt=63.8 kg (140 lb 9.6 oz); Body mass index is 24.91 kg/m².; TWG=10.7 kg (23 lb 9.6 oz)  Fetal Heart Rate: 145; Fundal Height (cm): 38 cm  SVE today: declines   Abdomen: gravid, soft, non-tender. She reports irregular contractions. Denies regular uterine contractions. Denies vaginal bleeding or leaking of fluid. Reports adequate fetal movement of at least 10 movements in 2 hours once daily. Reviewed labor precautions and fetal kick counts as well as pre-eclampsia warning signs. Reviewed perineal massage for decreasing risk of perineal lacerations during delivery. Advised to continue medications and return in 1 week.       Current Outpatient Medications   Medication Instructions    BIOTIN PO Oral, 2 tabs PO     Blood Glucose Monitoring Suppl (FreeStyle Lite) XUAN Does not apply, Before Breakfast and 2 hours after meals    Calcium Ascorbate (VITAMIN C) 500 mg, Oral, Daily    CALCIUM PO Oral, 2 Tabs Po    glucose blood (FREESTYLE LITE) test strip Use as instructed    Lancets (freestyle) lancets Use as instructed    Prenatal Vit-Iron Carbonyl-FA (prenatal multivitamin) TABS 1 tablet, Oral, Daily    VITAMIN D PO Oral         Pregnancy Problems (from 04/20/23 to present)       Problem Noted Resolved    History of vacuum extraction assisted delivery 9/8/2023 by Ivan Shepard MD No    Request for sterilization 8/7/2023 by Beth Allen MD No    Overview Addendum 9/8/2023  9:35 PM by Ivan Shepard MD     Counseling done 8/7/23   MA-31 signed at 28w visit 9/8/23         38 weeks gestation of pregnancy 5/25/2023 by Mago Clemente MD No    Overview Addendum 11/15/2023 11:52 AM by LUIS FERNANDO Arteaga       Overview:  Labs  Pap smear NILM 2019; GC/CT negative  Prenatal panel normal  Blood type A+, rhogam not indicated   28w labs normal   36 week cultures: GBS negative   Vaccines:  Flu vaccine: Declined   Tdap vaccine: given   Genetic screening: declined   Contraception: desires permanent sterilization. MA31 signed 23. Feeding plan: breast   Birth plan:  @ [facility].  Undecided epidural.   Delivery consent: signed   Ultrasounds:   Normal Level II   Growth scan @ 28 weeks normal, EFW 30%             Multigravida of advanced maternal age in third trimester 2023 by Mago Clemente MD No

## 2023-11-20 ENCOUNTER — ROUTINE PRENATAL (OUTPATIENT)
Dept: OBGYN CLINIC | Facility: CLINIC | Age: 33
End: 2023-11-20

## 2023-11-20 VITALS
WEIGHT: 142 LBS | BODY MASS INDEX: 25.16 KG/M2 | HEIGHT: 63 IN | SYSTOLIC BLOOD PRESSURE: 113 MMHG | HEART RATE: 103 BPM | DIASTOLIC BLOOD PRESSURE: 70 MMHG

## 2023-11-20 DIAGNOSIS — Z30.2 REQUEST FOR STERILIZATION: ICD-10-CM

## 2023-11-20 DIAGNOSIS — Z3A.39 39 WEEKS GESTATION OF PREGNANCY: Primary | ICD-10-CM

## 2023-11-20 PROCEDURE — 99213 OFFICE O/P EST LOW 20 MIN: CPT | Performed by: OBSTETRICS & GYNECOLOGY

## 2023-11-20 NOTE — ASSESSMENT & PLAN NOTE
SVE 3/50/-2, membranes stripped  Will return to office in 1 week for repeat exam, will schedule induction at that time if not delivered

## 2023-11-20 NOTE — PROGRESS NOTES
[facility]  [id number]  1990        A/P:  Problem List       39 weeks gestation of pregnancy    Overview       Overview:  Labs  Pap smear NILM 2019; GC/CT negative  Prenatal panel normal  Blood type A+, rhogam not indicated   28w labs normal   36 week cultures: GBS negative   Vaccines:  Flu vaccine: Declined   Tdap vaccine: given   Genetic screening: declined   Contraception: desires permanent sterilization. CARMEN signed 23. Feeding plan: breast   Birth plan:  @ [facility]. Undecided epidural.   Delivery consent: signed   Ultrasounds:   Normal Level II   Growth scan @ 28 weeks normal, EFW 30%         Current Assessment & Plan     SVE 3/50/-2, membranes stripped  Will return to office in 1 week for repeat exam, will schedule induction at that time if not delivered         Multigravida of advanced maternal age in third trimester    Request for sterilization    Overview     Counseling done 23   AMOR signed at 28w visit 23         History of vacuum extraction assisted delivery           S: 26 y.o. [id number] 39w0d here for PN visit. She reports occasional strong contractions that are not regular. She denies leakage of fluid and vaginal bleeding. She has felt good fetal movement. O:  Vitals:    23 1000   BP: 113/70   Pulse: 103     Physical Exam  GEN: The patient was alert and oriented x3, pleasant well-appearing female in no acute distress.    CV: Regular rate  PULM: Non-labored respirations  MSK: Normal gait  Skin: Warm, dry  Neuro: No focal deficits  Psych: Normal affect and judgement, cooperative    Fetal Heart Rate: 142   SVE 3/50/-2    D/w Dr. Kavita Patel MD  OB/GYN PGY-3  2023  10:46 AM

## 2023-11-25 ENCOUNTER — HOSPITAL ENCOUNTER (INPATIENT)
Facility: HOSPITAL | Age: 33
LOS: 1 days | Discharge: HOME | DRG: 560 | End: 2023-11-27
Attending: OBSTETRICS & GYNECOLOGY | Admitting: STUDENT IN AN ORGANIZED HEALTH CARE EDUCATION/TRAINING PROGRAM
Payer: COMMERCIAL

## 2023-11-25 DIAGNOSIS — Z37.9 NORMAL LABOR: Primary | ICD-10-CM

## 2023-11-26 ENCOUNTER — ANESTHESIA (OUTPATIENT)
Dept: ANESTHESIOLOGY | Facility: HOSPITAL | Age: 33
DRG: 560 | End: 2023-11-26
Payer: COMMERCIAL

## 2023-11-26 ENCOUNTER — ANESTHESIA EVENT (OUTPATIENT)
Dept: ANESTHESIOLOGY | Facility: HOSPITAL | Age: 33
DRG: 560 | End: 2023-11-26
Payer: COMMERCIAL

## 2023-11-26 PROBLEM — Z37.9 NORMAL LABOR: Status: ACTIVE | Noted: 2023-11-26

## 2023-11-26 LAB
ABO GROUP BLD: NORMAL
BASE EXCESS BLDCOA CALC-SCNC: -6.2 MMOL/L (ref 3–11)
BASE EXCESS BLDCOV CALC-SCNC: -5.8 MMOL/L (ref 1–9)
BASOPHILS # BLD AUTO: 0.03 THOUSANDS/ÂΜL (ref 0–0.1)
BASOPHILS NFR BLD AUTO: 0 % (ref 0–1)
BLD GP AB SCN SERPL QL: NEGATIVE
EOSINOPHIL # BLD AUTO: 0.13 THOUSAND/ÂΜL (ref 0–0.61)
EOSINOPHIL NFR BLD AUTO: 1 % (ref 0–6)
ERYTHROCYTE [DISTWIDTH] IN BLOOD BY AUTOMATED COUNT: 14.5 % (ref 11.6–15.1)
HCO3 BLDCOA-SCNC: 23.5 MMOL/L (ref 17.3–27.3)
HCO3 BLDCOV-SCNC: 19.1 MMOL/L (ref 12.2–28.6)
HCT VFR BLD AUTO: 34.6 % (ref 34.8–46.1)
HGB BLD-MCNC: 11.2 G/DL (ref 11.5–15.4)
HOLD SPECIMEN: NORMAL
IMM GRANULOCYTES # BLD AUTO: 0.04 THOUSAND/UL (ref 0–0.2)
IMM GRANULOCYTES NFR BLD AUTO: 0 % (ref 0–2)
LYMPHOCYTES # BLD AUTO: 1.78 THOUSANDS/ÂΜL (ref 0.6–4.47)
LYMPHOCYTES NFR BLD AUTO: 19 % (ref 14–44)
MCH RBC QN AUTO: 27.9 PG (ref 26.8–34.3)
MCHC RBC AUTO-ENTMCNC: 32.4 G/DL (ref 31.4–37.4)
MCV RBC AUTO: 86 FL (ref 82–98)
MONOCYTES # BLD AUTO: 0.73 THOUSAND/ÂΜL (ref 0.17–1.22)
MONOCYTES NFR BLD AUTO: 8 % (ref 4–12)
NEUTROPHILS # BLD AUTO: 6.9 THOUSANDS/ÂΜL (ref 1.85–7.62)
NEUTS SEG NFR BLD AUTO: 72 % (ref 43–75)
NRBC BLD AUTO-RTO: 0 /100 WBCS
O2 CT VFR BLDCOA CALC: 9.7 ML/DL
OXYHGB MFR BLDCOA: 37.7 %
OXYHGB MFR BLDCOV: 70.7 %
PCO2 BLDCOA: 62.7 MM[HG] (ref 30–60)
PCO2 BLDCOV: 36.5 MM HG (ref 27–43)
PH BLDCOA: 7.19 [PH] (ref 7.23–7.43)
PH BLDCOV: 7.34 [PH] (ref 7.19–7.49)
PLATELET # BLD AUTO: 259 THOUSANDS/UL (ref 149–390)
PMV BLD AUTO: 10.5 FL (ref 8.9–12.7)
PO2 BLDCOA: 20 MM HG (ref 5–25)
PO2 BLDCOV: 29.4 MM HG (ref 15–45)
RBC # BLD AUTO: 4.02 MILLION/UL (ref 3.81–5.12)
RH BLD: POSITIVE
SAO2 % BLDCOV: 18.8 ML/DL
SPECIMEN EXPIRATION DATE: NORMAL
WBC # BLD AUTO: 9.61 THOUSAND/UL (ref 4.31–10.16)

## 2023-11-26 PROCEDURE — 4A1HXCZ MONITORING OF PRODUCTS OF CONCEPTION, CARDIAC RATE, EXTERNAL APPROACH: ICD-10-PCS | Performed by: OBSTETRICS & GYNECOLOGY

## 2023-11-26 PROCEDURE — 86900 BLOOD TYPING SEROLOGIC ABO: CPT

## 2023-11-26 PROCEDURE — 86901 BLOOD TYPING SEROLOGIC RH(D): CPT

## 2023-11-26 PROCEDURE — 99213 OFFICE O/P EST LOW 20 MIN: CPT

## 2023-11-26 PROCEDURE — 0UQJXZZ REPAIR CLITORIS, EXTERNAL APPROACH: ICD-10-PCS | Performed by: FAMILY MEDICINE

## 2023-11-26 PROCEDURE — 85025 COMPLETE CBC W/AUTO DIFF WBC: CPT

## 2023-11-26 PROCEDURE — 86780 TREPONEMA PALLIDUM: CPT

## 2023-11-26 PROCEDURE — 82805 BLOOD GASES W/O2 SATURATION: CPT | Performed by: OBSTETRICS & GYNECOLOGY

## 2023-11-26 PROCEDURE — 86850 RBC ANTIBODY SCREEN: CPT

## 2023-11-26 RX ORDER — DIAPER,BRIEF,INFANT-TODD,DISP
1 EACH MISCELLANEOUS DAILY PRN
Status: DISCONTINUED | OUTPATIENT
Start: 2023-11-26 | End: 2023-11-27 | Stop reason: HOSPADM

## 2023-11-26 RX ORDER — ACETAMINOPHEN 325 MG/1
650 TABLET ORAL EVERY 4 HOURS PRN
Status: DISCONTINUED | OUTPATIENT
Start: 2023-11-26 | End: 2023-11-27 | Stop reason: HOSPADM

## 2023-11-26 RX ORDER — DIPHENHYDRAMINE HCL 25 MG
25 TABLET ORAL EVERY 6 HOURS PRN
Status: DISCONTINUED | OUTPATIENT
Start: 2023-11-26 | End: 2023-11-27 | Stop reason: HOSPADM

## 2023-11-26 RX ORDER — ROPIVACAINE HYDROCHLORIDE 2 MG/ML
INJECTION, SOLUTION EPIDURAL; INFILTRATION; PERINEURAL AS NEEDED
Status: DISCONTINUED | OUTPATIENT
Start: 2023-11-26 | End: 2023-11-28 | Stop reason: HOSPADM

## 2023-11-26 RX ORDER — SENNOSIDES 8.6 MG
1 TABLET ORAL DAILY
Status: DISCONTINUED | OUTPATIENT
Start: 2023-11-26 | End: 2023-11-27 | Stop reason: HOSPADM

## 2023-11-26 RX ORDER — CALCIUM CARBONATE 500 MG/1
1000 TABLET, CHEWABLE ORAL DAILY PRN
Status: DISCONTINUED | OUTPATIENT
Start: 2023-11-26 | End: 2023-11-27 | Stop reason: HOSPADM

## 2023-11-26 RX ORDER — ONDANSETRON 2 MG/ML
4 INJECTION INTRAMUSCULAR; INTRAVENOUS EVERY 8 HOURS PRN
Status: DISCONTINUED | OUTPATIENT
Start: 2023-11-26 | End: 2023-11-27 | Stop reason: HOSPADM

## 2023-11-26 RX ORDER — IBUPROFEN 600 MG/1
600 TABLET ORAL EVERY 6 HOURS
Status: DISCONTINUED | OUTPATIENT
Start: 2023-11-26 | End: 2023-11-27 | Stop reason: HOSPADM

## 2023-11-26 RX ORDER — SIMETHICONE 80 MG
80 TABLET,CHEWABLE ORAL 4 TIMES DAILY PRN
Status: DISCONTINUED | OUTPATIENT
Start: 2023-11-26 | End: 2023-11-27 | Stop reason: HOSPADM

## 2023-11-26 RX ORDER — SODIUM CHLORIDE, SODIUM LACTATE, POTASSIUM CHLORIDE, CALCIUM CHLORIDE 600; 310; 30; 20 MG/100ML; MG/100ML; MG/100ML; MG/100ML
125 INJECTION, SOLUTION INTRAVENOUS CONTINUOUS
Status: DISCONTINUED | OUTPATIENT
Start: 2023-11-26 | End: 2023-11-26

## 2023-11-26 RX ORDER — OXYTOCIN/RINGER'S LACTATE 30/500 ML
PLASTIC BAG, INJECTION (ML) INTRAVENOUS
Status: COMPLETED
Start: 2023-11-26 | End: 2023-11-26

## 2023-11-26 RX ORDER — BUPIVACAINE HYDROCHLORIDE 2.5 MG/ML
30 INJECTION, SOLUTION EPIDURAL; INFILTRATION; INTRACAUDAL ONCE AS NEEDED
Status: DISCONTINUED | OUTPATIENT
Start: 2023-11-26 | End: 2023-11-26

## 2023-11-26 RX ORDER — LIDOCAINE HYDROCHLORIDE AND EPINEPHRINE 15; 5 MG/ML; UG/ML
INJECTION, SOLUTION EPIDURAL
Status: COMPLETED | OUTPATIENT
Start: 2023-11-26 | End: 2023-11-26

## 2023-11-26 RX ORDER — ONDANSETRON 2 MG/ML
4 INJECTION INTRAMUSCULAR; INTRAVENOUS EVERY 6 HOURS PRN
Status: DISCONTINUED | OUTPATIENT
Start: 2023-11-26 | End: 2023-11-26

## 2023-11-26 RX ORDER — TRANEXAMIC ACID 10 MG/ML
1000 INJECTION, SOLUTION INTRAVENOUS ONCE
Status: COMPLETED | OUTPATIENT
Start: 2023-11-26 | End: 2023-11-26

## 2023-11-26 RX ADMIN — ROPIVACAINE HYDROCHLORIDE 4 MG: 2 INJECTION EPIDURAL; INFILTRATION; PERINEURAL at 01:27

## 2023-11-26 RX ADMIN — SODIUM CHLORIDE, SODIUM LACTATE, POTASSIUM CHLORIDE, AND CALCIUM CHLORIDE 125 ML/HR: .6; .31; .03; .02 INJECTION, SOLUTION INTRAVENOUS at 03:50

## 2023-11-26 RX ADMIN — TRANEXAMIC ACID 1000 MG: 10 INJECTION, SOLUTION INTRAVENOUS at 06:41

## 2023-11-26 RX ADMIN — Medication 30 UNITS: at 06:23

## 2023-11-26 RX ADMIN — LIDOCAINE HYDROCHLORIDE AND EPINEPHRINE 3 ML: 15; 5 INJECTION, SOLUTION EPIDURAL at 01:20

## 2023-11-26 RX ADMIN — SODIUM CHLORIDE, SODIUM LACTATE, POTASSIUM CHLORIDE, AND CALCIUM CHLORIDE 999 ML/HR: .6; .31; .03; .02 INJECTION, SOLUTION INTRAVENOUS at 00:25

## 2023-11-26 RX ADMIN — BENZOCAINE AND LEVOMENTHOL 1 APPLICATION: 200; 5 SPRAY TOPICAL at 08:38

## 2023-11-26 RX ADMIN — IBUPROFEN 600 MG: 600 TABLET, FILM COATED ORAL at 18:43

## 2023-11-26 RX ADMIN — LIDOCAINE HYDROCHLORIDE AND EPINEPHRINE 2 ML: 15; 5 INJECTION, SOLUTION EPIDURAL at 01:23

## 2023-11-26 RX ADMIN — ACETAMINOPHEN 650 MG: 325 TABLET, FILM COATED ORAL at 21:08

## 2023-11-26 RX ADMIN — HYDROCORTISONE 1 APPLICATION: 1 CREAM TOPICAL at 08:38

## 2023-11-26 RX ADMIN — SODIUM CHLORIDE, SODIUM LACTATE, POTASSIUM CHLORIDE, AND CALCIUM CHLORIDE 999 ML/HR: .6; .31; .03; .02 INJECTION, SOLUTION INTRAVENOUS at 01:32

## 2023-11-26 RX ADMIN — ROPIVACAINE HYDROCHLORIDE: 2 INJECTION, SOLUTION EPIDURAL; INFILTRATION at 01:25

## 2023-11-26 RX ADMIN — ONDANSETRON 4 MG: 2 INJECTION INTRAMUSCULAR; INTRAVENOUS at 03:48

## 2023-11-26 NOTE — ASSESSMENT & PLAN NOTE
Admit to OB/GYN service  Follow up CBC, RPR, Blood Type  EFW: 30th%  VTX by transabdominal ultrasound   GBS neg  Analgesia and/or epidural at patient request  Anticipate

## 2023-11-26 NOTE — OB LABOR/OXYTOCIN SAFETY PROGRESS
Labor Progress Note - Wendi Rodriguezestine 26 y.o. female MRN: [id number]    Unit/Bed#: -01 Encounter: [id number]       Contraction Frequency (minutes): 3-4  Contraction Quality: Moderate  Tachysystole: No   Cervical Dilation: 6-7        Cervical Effacement: 90  Fetal Station: 0  Baseline Rate: 130 bpm  Fetal Heart Rate: 130 BPM  FHR Category: cat 1               Vital Signs:   Vitals:    11/26/23 0300   BP: 106/64   Pulse: 99   Resp: 16   Temp:    SpO2:        Notes/comments:     SVE as above.  Patient AROMed on cervical exam. Continue to monitor and anticipate vaginal delivery     Jesi Cheatham DO 11/26/2023 3:34 AM

## 2023-11-26 NOTE — H&P
H & P- Obstetrics   John Granda 26 y.o. female MRN: [id number]  Unit/Bed#: LD TRIAGE 4- Encounter: [id number]      Assessment/Plan:    Pedro Kendrick is a 26 y.o. [id number] at 39w6d admitted for labor    SVE: Cervical Dilation: 5  Cervical Effacement: 70  Cervical Consistency: Soft  Fetal Station: -1  Presentation: Vertex  Position: Unknown  Method: Manual  OB Examiner: Sosa    Normal labor  Assessment & Plan  Admit to OB/GYN service  Follow up CBC, RPR, Blood Type  EFW: 30th%  VTX by transabdominal ultrasound   GBS neg  Analgesia and/or epidural at patient request  Anticipate                  Patient of: [address] [facility]  This patient will be an INPATIENT  and I certify the anticipated length of stay is >2 Midnights  Discussed with Dr. Lorin Aviles:    Chief Complaint: painful contractions    HPI: John Granda is a 26 y.o. [id number] with an GLENNY of 2023, by Last Menstrual Period at 39w6d who is being admitted for labor . She complains of uterine contractions, occurring every 2 minutes, has no LOF, and reports no VB. She states she has felt good FM. This pregnancy is complicated by AMA. All other review of systems is negative. Pregnancy Plan:  Pregnancy: Gross     Delivery Plans  Planned delivery method: Vaginal  Planned delivery location: [facility] L&D  Acceptable blood products:  All     Post-Delivery Plans  Feeding intentions: Breast Milk      Patient Active Problem List   Diagnosis    39 weeks gestation of pregnancy    Multigravida of advanced maternal age in third trimester    Request for sterilization    History of vacuum extraction assisted delivery    Normal labor       OB History    Para Term  AB Living   6 3 3   2 3   SAB IAB Ectopic Multiple Live Births   2     0 3      # Outcome Date GA Lbr Oscar/2nd Weight Sex Delivery Anes PTL Lv   6 Current            5 Term 19 40w2d / 00:23 3430 g (7 lb 9 oz) M Vag-Spont EPI N JAYSON   4 2017           3 SAB 2016           2 Term 08/16/13 38w6d  3175 g (7 lb) F Vag-Spont  N JAYSON   1 Term 01/10/11 39w6d  3629 g (8 lb) M Vag-Spont  N JASYON       Past Medical History:   Diagnosis Date    HPV (human papilloma virus) infection 05/2010    ASCUS    Miscarriage     2015    Normal delivery 01/10/2011    Vacuum assist       No past surgical history on file. Social History     Tobacco Use    Smoking status: Never    Smokeless tobacco: Never   Substance Use Topics    Alcohol use: No       No Known Allergies    Medications Prior to Admission   Medication    BIOTIN PO    Calcium Ascorbate (VITAMIN C) 500 mg tablet    Prenatal Vit-Iron Carbonyl-FA (prenatal multivitamin) TABS    VITAMIN D PO    Blood Glucose Monitoring Suppl (FreeStyle Lite) XUAN    CALCIUM PO    glucose blood (FREESTYLE LITE) test strip    Lancets (freestyle) lancets           OBJECTIVE:  Vitals:  Temp:  [98 °F (36.7 °C)-98.3 °F (36.8 °C)] 98 °F (36.7 °C)  HR:  [] 72  Resp:  [20-22] 22  BP: (105-130)/(68) 105/68  Body mass index is 25.69 kg/m². Physical Exam:  General: Well appearing, no distress  Respiratory: Unlabored breathing  Cardiovascular: Regular rate. Abdomen: Soft, gravid, nontender  Fundal Height: Appropriate for gestational age. Extremities: Warm and well perfused. Non tender.   Psychiatric: Behavioral normal        FHT:   Cat 1 tracing    TOCO:    Contractions every 4-5 on toco      Prenatal Labs: Blood Type:   Lab Results   Component Value Date/Time    ABO Grouping A 05/19/2023 08:37 AM     , D (Rh type):   Lab Results   Component Value Date/Time    Rh Factor Positive 05/19/2023 08:37 AM     , Antibody Screen: No results found for: "ANTIBODYSCR" , HCT/HGB:   Lab Results   Component Value Date/Time    Hematocrit 36.4 05/19/2023 08:37 AM    Hemoglobin 12.6 05/19/2023 08:37 AM      , Platelets:   Lab Results   Component Value Date/Time    Platelets 160 98/64/8022 08:37 AM      , 1 hour Glucola:   Lab Results   Component Value Date/Time Glucose 106 08/09/2023 09:42 AM   , Varicella: No results found for: "VARICELLAIGG"    , Rubella:   Lab Results   Component Value Date/Time    Rubella IgG Quant 71.2 05/19/2023 08:37 AM        , VDRL/RPR:   Lab Results   Component Value Date/Time    RPR Non-Reactive 07/18/2019 09:48 PM      , Urine Culture/Screen:   Lab Results   Component Value Date/Time    Urine Culture (A) 05/19/2023 08:37 AM     10,000-19,000 cfu/ml Coagulase-negative Staphylococcus (Group)       , Hep B:   Lab Results   Component Value Date/Time    Hepatitis B Surface Ag Non-reactive 05/19/2023 08:37 AM     , Hep C: No components found for: "HEPCSAG", "EXTHEPCSAG"   , HIV:   Lab Results   Component Value Date/Time    HIV-1/HIV-2 Ab Non-Reactive 02/28/2019 11:23 AM     , Chlamydia: No results found for: "EXTCHLAMYDIA"  , Gonorrhea:   Lab Results   Component Value Date/Time    N gonorrhoeae, DNA Probe Negative 05/31/2023 02:22 PM    N gonorrhoeae, DNA Probe N. gonorrhoeae Amplified DNA Negative 09/29/2016 02:09 PM     , Group B Strep:    Lab Results   Component Value Date/Time    Strep Grp B PCR Negative 11/06/2023 11:04 AM    Strep Grp B PCR Negative for Beta Hemolytic Strep Grp B by PCR 07/11/2019 11:30 AM      ,       Sonal Matt, DO  11/26/2023  12:29 AM        Portions of the record may have been created with voice recognition software. Occasional wrong word or "sound a like" substitutions may have occurred due to the inherent limitations of voice recognition software.   Read the chart carefully and recognize, using context, where substitutions have occurred

## 2023-11-26 NOTE — PLAN OF CARE
Problem: Knowledge Deficit  Goal: Verbalizes understanding of labor plan  Description: Assess patient/family/caregiver's baseline knowledge level and ability to understand information. Provide education via patient/family/caregiver's preferred learning method at appropriate level of understanding. 1. Provide teaching at level of understanding. 2. Provide teaching via preferred learning method(s). Outcome: Progressing     Problem: Labor & Delivery  Goal: Manages discomfort  Description: Assess and monitor for signs and symptoms of discomfort. Assess patient's pain level regularly and per hospital policy. Administer medications as ordered. Support use of nonpharmacological methods to help control pain such as distraction, imagery, relaxation, and application of heat and cold. Collaborate with interdisciplinary team and patient to determine appropriate pain management plan. 1. Include patient in decisions related to comfort. 2. Offer non-pharmacological pain management interventions. 3. Report ineffective pain management to physician. Outcome: Progressing  Goal: Patient vital signs are stable  Description: 1. Assess vital signs - vaginal delivery.   Outcome: Progressing     Problem: BIRTH - VAGINAL/ SECTION  Goal: Fetal and maternal status remain reassuring during the birth process  Description: INTERVENTIONS:  - Monitor vital signs  - Monitor fetal heart rate  - Monitor uterine activity  - Monitor labor progression (vaginal delivery)  - DVT prophylaxis  - Antibiotic prophylaxis  Outcome: Progressing  Goal: Emotionally satisfying birthing experience for mother/fetus  Description: Interventions:  - Assess, plan, implement and evaluate the nursing care given to the patient in labor  - Advocate the philosophy that each childbirth experience is a unique experience and support the family's chosen level of involvement and control during the labor process   - Actively participate in both the patient's and family's teaching of the birth process  - Consider cultural, Holiness and age-specific factors and plan care for the patient in labor  Outcome: Progressing

## 2023-11-26 NOTE — ANESTHESIA POSTPROCEDURE EVALUATION
Post-Op Assessment Note          Post-op block assessment: catheter intact                  BP   103/65   Temp      Pulse  90   Resp   16   SpO2   98

## 2023-11-26 NOTE — ANESTHESIA PREPROCEDURE EVALUATION
Procedure:  LABOR ANALGESIA    Relevant Problems   GYN   (+) 39 weeks gestation of pregnancy   (+) Multigravida of advanced maternal age in third trimester        Physical Exam    Airway       Dental       Cardiovascular      Pulmonary      Other Findings  post-pubertal.      Anesthesia Plan  ASA Score- 2     Anesthesia Type- epidural with ASA Monitors. Additional Monitors:     Airway Plan:     Comment: Discussed the risks/benefits of neuraxial anesthesia, including risk of bleeding/infection/damage to underlying structures, the likely side effect of nausea, and unlikely complication of spinal headache. .       Plan Factors-Exercise tolerance (METS): >4 METS. Chart reviewed. Existing labs reviewed. Patient summary reviewed. Patient is not a current smoker. Patient instructed to abstain from smoking on day of procedure. Patient did not smoke on day of surgery. Induction-     Postoperative Plan-     Informed Consent- Anesthetic plan and risks discussed with patient. I personally reviewed this patient with the CRNA. Discussed and agreed on the Anesthesia Plan with the CRNA. Dariusz Rodas

## 2023-11-26 NOTE — PLAN OF CARE
Problem: Knowledge Deficit  Goal: Verbalizes understanding of labor plan  Description: Assess patient/family/caregiver's baseline knowledge level and ability to understand information. Provide education via patient/family/caregiver's preferred learning method at appropriate level of understanding. 1. Provide teaching at level of understanding. 2. Provide teaching via preferred learning method(s). Outcome: Progressing     Problem: Labor & Delivery  Goal: Manages discomfort  Description: Assess and monitor for signs and symptoms of discomfort. Assess patient's pain level regularly and per hospital policy. Administer medications as ordered. Support use of nonpharmacological methods to help control pain such as distraction, imagery, relaxation, and application of heat and cold. Collaborate with interdisciplinary team and patient to determine appropriate pain management plan. 1. Include patient in decisions related to comfort. 2. Offer non-pharmacological pain management interventions. 3. Report ineffective pain management to physician. Outcome: Progressing  Goal: Patient vital signs are stable  Description: 1. Assess vital signs - vaginal delivery.   Outcome: Progressing     Problem: BIRTH - VAGINAL/ SECTION  Goal: Fetal and maternal status remain reassuring during the birth process  Description: INTERVENTIONS:  - Monitor vital signs  - Monitor fetal heart rate  - Monitor uterine activity  - Monitor labor progression (vaginal delivery)  - DVT prophylaxis  - Antibiotic prophylaxis  Outcome: Progressing  Goal: Emotionally satisfying birthing experience for mother/fetus  Description: Interventions:  - Assess, plan, implement and evaluate the nursing care given to the patient in labor  - Advocate the philosophy that each childbirth experience is a unique experience and support the family's chosen level of involvement and control during the labor process   - Actively participate in both the patient's and family's teaching of the birth process  - Consider cultural, Synagogue and age-specific factors and plan care for the patient in labor  Outcome: Progressing     Problem: PAIN - ADULT  Goal: Verbalizes/displays adequate comfort level or baseline comfort level  Description: Interventions:  - Encourage patient to monitor pain and request assistance  - Assess pain using appropriate pain scale  - Administer analgesics based on type and severity of pain and evaluate response  - Implement non-pharmacological measures as appropriate and evaluate response  - Consider cultural and social influences on pain and pain management  - Notify physician/advanced practitioner if interventions unsuccessful or patient reports new pain  Outcome: Progressing     Problem: DISCHARGE PLANNING  Goal: Discharge to home or other facility with appropriate resources  Description: INTERVENTIONS:  - Identify barriers to discharge w/patient and caregiver  - Arrange for needed discharge resources and transportation as appropriate  - Identify discharge learning needs (meds, wound care, etc.)  - Arrange for interpretive services to assist at discharge as needed  - Refer to Case Management Department for coordinating discharge planning if the patient needs post-hospital services based on physician/advanced practitioner order or complex needs related to functional status, cognitive ability, or social support system  Outcome: Progressing

## 2023-11-26 NOTE — ANESTHESIA PROCEDURE NOTES
Epidural Block    Patient location during procedure: OB/L&D  Start time: 11/26/2023 1:20 AM  Reason for block: procedure for pain  Staffing  Performed by: Josiah Isidro MD  Authorized by: Josiah Isidro MD    Preanesthetic Checklist  Completed: patient identified, IV checked, site marked, risks and benefits discussed, surgical consent, monitors and equipment checked, pre-op evaluation and timeout performed  Epidural  Patient position: sitting  Prep: ChloraPrep  Sedation Level: no sedation  Patient monitoring: frequent blood pressure checks, continuous pulse oximetry and heart rate  Approach: midline  Location: lumbar, L3-4  Injection technique: MILLIE saline  Needle  Needle type: Tuohy   Needle gauge: 18 G  Needle insertion depth: 3.3 cm  Catheter type: multi-orifice  Catheter size: 20 G  Catheter at skin depth: 9.5 cm  Catheter securement method: stabilization device and clear occlusive dressing  Test dose: negativelidocaine-epinephrine (XYLOCAINE-MPF/EPINEPHRINE) 1.5 %-1:200,000 injection 3 mL - Epidural   3 mL - 11/26/2023 1:20:00 AM  Assessment  Sensory level: T10  Number of attempts: 1negative aspiration for CSF, negative aspiration for heme and no paresthesia on injection  patient tolerated the procedure well with no immediate complications  Additional Notes  Unremarkable epidural

## 2023-11-26 NOTE — PLAN OF CARE
Problem: Knowledge Deficit  Goal: Verbalizes understanding of labor plan  Description: Assess patient/family/caregiver's baseline knowledge level and ability to understand information. Provide education via patient/family/caregiver's preferred learning method at appropriate level of understanding. 1. Provide teaching at level of understanding. 2. Provide teaching via preferred learning method(s). Outcome: Progressing     Problem: Labor & Delivery  Goal: Manages discomfort  Description: Assess and monitor for signs and symptoms of discomfort. Assess patient's pain level regularly and per hospital policy. Administer medications as ordered. Support use of nonpharmacological methods to help control pain such as distraction, imagery, relaxation, and application of heat and cold. Collaborate with interdisciplinary team and patient to determine appropriate pain management plan. 1. Include patient in decisions related to comfort. 2. Offer non-pharmacological pain management interventions. 3. Report ineffective pain management to physician. Outcome: Progressing  Goal: Patient vital signs are stable  Description: 1. Assess vital signs - vaginal delivery.   Outcome: Progressing     Problem: BIRTH - VAGINAL/ SECTION  Goal: Fetal and maternal status remain reassuring during the birth process  Description: INTERVENTIONS:  - Monitor vital signs  - Monitor fetal heart rate  - Monitor uterine activity  - Monitor labor progression (vaginal delivery)  - DVT prophylaxis  - Antibiotic prophylaxis  Outcome: Progressing  Goal: Emotionally satisfying birthing experience for mother/fetus  Description: Interventions:  - Assess, plan, implement and evaluate the nursing care given to the patient in labor  - Advocate the philosophy that each childbirth experience is a unique experience and support the family's chosen level of involvement and control during the labor process   - Actively participate in both the patient's and family's teaching of the birth process  - Consider cultural, Muslim and age-specific factors and plan care for the patient in labor  Outcome: Progressing

## 2023-11-26 NOTE — DISCHARGE SUMMARY
Discharge Summary - Bin Wilde 26 y.o. female MRN: [id number]    Unit/Bed#: -01 Encounter: [id number]    Admission Date: 2023     Discharge Date: 23    Patient Active Problem List   Diagnosis     (spontaneous vaginal delivery)    Multigravida of advanced maternal age in third trimester    Request for sterilization    History of vacuum extraction assisted delivery    Normal labor       OBGYN Practice: [facility] Course:   Bin Wilde is a 26 y.o. [id number] 39w6d . She presented to labor and delivery for contractions and was noted to be 5/70/-1 on admission. She was admitted and received an epidural for pain control. Amniotomy was performed for clear fluid. Pregnancy was otherwise uncomplicated. She was augmented with Pitocin during pushing to achieve contractions that were closer together and stronger. Delivery Findings:  Julio delivered a viable male  on 2023  6:28 AM  via Vaginal, Spontaneous  . The delivery was uncomplicated, but given her history of postpartum bleeding w/o hemorrhage and multiparity, she was given prophylactic TXA. Baby's Weight: 3595 g (7 lb 14.8 oz) ; 126.81     Apgar scores: 9  and 9  at 1 and 5 minutes, respectively  Anesthesia: Epidural ,   QBL: Non-Surgical QBL (mL): 263         was transferred to  nursery. Patient tolerated the procedure well and was transferred to recovery in stable condition. Her post-partum course was uncomplicated. Her post-partum pain was well controlled with oral analgesics. On day of discharge she was ambulating, voiding spontaneously, tolerating oral intake and hemodynamically stable. Mom's blood type is A positive and  Rhogam was not given. She was discharged home on postpartum day #1 without complications.  Patient was instructed to follow up with her OB as an outpatient and was given appropriate warnings to call doctor or provider if she develops signs of infection or uncontrolled pain. Discharge Problem List by Issue:   *  (spontaneous vaginal delivery)  Assessment & Plan  Lochia WNL   Recovering well   Appropriate bowel and bladder function   Pain well controlled   Tolerating diet   Breastfeeding   Ambulating without issues   No lower extremity tenderness  GBS neg   Rh pos       Disposition: Home    Planned Readmission: No    Discharge Medications:   Please see AVS    Discharge instructions :   -Do not place anything (no partner, tampons or douche) in your vagina for 6 weeks. -You may walk for exercise for the first 6 weeks then gradually return to your usual activities.   -Please do not drive for 1 week if you have no stitches and for 2 weeks if you have stitches or underwent a  delivery.    -You may take baths or shower per your preference.   -Please look at your bust (breasts) in the mirror daily and call your doctor for redness or tenderness or increased warmth. - If you have had a  section please look at your incision daily as well and call provider for increasing redness or steady drainage from the incision.   -Please call your doctor's office if temperature > 100.4*F or 38* C, worsening pain or a foul discharge.     Follow Up:  - Follow up in 4 weeks for postpartum visit    Beryle Chars, MD  OBGYN PGY-3  23 6:34 AM

## 2023-11-27 VITALS
SYSTOLIC BLOOD PRESSURE: 108 MMHG | TEMPERATURE: 97.6 F | HEART RATE: 85 BPM | WEIGHT: 145 LBS | DIASTOLIC BLOOD PRESSURE: 65 MMHG | HEIGHT: 63 IN | OXYGEN SATURATION: 98 % | RESPIRATION RATE: 16 BRPM | BODY MASS INDEX: 25.69 KG/M2

## 2023-11-27 LAB — TREPONEMA PALLIDUM IGG+IGM AB [PRESENCE] IN SERUM OR PLASMA BY IMMUNOASSAY: NORMAL

## 2023-11-27 PROCEDURE — NC001 PR NO CHARGE: Performed by: OBSTETRICS & GYNECOLOGY

## 2023-11-27 PROCEDURE — 99024 POSTOP FOLLOW-UP VISIT: CPT | Performed by: OBSTETRICS & GYNECOLOGY

## 2023-11-27 RX ORDER — ACETAMINOPHEN 325 MG/1
650 TABLET ORAL EVERY 4 HOURS PRN
Refills: 0
Start: 2023-11-27

## 2023-11-27 RX ORDER — IBUPROFEN 600 MG/1
600 TABLET ORAL EVERY 6 HOURS
Qty: 30 TABLET | Refills: 0 | Status: SHIPPED | OUTPATIENT
Start: 2023-11-27 | End: 2023-12-27

## 2023-11-27 RX ORDER — DIAPER,BRIEF,INFANT-TODD,DISP
1 EACH MISCELLANEOUS DAILY PRN
Refills: 0
Start: 2023-11-27

## 2023-11-27 NOTE — UTILIZATION REVIEW
Mother and baby discharged on 2023     NOTIFICATION OF INPATIENT ADMISSION   MATERNITY/DELIVERY AUTHORIZATION REQUEST   SERVICING FACILITY:   [facility], [address] [facility] [place] [address]  Tax ID: [id number]  NPI: [id number]   ATTENDING PROVIDER:  Attending Name and NPI#: Elizabeth Gee, [id number] [place] [address] [[id number]]  Address: [address] [place] [address]  Phone: [phone]   ADMISSION INFORMATION:  Place of Service: Inpatient [address]  Place of Service Code: 21  Inpatient Admission Date/Time: 23 12:40 AM  Discharge Date/Time: 2023 10:10 AM  Admitting Diagnosis Code/Description:  Uterine contractions during pregnancy [O47.9]  38 weeks gestation of pregnancy [Z3A.38]  Encounter for full-term uncomplicated delivery [J65]     Mother: Evaristo Jc 1990 Estimated Date of Delivery: 23  Delivering clinician: Anitra Melvin    OB History          6    Para   4    Term   4            AB   2    Living   4         SAB   2    IAB        Ectopic        Multiple   0    Live Births   4                Name & MRN:   Information for the patient's :  Marlene Guillory [[id number]]    Delivery Information:  Sex: male  Delivered 2023 6:28 AM by Vaginal, Spontaneous; Gestational Age: 36w10d     Measurements:  Weight: 7 lb 14.8 oz (3595 g); Height: 20"    APGAR 1 minute 5 minutes 10 minutes   Totals: 9 9      [facility] Birth Information: 26 y.o. female MRN: [id number] Unit/Bed#: -01   Birthweight: No birth weight on file. Gestational Age: <None> Delivery Type:    APGARS Totals:        UTILIZATION REVIEW CONTACT:  Hanane Brandt, Utilization   Network Utilization Review Department  Phone: [phone]  Fax [phone]  Email: [email or web address]  Contact for approvals/pending authorizations, clinical reviews, and discharge.      PHYSICIAN ADVISORY SERVICES:  Medical Necessity Denial & Xfxh-ny-Hrrx Review  Phone: [phone]  Fax: [phone]  Email: [email or web address]     DISCHARGE SUPPORT TEAM:  For Patients Discharge Needs & Updates  Phone: [phone] opt. 2 Fax: [phone]  Email: [email or web address]

## 2023-11-27 NOTE — PROGRESS NOTES
Progress Note - OB/GYN  Adilene John 26 y.o. female MRN: [id number]  Unit/Bed#: -01 Encounter: [id number]    Assessment and Plan     Adilene John is a patient of: [address] [place] [address]. She is PPD# 1 s/p  spontaneous vaginal delivery  Recovering well and is stable       *  (spontaneous vaginal delivery)  Assessment & Plan  Lochia WNL   Recovering well   Appropriate bowel and bladder function   Pain well controlled   Tolerating diet   Breastfeeding   Ambulating without issues   No lower extremity tenderness  GBS neg   Rh pos       Disposition    - Anticipate discharge home today      Subjective/Objective     Chief Complaint: Postpartum State     Subjective:    Adilene John is PPD#1 s/p  spontaneous vaginal delivery. She has no current complaints. Pain is well controlled. Patient is currently voiding. She is ambulating. Patient is currently passing flatus and has had no bowel movement. She is tolerating PO, and denies nausea or vomitting. Patient denies fever, chills, chest pain, shortness of breath, or calf tenderness. Lochia is normal. She is  Breastfeeding. She is recovering well and is stable.        Vitals:   BP 97/54 (BP Location: Left arm)   Pulse 81   Temp 97.7 °F (36.5 °C) (Oral)   Resp 20   Ht 5' 3" (1.6 m)   Wt 65.8 kg (145 lb)   LMP 2023   SpO2 98%   Breastfeeding Yes   BMI 25.69 kg/m²       Intake/Output Summary (Last 24 hours) at 2023 4416  Last data filed at 2023 0900  Gross per 24 hour   Intake 54.8 ml   Output 963 ml   Net -908.2 ml       Invasive Devices       None                   Physical Exam:   GEN: Adilene John appears well, alert, pleasant and cooperative   CARDIO: RRR  RESP:  Normal respiratory effort  ABDOMEN: Soft, no tenderness, no distention, fundus firm  EXTREMITIES: Non tender, no erythema, b/l Bonita's sign negative      Labs:     Hemoglobin   Date Value Ref Range Status   2023 11.2 (L) 11.5 - 15.4 g/dL Final 05/19/2023 12.6 11.5 - 15.4 g/dL Final     WBC   Date Value Ref Range Status   11/26/2023 9.61 4.31 - 10.16 Thousand/uL Final   05/19/2023 6.50 4.31 - 10.16 Thousand/uL Final     Platelets   Date Value Ref Range Status   11/26/2023 259 149 - 390 Thousands/uL Final   05/19/2023 274 149 - 390 Thousands/uL Final     Creatinine   Date Value Ref Range Status   06/11/2020 0.53 (L) 0.60 - 1.30 mg/dL Final     Comment:     Standardized to IDMS reference method   08/19/2019 0.64 0.60 - 1.30 mg/dL Final     Comment:     Standardized to IDMS reference method     AST   Date Value Ref Range Status   06/11/2020 7 5 - 45 U/L Final     Comment:       Specimen collection should occur prior to Sulfasalazine administration due to the potential for falsely depressed results. 08/19/2019 12 5 - 45 U/L Final     Comment:       Specimen collection should occur prior to Sulfasalazine administration due to the potential for falsely depressed results. ALT   Date Value Ref Range Status   06/11/2020 12 12 - 78 U/L Final     Comment:       Specimen collection should occur prior to Sulfasalazine and/or Sulfapyridine administration due to the potential for falsely depressed results. 08/19/2019 22 12 - 78 U/L Final     Comment:       Specimen collection should occur prior to Sulfasalazine and/or Sulfapyridine administration due to the potential for falsely depressed results.            Katlin Bagley MD  OBGYN PGY2  11/27/2023  6:13 AM

## 2023-11-27 NOTE — PLAN OF CARE
Problem: Knowledge Deficit  Goal: Verbalizes understanding of labor plan  Description: Assess patient/family/caregiver's baseline knowledge level and ability to understand information. Provide education via patient/family/caregiver's preferred learning method at appropriate level of understanding. 1. Provide teaching at level of understanding. 2. Provide teaching via preferred learning method(s). Outcome: Completed     Problem: Labor & Delivery  Goal: Manages discomfort  Description: Assess and monitor for signs and symptoms of discomfort. Assess patient's pain level regularly and per hospital policy. Administer medications as ordered. Support use of nonpharmacological methods to help control pain such as distraction, imagery, relaxation, and application of heat and cold. Collaborate with interdisciplinary team and patient to determine appropriate pain management plan. 1. Include patient in decisions related to comfort. 2. Offer non-pharmacological pain management interventions. 3. Report ineffective pain management to physician. Outcome: Completed  Goal: Patient vital signs are stable  Description: 1. Assess vital signs - vaginal delivery.   Outcome: Completed     Problem: BIRTH - VAGINAL/ SECTION  Goal: Fetal and maternal status remain reassuring during the birth process  Description: INTERVENTIONS:  - Monitor vital signs  - Monitor fetal heart rate  - Monitor uterine activity  - Monitor labor progression (vaginal delivery)  - DVT prophylaxis  - Antibiotic prophylaxis  Outcome: Completed  Goal: Emotionally satisfying birthing experience for mother/fetus  Description: Interventions:  - Assess, plan, implement and evaluate the nursing care given to the patient in labor  - Advocate the philosophy that each childbirth experience is a unique experience and support the family's chosen level of involvement and control during the labor process   - Actively participate in both the patient's and family's teaching of the birth process  - Consider cultural, Denominational and age-specific factors and plan care for the patient in labor  Outcome: Completed     Problem: PAIN - ADULT  Goal: Verbalizes/displays adequate comfort level or baseline comfort level  Description: Interventions:  - Encourage patient to monitor pain and request assistance  - Assess pain using appropriate pain scale  - Administer analgesics based on type and severity of pain and evaluate response  - Implement non-pharmacological measures as appropriate and evaluate response  - Consider cultural and social influences on pain and pain management  - Notify physician/advanced practitioner if interventions unsuccessful or patient reports new pain  Outcome: Completed     Problem: DISCHARGE PLANNING  Goal: Discharge to home or other facility with appropriate resources  Description: INTERVENTIONS:  - Identify barriers to discharge w/patient and caregiver  - Arrange for needed discharge resources and transportation as appropriate  - Identify discharge learning needs (meds, wound care, etc.)  - Arrange for interpretive services to assist at discharge as needed  - Refer to Case Management Department for coordinating discharge planning if the patient needs post-hospital services based on physician/advanced practitioner order or complex needs related to functional status, cognitive ability, or social support system  Outcome: Completed

## 2023-11-27 NOTE — LACTATION NOTE
This note was copied from a baby's chart. CONSULT - LACTATION  Baby Boy Michelle Waters 1 days male MRN: [id number]    [facility] NURSERY Room / Bed: (N)/(N) Encounter: [id number]    Maternal Information     MOTHER:  Wendi Tineo  Maternal Age: 26 y.o.   OB History: # 1 - Date: 01[birth year]10/11, Sex: Male, Weight: 3629 g (8 lb), GA: 39w6d, Delivery: Vaginal, Spontaneous, Apgar1: None, Apgar5: None, Living: Living, Birth Comments: None    # 2 - Date: 13, Sex: Female, Weight: 3175 g (7 lb), GA: 38w6d, Delivery: Vaginal, Spontaneous, Apgar1: None, Apgar5: None, Living: Living, Birth Comments: None    # 3 - Date: , Sex: None, Weight: None, GA: None, Delivery: None, Apgar1: None, Apgar5: None, Living: None, Birth Comments: None    # 4 - Date: , Sex: None, Weight: None, GA: None, Delivery: None, Apgar1: None, Apgar5: None, Living: None, Birth Comments: None    # 5 - Date: 19, Sex: Male, Weight: 3430 g (7 lb 9 oz), GA: 40w2d, Delivery: Vaginal, Spontaneous, Apgar1: 8, Apgar5: 9, Living: Living, Birth Comments: None    # 6 - Date: 23, Sex: Male, Weight: 3595 g (7 lb 14.8 oz), GA: 39w6d, Delivery: Vaginal, Spontaneous, Apgar1: 9, Apgar5: 9, Living: Living, Birth Comments: None   Previouse breast reduction surgery? No    Lactation history:   Has patient previously breast fed: Yes   How long had patient previously breast fed:     Previous breast feeding complications: None   History reviewed. No pertinent surgical history.      Birth information:  YOB: 2025   Time of birth: 6:28 AM   Sex: male   Delivery type: Vaginal, Spontaneous   Birth Weight: 3595 g (7 lb 14.8 oz)   Percent of Weight Change: -2%     Gestational Age: 36w10d   [unfilled]    Assessment     Breast and nipple assessment:  no clinical assessment    [place] Assessment:  no clinical assessment    Feeding assessment:  feeding well as per mom  LATCH:  Latch: Repeated attempts, hold nipple in mouth, stimulate to suck   Audible Swallowing: A few with stimulation   Type of Nipple: Everted (After stimulation)   Comfort (Breast/Nipple): Soft/non-tender   Hold (Positioning): No assist from staff, mother able to position/hold infant   LATCH Score: 8          Feeding recommendations:  breast feed on demand. Mom is an experienced breastfeeding mom. Mom states she has breast fed all her children in the past and is planning to excl. Breastfeed this baby. Mom denies a pump. Provided [employer] pamphlet and ed. On having one year to receive a pump from ins. RSB/DC reviewed    Enc. To call lactation. Information on hand expression given. Discussed benefits of knowing how to manually express breast including stimulating milk supply, softening nipple for latch and evacuating breast in the event of engorgement. Mom is encouraged to place baby skin to skin for feedings. Skin to skin education provided for baby placement on mother's chest, baby only in diaper, blankets below shoulders on baby's back. Skin to skin is encouraged to continue at home for feedings and between feedings. Worked on positioning infant up at chest level and starting to feed infant with nose arriving at the nipple. Then, using areolar compression to achieve a deep latch that is comfortable and exchanges optimum amounts of milk. - Start feedings on breast that last feeding ended   - allow no more than 3 hours between breast feeding sessions   - time between feedings is counted from the beginning of the first feed to the beginning of the next feeding session    Reviewed early signs of hunger, including tensing of hands and shoulders - no need to wait for open eyes. Crying is a late hunger sign. If baby is crying, soothe baby first and then attempt to latch. Reviewed normal sucking patterns: transition from stimulation to nutritive to release or non-nutritive.  The goal is to see and hear lots of swallowing. Reviewed normal nursing pattern: infant could latch on one breast up to 30 minutes or until releases on own. Signs of satiation is open hand with fingers that do not grab your finger. Discussed difference in sensation of non-nutritive v nutritive sucking    Met with mother. Provided mother with Ready, Set, Baby booklet. Discussed Skin to Skin contact an benefits to mom and baby. Talked about the delay of the first bath until baby has adjusted. Spoke about the benefits of rooming in. Feeding on cue and what that means for recognizing infant's hunger. Avoidance of pacifiers for the first month discussed. Talked about exclusive breastfeeding for the first 6 months. Positioning and latch reviewed as well as showing images of other feeding positions. Discussed the properties of a good latch in any position. Reviewed hand/manual expression. Discussed s/s that baby is getting enough milk and some s/s that breastfeeding dyad may need further help. Gave information on common concerns, what to expect the first few weeks after delivery, preparing for other caregivers, and how partners can help. Resources for support also provided. Encouraged parents to call for assistance, questions, and concerns about breastfeeding. Extension provided. Provided education on growth spurts, when to introduce bottles; paced bottle feeding, and non-nutritive suck at the breast. Provided education on Signs of satiation. Encouraged to call lactation to observe a latch prior to discharge for reassurance. Encouraged to call baby and me with any questions and closely monitor output.       Caprice Green 11/27/2023 8:17 AM

## 2023-11-27 NOTE — PLAN OF CARE
Problem: Knowledge Deficit  Goal: Verbalizes understanding of labor plan  Description: Assess patient/family/caregiver's baseline knowledge level and ability to understand information. Provide education via patient/family/caregiver's preferred learning method at appropriate level of understanding. 1. Provide teaching at level of understanding. 2. Provide teaching via preferred learning method(s). Outcome: Progressing     Problem: Labor & Delivery  Goal: Manages discomfort  Description: Assess and monitor for signs and symptoms of discomfort. Assess patient's pain level regularly and per hospital policy. Administer medications as ordered. Support use of nonpharmacological methods to help control pain such as distraction, imagery, relaxation, and application of heat and cold. Collaborate with interdisciplinary team and patient to determine appropriate pain management plan. 1. Include patient in decisions related to comfort. 2. Offer non-pharmacological pain management interventions. 3. Report ineffective pain management to physician. Outcome: Progressing  Goal: Patient vital signs are stable  Description: 1. Assess vital signs - vaginal delivery.   Outcome: Progressing     Problem: BIRTH - VAGINAL/ SECTION  Goal: Fetal and maternal status remain reassuring during the birth process  Description: INTERVENTIONS:  - Monitor vital signs  - Monitor fetal heart rate  - Monitor uterine activity  - Monitor labor progression (vaginal delivery)  - DVT prophylaxis  - Antibiotic prophylaxis  Outcome: Progressing  Goal: Emotionally satisfying birthing experience for mother/fetus  Description: Interventions:  - Assess, plan, implement and evaluate the nursing care given to the patient in labor  - Advocate the philosophy that each childbirth experience is a unique experience and support the family's chosen level of involvement and control during the labor process   - Actively participate in both the patient's and family's teaching of the birth process  - Consider cultural, Oriental orthodox and age-specific factors and plan care for the patient in labor  Outcome: Progressing     Problem: PAIN - ADULT  Goal: Verbalizes/displays adequate comfort level or baseline comfort level  Description: Interventions:  - Encourage patient to monitor pain and request assistance  - Assess pain using appropriate pain scale  - Administer analgesics based on type and severity of pain and evaluate response  - Implement non-pharmacological measures as appropriate and evaluate response  - Consider cultural and social influences on pain and pain management  - Notify physician/advanced practitioner if interventions unsuccessful or patient reports new pain  Outcome: Progressing     Problem: DISCHARGE PLANNING  Goal: Discharge to home or other facility with appropriate resources  Description: INTERVENTIONS:  - Identify barriers to discharge w/patient and caregiver  - Arrange for needed discharge resources and transportation aappropriate  - Identify discharge learning needs (meds, wound care, etc.)  - Arrange for interpretive services to assist at discharge as needed  - Refer to Case Management Department for coordinating discharge planning if the patient needs post-hospital services based on physician/advanced practitioner order or complex needs related to functional status, cognitive ability, or social support system  Outcome: Progressing

## 2023-11-27 NOTE — DISCHARGE INSTR - AVS FIRST PAGE
Congratulations Soy Wheeler! For pain:   Tylenol: Up to 1000mg (975mg = 3 tablets) every 6 hours  Motrin: Up to 600mg (1 tablet prescription or 3 tablets of the regular strength 200mg ibuprofen) every 6 hours    I recommend alternative Tylenol and Motrin every 3 hours (Tylenol dosing then 3 hours later Motrin dosing then 3 hours later Tylenol dosing then 3 hours later Motrin dosing, etc.)    Please call the office with any concerns especially if you have any chest pain, shortness of breath, fevers, chills, changes in your vision, persistent headache, elevated blood pressures (>140/>90) or pain in your upper belly on the right side, or concerns about your bleeding.      Take care,   - Dr. Dodge Comp

## 2023-11-27 NOTE — ASSESSMENT & PLAN NOTE
Lochia WNL   Recovering well   Appropriate bowel and bladder function   Pain well controlled   Tolerating diet   Breastfeeding   Ambulating without issues   No lower extremity tenderness  GBS neg   Rh pos

## 2023-11-29 NOTE — ANESTHESIA PROCEDURE NOTES
Anesthesia Notable Event    Date/Time: 11/28/2023 8:47 PM    Performed by: Phil Mathew MD  Authorized by: Phil Mathew MD

## 2023-12-03 LAB — PLACENTA IN STORAGE: NORMAL

## 2023-12-12 NOTE — PROGRESS NOTES
Postpartum Visit   [facility]    Assessment/Plan:  Julio is a 33 y.o. year old  who presents for postpartum visit.    Routine Postpartum Care  - Normal postpartum exam  - Contraception: abstinence, desires tubal (MA31 signed 23, surgical consent signed 23)  - Depression Screen: negative  - Psychosocial support: good support from partner  - Patient Education: Postpartum resources including Pelvic Health PT and Baby & Me, she declines both  - Pap smear up to date (NILM ), next due       Additional Problems:  1. Request for sterilization  Assessment & Plan:  - Today we had a detailed discussion regarding alternatives to permanent contraception, including LARC methods such as intrauterine device and subdermal contraceptive implant. We also discussed the option of partner vasectomy as an option that has lower morbidity.  The patient would like to proceed with tubal ligation, with preferred method being bilateral salpingectomy  - We discussed the risks and benefits of the procedure and established expectations. We discussed surgical risks including bleeding, injury to nearby structures, conversion to laparotomy, postoperative pain, and anesthetic risks.  We discussed risks such as ectopic pregnancy and unintended pregnancy   - We discussed that tubal sterilization is considered a permanent procedure. Future pregnancy would only be possible through in vitro fertilization.    - We discussed that most women are satisfied with their decision to undergo permanent sterilization, but there is risk of regret, with this being higher in women <31 yo  - She expressed good understanding of the procedure, risks, and benefits and gave written informed consent.  - MA31 was signed previously 23          2. Postpartum state      Subjective:     CC: Postpartum visit    Juloi Waters is a 33 y.o. female  who presents for a postpartum visit.     She is approximately 3 weeks postpartum  "following an  at 39 weeks. She presented to labor and delivery for contractions and was noted to be 5/70/-1 on admission.  She was admitted and received an epidural for pain control.  Amniotomy was performed for clear fluid.  Pregnancy was otherwise uncomplicated.  She was augmented with Pitocin during pushing to achieve contractions that were closer together and stronger.      Delivery Findings:  Delivery: 2023  6:28 AM  via Vaginal, Spontaneous  . The delivery was uncomplicated  Baby's Weight: 3595 g (7 lb 14.8 oz) ; 126.81     Apgar scores: 9  and 9  at 1 and 5 minutes, respectively  Anesthesia: Epidural ,       ROS:  Bleeding staining only. Bowel function is normal. Bladder function is normal. Patient is not sexually active. Contraception method is abstinence. Postpartum depression screening: negative with a PHQ-2 score of 3. Baby's course has been unremarkable    The following portions of the patient's history were reviewed and updated as appropriate: allergies, current medications, past family history, past medical history, obstetric history, gynecologic history, past social history, past surgical history and problem list.    Objective:  /69 (BP Location: Right arm, Patient Position: Sitting, Cuff Size: Large)   Pulse 80   Ht 5' 3\" (1.6 m)   Wt 57.9 kg (127 lb 9.6 oz)   LMP 2023   Breastfeeding Yes   BMI 22.60 kg/m²   Pregravid Weight/BMI: 53.1 kg (117 lb) (BMI 20.73)  Current Weight: 57.9 kg (127 lb 9.6 oz)   Total Weight Gain: 12.7 kg (28 lb)   Pre- Vitals      Flowsheet Row Most Recent Value   Prenatal Assessment    Prenatal Vitals    Blood Pressure 104/69   Weight - Scale 57.9 kg (127 lb 9.6 oz)   Urine Albumin/Glucose    Dilation/Effacement/Station    Vaginal Drainage    Edema            General: Well appearing, no distress.  Mood and affect: Appropriate.  Respiratory: Unlabored breathing  Cardiovascular: Regular rate and rhythm.  Abdomen: Soft, nontender  Incision: " healed  Vulva: normal  Vagina: normal  Urethra: normal  Cervix: normal  Extremities: Warm and well perfused.  Non tender.    Brianda Mcarthur DO  OB/GYN  12:26 PM

## 2023-12-19 ENCOUNTER — POSTPARTUM VISIT (OUTPATIENT)
Dept: OBGYN CLINIC | Facility: CLINIC | Age: 33
End: 2023-12-19

## 2023-12-19 VITALS
BODY MASS INDEX: 22.61 KG/M2 | HEIGHT: 63 IN | SYSTOLIC BLOOD PRESSURE: 104 MMHG | DIASTOLIC BLOOD PRESSURE: 69 MMHG | HEART RATE: 80 BPM | WEIGHT: 127.6 LBS

## 2023-12-19 DIAGNOSIS — Z30.2 REQUEST FOR STERILIZATION: Primary | ICD-10-CM

## 2023-12-19 PROBLEM — Z87.59 HISTORY OF VACUUM EXTRACTION ASSISTED DELIVERY: Status: RESOLVED | Noted: 2023-09-08 | Resolved: 2023-12-19

## 2023-12-19 PROBLEM — O09.523 MULTIGRAVIDA OF ADVANCED MATERNAL AGE IN THIRD TRIMESTER: Status: RESOLVED | Noted: 2023-05-25 | Resolved: 2023-12-19

## 2023-12-19 NOTE — ASSESSMENT & PLAN NOTE
- Today we had a detailed discussion regarding alternatives to permanent contraception, including LARC methods such as intrauterine device and subdermal contraceptive implant. We also discussed the option of partner vasectomy as an option that has lower morbidity.  The patient would like to proceed with tubal ligation, with preferred method being bilateral salpingectomy  - We discussed the risks and benefits of the procedure and established expectations. We discussed surgical risks including bleeding, injury to nearby structures, conversion to laparotomy, postoperative pain, and anesthetic risks.  We discussed risks such as ectopic pregnancy and unintended pregnancy   - We discussed that tubal sterilization is considered a permanent procedure. Future pregnancy would only be possible through in vitro fertilization.    - We discussed that most women are satisfied with their decision to undergo permanent sterilization, but there is risk of regret, with this being higher in women <29 yo  - She expressed good understanding of the procedure, risks, and benefits and gave written informed consent.  - MA31 was signed previously 9/8/23

## 2023-12-21 ENCOUNTER — PREP FOR PROCEDURE (OUTPATIENT)
Dept: OBGYN CLINIC | Facility: CLINIC | Age: 33
End: 2023-12-21

## 2023-12-21 ENCOUNTER — TELEPHONE (OUTPATIENT)
Dept: OTHER | Facility: HOSPITAL | Age: 33
End: 2023-12-21

## 2023-12-21 DIAGNOSIS — Z30.2 STERILIZATION: Primary | ICD-10-CM

## 2023-12-21 NOTE — TELEPHONE ENCOUNTER
[facility] Surgical Case Review  Date Reviewed: 12/21/23  Reviewed by: Russel Pena DeFruscio, Sabouni   Case request: EUA, laparoscopic bilateral salpingectomy  Indication: undesired fertility  Surgical Consent: 12/19/23  MA30/31: 9/8/23    Case request approved: Yes    Comments:  None    Giana Pena MD  PGY-2, OBGYN  12/21/23

## 2023-12-26 ENCOUNTER — OFFICE VISIT (OUTPATIENT)
Dept: DENTISTRY | Facility: CLINIC | Age: 33
End: 2023-12-26

## 2023-12-26 DIAGNOSIS — Z01.21 ENCOUNTER FOR DENTAL EXAMINATION AND CLEANING WITH ABNORMAL FINDINGS: Primary | ICD-10-CM

## 2023-12-26 PROCEDURE — D0140 LIMITED ORAL EVALUATION - PROBLEM FOCUSED: HCPCS

## 2023-12-26 PROCEDURE — D0230 INTRAORAL - PERIAPICAL EACH ADDITIONAL RADIOGRAPHIC IMAGE: HCPCS

## 2023-12-26 PROCEDURE — D0220 INTRAORAL - PERIAPICAL FIRST RADIOGRAPHIC IMAGE: HCPCS

## 2023-12-26 PROCEDURE — D0270 BITEWING - SINGLE RADIOGRAPHIC IMAGE: HCPCS

## 2023-12-26 NOTE — DENTAL PROCEDURE DETAILS
"Pt presents for a Limited exam. Verbal consent for treatment given in addition to the forms.  Reviewed health history - Patient is ASA I  Consents signed: Yes   Perio: Slight bleeding  Pain Scale: 2  Caries Assessment: medium  Oral Hygiene instruction reviewed and given.  Hygiene recall visits recommended to the patient    CC: \"About 6-7 days ago, I noticed that I have a sensitivity to cold on the top left and bottom left.\"    Radiographs taken:  1 BW left side  PA UL and LL  No PAPs noted  29 DO and 30 MO caries noted    Clinical examination:  14: + percussion, palpation WNL  29, 30, 32: percussion, palpation, cold test WNL    Discussed with pt that her lower tooth pain / sensitivity could be due to her caries on 29 and 30. Discussed with pt that her upper left pain could be due to occlusion. Occlusion checked with articulating paper. High marks noted on 14 and 30 L cusp. Occlusion adjusted and verified. Discussed with pt to take ibuprofen for the next two days to lessen the inflammation to the tooth fibers. Discussed with pt importance of returning for a comprehensive exam. Answered all pt questions. Pt understood.     Prognosis is Good.  Referrals needed: No    Next visit: Comp exam  Attending: Dr. Wei was present for the duration of the visit  "

## 2024-01-05 ENCOUNTER — OFFICE VISIT (OUTPATIENT)
Dept: OBGYN CLINIC | Facility: CLINIC | Age: 34
End: 2024-01-05

## 2024-01-05 VITALS
HEIGHT: 63 IN | WEIGHT: 127.6 LBS | HEART RATE: 91 BPM | DIASTOLIC BLOOD PRESSURE: 69 MMHG | RESPIRATION RATE: 18 BRPM | SYSTOLIC BLOOD PRESSURE: 103 MMHG | BODY MASS INDEX: 22.61 KG/M2

## 2024-01-05 DIAGNOSIS — Z12.4 ENCOUNTER FOR PAPANICOLAOU SMEAR FOR CERVICAL CANCER SCREENING: ICD-10-CM

## 2024-01-05 DIAGNOSIS — Z30.2 REQUEST FOR STERILIZATION: Primary | ICD-10-CM

## 2024-01-05 PROCEDURE — G0143 SCR C/V CYTO,THINLAYER,RESCR: HCPCS | Performed by: OBSTETRICS & GYNECOLOGY

## 2024-01-05 PROCEDURE — G0476 HPV COMBO ASSAY CA SCREEN: HCPCS | Performed by: OBSTETRICS & GYNECOLOGY

## 2024-01-05 PROCEDURE — 99213 OFFICE O/P EST LOW 20 MIN: CPT | Performed by: OBSTETRICS & GYNECOLOGY

## 2024-01-05 NOTE — PROGRESS NOTES
"H&P Exam  Julio Waters 33 y.o. female MRN: [id number]  Unit/Bed#:  Encounter: [id number]      HPI:  Julio Waters is a 33 y.o.  female who will be undergoing EUA, laparoscopic bilateral salpingectomy.    The MA31 signed 23, and surgical consent signed 23 at her postpartum visit.  Last Pap in 2019 was NILM, she is due today for a Pap.    Blood use during admission if needed: yes  CPR during admission if needed: yes    OB History    Para Term  AB Living   6 4 4   2 4   SAB IAB Ectopic Multiple Live Births   2     0 4      # Outcome Date GA Lbr Oscar/2nd Weight Sex Delivery Anes PTL Lv   6 Term 23 39w6d / 00:31 3595 g (7 lb 14.8 oz) M Vag-Spont EPI N JAYSON   5 Term 19 40w2d / 00:23 3430 g (7 lb 9 oz) M Vag-Spont EPI N JAYSON   4 2017           3 2016           2 Term 13 38w6d  3175 g (7 lb) F Vag-Spont  N JAYSON   1 Term 01/10/11 39w6d  3629 g (8 lb) M Vag-Spont  N JAYSON       Historical Information   Past Medical History:   Diagnosis Date    HPV (human papilloma virus) infection 2010    ASCUS    Miscarriage         Normal delivery 01/10/2011    Vacuum assist     History reviewed. No pertinent surgical history.  Social History   Social History     Substance and Sexual Activity   Alcohol Use No     Social History     Substance and Sexual Activity   Drug Use No     Social History     Tobacco Use   Smoking Status Never   Smokeless Tobacco Never         OBJECTIVE:    Vitals: Blood pressure 103/69, pulse 91, resp. rate 18, height 5' 3\" (1.6 m), weight 57.9 kg (127 lb 9.6 oz), currently breastfeeding.Body mass index is 22.6 kg/m².    GEN: The patient was alert and oriented x3, pleasant well-appearing female in no acute distress.   CV: Regular rate  PULM: nonlabored respirations  MSK: Normal gait  Skin: warm, dry  Pelvic: Parous cervix, relaxed vagina, Pap smear collected  Neuro: no focal deficits  Psych: normal affect and judgement, cooperative      Assessment/Plan "   - Today we had a detailed discussion regarding alternatives to permanent contraception, including LARC methods such as intrauterine device and subdermal contraceptive implant. We also discussed the option of partner vasectomy as an option that has lower morbidity.  The patient would like to proceed with tubal ligation, with preferred method being bilateral salpingectomy  - We discussed the risks and benefits of the procedure and established expectations. We discussed surgical risks including bleeding, injury to nearby structures, conversion to laparotomy, postoperative pain, and anesthetic risks.  We discussed risks such as ectopic pregnancy and unintended pregnancy   - We discussed that tubal sterilization is considered a permanent procedure. Future pregnancy would only be possible through in vitro fertilization.    - We discussed that most women are satisfied with their decision to undergo permanent sterilization, but there is risk of regret, with this being higher in women <31 yo  - She expressed good understanding of the procedure, risks, and benefits and gave written informed consent.  - Pt demonstrated understanding to all of the above. No additional questions or concerns were raised; pt is aware of how to reach us should questions/concerns rise  - Pt to present for surgery as scheduled  - Pt to return to outpatient office 2w postop      [facility] initially scheduled for surgery with Dr. Forrest on January 19, however she is a hijab wearing [employer] and would prefer to have a female surgeon if possible.  We can reasonably accommodate this request as this is an elective procedure.  She is amenable to a male doctor if medically necessary.  I spoke with Keith Claire our surgery coordinator, and the patient, and all are in agreement to switch to 1/24 Wednesday afternoon.         Brianda Mcarthur,   1/5/2024  12:08 PM

## 2024-01-05 NOTE — H&P (VIEW-ONLY)
"H&P Exam  Julio Waters 33 y.o. female MRN: [id number]  Unit/Bed#:  Encounter: [id number]      HPI:  Julio Waters is a 33 y.o.  female who will be undergoing EUA, laparoscopic bilateral salpingectomy.    The MA31 signed 23, and surgical consent signed 23 at her postpartum visit.  Last Pap in 2019 was NILM, she is due today for a Pap.    Blood use during admission if needed: yes  CPR during admission if needed: yes    OB History    Para Term  AB Living   6 4 4   2 4   SAB IAB Ectopic Multiple Live Births   2     0 4      # Outcome Date GA Lbr Oscar/2nd Weight Sex Delivery Anes PTL Lv   6 Term 23 39w6d / 00:31 3595 g (7 lb 14.8 oz) M Vag-Spont EPI N JAYSON   5 Term 19 40w2d / 00:23 3430 g (7 lb 9 oz) M Vag-Spont EPI N JAYSON   4 2017           3 2016           2 Term 13 38w6d  3175 g (7 lb) F Vag-Spont  N JAYSON   1 Term 01/10/11 39w6d  3629 g (8 lb) M Vag-Spont  N JAYSON       Historical Information   Past Medical History:   Diagnosis Date    HPV (human papilloma virus) infection 2010    ASCUS    Miscarriage         Normal delivery 01/10/2011    Vacuum assist     History reviewed. No pertinent surgical history.  Social History   Social History     Substance and Sexual Activity   Alcohol Use No     Social History     Substance and Sexual Activity   Drug Use No     Social History     Tobacco Use   Smoking Status Never   Smokeless Tobacco Never         OBJECTIVE:    Vitals: Blood pressure 103/69, pulse 91, resp. rate 18, height 5' 3\" (1.6 m), weight 57.9 kg (127 lb 9.6 oz), currently breastfeeding.Body mass index is 22.6 kg/m².    GEN: The patient was alert and oriented x3, pleasant well-appearing female in no acute distress.   CV: Regular rate  PULM: nonlabored respirations  MSK: Normal gait  Skin: warm, dry  Pelvic: Parous cervix, relaxed vagina, Pap smear collected  Neuro: no focal deficits  Psych: normal affect and judgement, cooperative      Assessment/Plan "   - Today we had a detailed discussion regarding alternatives to permanent contraception, including LARC methods such as intrauterine device and subdermal contraceptive implant. We also discussed the option of partner vasectomy as an option that has lower morbidity.  The patient would like to proceed with tubal ligation, with preferred method being bilateral salpingectomy  - We discussed the risks and benefits of the procedure and established expectations. We discussed surgical risks including bleeding, injury to nearby structures, conversion to laparotomy, postoperative pain, and anesthetic risks.  We discussed risks such as ectopic pregnancy and unintended pregnancy   - We discussed that tubal sterilization is considered a permanent procedure. Future pregnancy would only be possible through in vitro fertilization.    - We discussed that most women are satisfied with their decision to undergo permanent sterilization, but there is risk of regret, with this being higher in women <29 yo  - She expressed good understanding of the procedure, risks, and benefits and gave written informed consent.  - Pt demonstrated understanding to all of the above. No additional questions or concerns were raised; pt is aware of how to reach us should questions/concerns rise  - Pt to present for surgery as scheduled  - Pt to return to outpatient office 2w postop      [facility] initially scheduled for surgery with Dr. Forrest on January 19, however she is a hijab wearing [employer]vism and would prefer to have a female surgeon if possible.  We can reasonably accommodate this request as this is an elective procedure.  She is amenable to a male doctor if medically necessary.  I spoke with Keith Claire our surgery coordinator, and the patient, and all are in agreement to switch to 1/24 Wednesday afternoon.         Brianda Mcarthur,   1/5/2024  12:08 PM

## 2024-01-09 LAB
HPV HR 12 DNA CVX QL NAA+PROBE: NEGATIVE
HPV16 DNA CVX QL NAA+PROBE: NEGATIVE
HPV18 DNA CVX QL NAA+PROBE: NEGATIVE

## 2024-01-11 LAB
LAB AP GYN PRIMARY INTERPRETATION: NORMAL
Lab: NORMAL

## 2024-01-12 ENCOUNTER — TELEPHONE (OUTPATIENT)
Dept: OBGYN CLINIC | Facility: CLINIC | Age: 34
End: 2024-01-12

## 2024-01-12 NOTE — RESULT ENCOUNTER NOTE
Julio's pap came back as insufficient and HPV negative. She is scheduled for a tubal for permanent contraception with Dr. Valdovinos & Dr. Potts (family planning resident) on 1/24/24. She has never had an abnormal pap and one lifetime partner. Will see if another pap can be collected in the OR. If not, will re-do her pap at her post-operative visit with me on 2/7/24.

## 2024-01-12 NOTE — TELEPHONE ENCOUNTER
Patient informed HPV is negative and pap smear needs to be re-collected due to insufficient cells, patient verbalized understanding.

## 2024-01-12 NOTE — TELEPHONE ENCOUNTER
----- Message from Brianda Mcarthur DO sent at 1/12/2024 11:48 AM EST -----  Please call patient and let her know that HPV testing was negative, but Pap smear did not have enough cells for them to analyze it.  I am looking into seeing if it can be collected when she gets her tubes done in the OR.  ----- Message -----  From: Lab, Background User  Sent: 1/9/2024   8:25 AM EST  To: Brianda Mcarthur DO

## 2024-01-17 NOTE — PRE-PROCEDURE INSTRUCTIONS
Pre-Surgery Instructions:   Medication Instructions    Calcium Carb-Cholecalciferol (CALCIUM 600 + D PO) Stop taking 7 days prior to surgery.    Prenatal Multivit-Min-Fe-FA (Pre- Formula) TABS Stop taking 7 days prior to surgery.    Medication instructions for day surgery reviewed. Please use only a sip of water to take your instructed medications. Avoid all over the counter vitamins, supplements and NSAIDS for one week prior to surgery per anesthesia guidelines. Tylenol is ok to take as needed.     You will receive a call one business day prior to surgery with an arrival time and hospital directions. If your surgery is scheduled on a Monday, the hospital will be calling you on the Friday prior to your surgery. If you have not heard from anyone by 8pm, please call the hospital supervisor through the hospital  at [phone]. (Rod [phone]).    Do not eat or drink anything after midnight the night before your surgery, including candy, mints, lifesavers, or chewing gum. Do not drink alcohol 24hrs before your surgery. Try not to smoke at least 24hrs before your surgery.       Follow the pre surgery showering instructions as listed in the “My Surgical Experience Booklet” or otherwise provided by your surgeon's office. Do not use a blade to shave the surgical area 1 week before surgery. It is okay to use a clean electric clippers up to 24 hours before surgery. Do not apply any lotions, creams, including makeup, cologne, deodorant, or perfumes after showering on the day of your surgery. Do not use dry shampoo, hair spray, hair gel, or any type of hair products.     No contact lenses, eye make-up, or artificial eyelashes. Remove nail polish, including gel polish, and any artificial, gel, or acrylic nails if possible. Remove all jewelry including rings and body piercing jewelry.     Wear causal clothing that is easy to take on and off. Consider your type of surgery.    Keep any valuables, jewelry,  piercings at home. Please bring any specially ordered equipment (sling, braces) if indicated.    Arrange for a responsible person to drive you to and from the hospital on the day of your surgery. Visitor Guidelines discussed.     Call the surgeon's office with any new illnesses, exposures, or additional questions prior to surgery.    Please reference your “My Surgical Experience Booklet” for additional information to prepare for your upcoming surgery. Pt instructed to stop nsaids and supplements one week prior to surgery.  Pt verbalized understanding of shower and med instructions.

## 2024-01-23 ENCOUNTER — ANESTHESIA EVENT (OUTPATIENT)
Dept: PERIOP | Facility: HOSPITAL | Age: 34
End: 2024-01-23
Payer: COMMERCIAL

## 2024-01-24 ENCOUNTER — HOSPITAL ENCOUNTER (OUTPATIENT)
Facility: HOSPITAL | Age: 34
Setting detail: OUTPATIENT SURGERY
Discharge: HOME | End: 2024-01-24
Attending: OBSTETRICS & GYNECOLOGY | Admitting: OBSTETRICS & GYNECOLOGY
Payer: COMMERCIAL

## 2024-01-24 ENCOUNTER — ANESTHESIA (OUTPATIENT)
Dept: PERIOP | Facility: HOSPITAL | Age: 34
End: 2024-01-24
Payer: COMMERCIAL

## 2024-01-24 VITALS
RESPIRATION RATE: 12 BRPM | OXYGEN SATURATION: 99 % | WEIGHT: 127 LBS | TEMPERATURE: 97.3 F | DIASTOLIC BLOOD PRESSURE: 73 MMHG | BODY MASS INDEX: 22.5 KG/M2 | HEIGHT: 63 IN | HEART RATE: 80 BPM | SYSTOLIC BLOOD PRESSURE: 112 MMHG

## 2024-01-24 DIAGNOSIS — Z30.2 STERILIZATION: ICD-10-CM

## 2024-01-24 DIAGNOSIS — Z90.79 STATUS POST BILATERAL SALPINGECTOMY: Primary | ICD-10-CM

## 2024-01-24 LAB
EXT PREGNANCY TEST URINE: NEGATIVE
EXT. CONTROL: NORMAL

## 2024-01-24 PROCEDURE — 88302 TISSUE EXAM BY PATHOLOGIST: CPT | Performed by: STUDENT IN AN ORGANIZED HEALTH CARE EDUCATION/TRAINING PROGRAM

## 2024-01-24 PROCEDURE — 81025 URINE PREGNANCY TEST: CPT | Performed by: OBSTETRICS & GYNECOLOGY

## 2024-01-24 PROCEDURE — 58661 LAPAROSCOPY REMOVE ADNEXA: CPT | Performed by: OBSTETRICS & GYNECOLOGY

## 2024-01-24 RX ORDER — ONDANSETRON 2 MG/ML
4 INJECTION INTRAMUSCULAR; INTRAVENOUS EVERY 6 HOURS PRN
Status: DISCONTINUED | OUTPATIENT
Start: 2024-01-24 | End: 2024-01-24 | Stop reason: HOSPADM

## 2024-01-24 RX ORDER — FENTANYL CITRATE 50 UG/ML
INJECTION, SOLUTION INTRAMUSCULAR; INTRAVENOUS AS NEEDED
Status: DISCONTINUED | OUTPATIENT
Start: 2024-01-24 | End: 2024-01-24

## 2024-01-24 RX ORDER — IBUPROFEN 600 MG/1
600 TABLET ORAL EVERY 6 HOURS PRN
Start: 2024-01-24

## 2024-01-24 RX ORDER — SODIUM CHLORIDE, SODIUM LACTATE, POTASSIUM CHLORIDE, CALCIUM CHLORIDE 600; 310; 30; 20 MG/100ML; MG/100ML; MG/100ML; MG/100ML
125 INJECTION, SOLUTION INTRAVENOUS CONTINUOUS
Status: DISCONTINUED | OUTPATIENT
Start: 2024-01-24 | End: 2024-01-24 | Stop reason: HOSPADM

## 2024-01-24 RX ORDER — DEXAMETHASONE SODIUM PHOSPHATE 10 MG/ML
INJECTION, SOLUTION INTRAMUSCULAR; INTRAVENOUS AS NEEDED
Status: DISCONTINUED | OUTPATIENT
Start: 2024-01-24 | End: 2024-01-24

## 2024-01-24 RX ORDER — IBUPROFEN 600 MG/1
TABLET ORAL
Status: DISCONTINUED
Start: 2024-01-24 | End: 2024-01-24 | Stop reason: HOSPADM

## 2024-01-24 RX ORDER — DIPHENHYDRAMINE HYDROCHLORIDE 50 MG/ML
12.5 INJECTION INTRAMUSCULAR; INTRAVENOUS ONCE AS NEEDED
Status: DISCONTINUED | OUTPATIENT
Start: 2024-01-24 | End: 2024-01-24 | Stop reason: HOSPADM

## 2024-01-24 RX ORDER — ACETAMINOPHEN 325 MG/1
650 TABLET ORAL EVERY 6 HOURS PRN
Start: 2024-01-24

## 2024-01-24 RX ORDER — KETOROLAC TROMETHAMINE 30 MG/ML
INJECTION, SOLUTION INTRAMUSCULAR; INTRAVENOUS AS NEEDED
Status: DISCONTINUED | OUTPATIENT
Start: 2024-01-24 | End: 2024-01-24

## 2024-01-24 RX ORDER — FENTANYL CITRATE/PF 50 MCG/ML
50 SYRINGE (ML) INJECTION
Status: DISCONTINUED | OUTPATIENT
Start: 2024-01-24 | End: 2024-01-24 | Stop reason: HOSPADM

## 2024-01-24 RX ORDER — IBUPROFEN 600 MG/1
600 TABLET ORAL EVERY 6 HOURS PRN
Status: DISCONTINUED | OUTPATIENT
Start: 2024-01-24 | End: 2024-01-24 | Stop reason: HOSPADM

## 2024-01-24 RX ORDER — MAGNESIUM HYDROXIDE 1200 MG/15ML
LIQUID ORAL AS NEEDED
Status: DISCONTINUED | OUTPATIENT
Start: 2024-01-24 | End: 2024-01-24 | Stop reason: HOSPADM

## 2024-01-24 RX ORDER — PROMETHAZINE HYDROCHLORIDE 25 MG/ML
25 INJECTION, SOLUTION INTRAMUSCULAR; INTRAVENOUS ONCE AS NEEDED
Status: DISCONTINUED | OUTPATIENT
Start: 2024-01-24 | End: 2024-01-24 | Stop reason: HOSPADM

## 2024-01-24 RX ORDER — BUPIVACAINE HYDROCHLORIDE 2.5 MG/ML
INJECTION, SOLUTION EPIDURAL; INFILTRATION; INTRACAUDAL AS NEEDED
Status: DISCONTINUED | OUTPATIENT
Start: 2024-01-24 | End: 2024-01-24 | Stop reason: HOSPADM

## 2024-01-24 RX ORDER — HYDROMORPHONE HCL/PF 1 MG/ML
0.5 SYRINGE (ML) INJECTION
Status: DISCONTINUED | OUTPATIENT
Start: 2024-01-24 | End: 2024-01-24 | Stop reason: HOSPADM

## 2024-01-24 RX ORDER — MIDAZOLAM HYDROCHLORIDE 2 MG/2ML
INJECTION, SOLUTION INTRAMUSCULAR; INTRAVENOUS AS NEEDED
Status: DISCONTINUED | OUTPATIENT
Start: 2024-01-24 | End: 2024-01-24

## 2024-01-24 RX ORDER — ROCURONIUM BROMIDE 10 MG/ML
INJECTION, SOLUTION INTRAVENOUS AS NEEDED
Status: DISCONTINUED | OUTPATIENT
Start: 2024-01-24 | End: 2024-01-24

## 2024-01-24 RX ORDER — ONDANSETRON 2 MG/ML
INJECTION INTRAMUSCULAR; INTRAVENOUS
Status: DISCONTINUED
Start: 2024-01-24 | End: 2024-01-24 | Stop reason: HOSPADM

## 2024-01-24 RX ORDER — ONDANSETRON 2 MG/ML
4 INJECTION INTRAMUSCULAR; INTRAVENOUS ONCE AS NEEDED
Status: DISCONTINUED | OUTPATIENT
Start: 2024-01-24 | End: 2024-01-24 | Stop reason: HOSPADM

## 2024-01-24 RX ORDER — SODIUM CHLORIDE, SODIUM LACTATE, POTASSIUM CHLORIDE, CALCIUM CHLORIDE 600; 310; 30; 20 MG/100ML; MG/100ML; MG/100ML; MG/100ML
INJECTION, SOLUTION INTRAVENOUS CONTINUOUS PRN
Status: DISCONTINUED | OUTPATIENT
Start: 2024-01-24 | End: 2024-01-24

## 2024-01-24 RX ORDER — OXYCODONE HYDROCHLORIDE 5 MG/1
5 TABLET ORAL EVERY 4 HOURS PRN
Qty: 6 TABLET | Refills: 0 | Status: SHIPPED | OUTPATIENT
Start: 2024-01-24 | End: 2024-02-03

## 2024-01-24 RX ORDER — PROPOFOL 10 MG/ML
INJECTION, EMULSION INTRAVENOUS AS NEEDED
Status: DISCONTINUED | OUTPATIENT
Start: 2024-01-24 | End: 2024-01-24

## 2024-01-24 RX ORDER — FENTANYL CITRATE/PF 50 MCG/ML
25 SYRINGE (ML) INJECTION
Status: DISCONTINUED | OUTPATIENT
Start: 2024-01-24 | End: 2024-01-24 | Stop reason: HOSPADM

## 2024-01-24 RX ORDER — OXYCODONE HYDROCHLORIDE 5 MG/1
5 TABLET ORAL EVERY 4 HOURS PRN
Status: DISCONTINUED | OUTPATIENT
Start: 2024-01-24 | End: 2024-01-24 | Stop reason: HOSPADM

## 2024-01-24 RX ORDER — ONDANSETRON 2 MG/ML
INJECTION INTRAMUSCULAR; INTRAVENOUS AS NEEDED
Status: DISCONTINUED | OUTPATIENT
Start: 2024-01-24 | End: 2024-01-24

## 2024-01-24 RX ORDER — LIDOCAINE HYDROCHLORIDE 10 MG/ML
INJECTION, SOLUTION EPIDURAL; INFILTRATION; INTRACAUDAL; PERINEURAL AS NEEDED
Status: DISCONTINUED | OUTPATIENT
Start: 2024-01-24 | End: 2024-01-24

## 2024-01-24 RX ORDER — ACETAMINOPHEN 325 MG/1
975 TABLET ORAL EVERY 6 HOURS PRN
Status: DISCONTINUED | OUTPATIENT
Start: 2024-01-24 | End: 2024-01-24 | Stop reason: HOSPADM

## 2024-01-24 RX ADMIN — SODIUM CHLORIDE, SODIUM LACTATE, POTASSIUM CHLORIDE, AND CALCIUM CHLORIDE: .6; .31; .03; .02 INJECTION, SOLUTION INTRAVENOUS at 13:27

## 2024-01-24 RX ADMIN — MIDAZOLAM 2 MG: 1 INJECTION INTRAMUSCULAR; INTRAVENOUS at 13:25

## 2024-01-24 RX ADMIN — IBUPROFEN 600 MG: 600 TABLET, FILM COATED ORAL at 17:01

## 2024-01-24 RX ADMIN — DEXAMETHASONE SODIUM PHOSPHATE 10 MG: 10 INJECTION, SOLUTION INTRAMUSCULAR; INTRAVENOUS at 13:34

## 2024-01-24 RX ADMIN — ONDANSETRON 4 MG: 2 INJECTION INTRAMUSCULAR; INTRAVENOUS at 14:18

## 2024-01-24 RX ADMIN — FENTANYL CITRATE 50 MCG: 50 INJECTION INTRAMUSCULAR; INTRAVENOUS at 13:34

## 2024-01-24 RX ADMIN — KETOROLAC TROMETHAMINE 30 MG: 30 INJECTION, SOLUTION INTRAMUSCULAR; INTRAVENOUS at 14:18

## 2024-01-24 RX ADMIN — ROCURONIUM BROMIDE 30 MG: 10 INJECTION, SOLUTION INTRAVENOUS at 13:34

## 2024-01-24 RX ADMIN — ONDANSETRON 4 MG: 2 INJECTION INTRAMUSCULAR; INTRAVENOUS at 16:08

## 2024-01-24 RX ADMIN — LIDOCAINE HYDROCHLORIDE 50 MG: 10 INJECTION, SOLUTION EPIDURAL; INFILTRATION; INTRACAUDAL; PERINEURAL at 13:34

## 2024-01-24 RX ADMIN — SODIUM CHLORIDE, SODIUM LACTATE, POTASSIUM CHLORIDE, AND CALCIUM CHLORIDE: .6; .31; .03; .02 INJECTION, SOLUTION INTRAVENOUS at 14:14

## 2024-01-24 RX ADMIN — PROPOFOL 200 MG: 10 INJECTION, EMULSION INTRAVENOUS at 13:34

## 2024-01-24 RX ADMIN — FENTANYL CITRATE 50 MCG: 50 INJECTION INTRAMUSCULAR; INTRAVENOUS at 14:05

## 2024-01-24 NOTE — INTERVAL H&P NOTE
H&P reviewed. After examining the patient I find no changes in the patients condition since the H&P had been written.    Vitals:    01/24/24 1201   BP: 96/66   Pulse: 77   Resp: 18   Temp: 98.4 °F (36.9 °C)   SpO2: 99%     Head: normocephalic, atraumatic  CV: regular rate and rhythm  Lungs: clear to auscultation bilaterally  Abdomen: soft, nontender  Extremities: no pain/swelling b/l LE

## 2024-01-24 NOTE — OP NOTE
OPERATIVE REPORT  PATIENT NAME: Julio Waters    :  1990  MRN: [id number]  Pt Location:  OR ROOM 06    SURGERY DATE: 2024    Surgeons and Role:     * Jesusita Valdovinos MD - Primary     * Annalee Momin MD - Assisting     * Coby Potts MD - Assisting    Preop Diagnosis:  Sterilization [Z30.2]    Post-Op Diagnosis Codes:     * Sterilization [Z30.2]    Procedure(s):  Bilateral - SALPINGECTOMY. LAPAROSCOPIC. Exam under Anesthesia  EXAM UNDER ANESTHESIA (EUA)    Specimen(s):  ID Type Source Tests Collected by Time Destination   1 :  Tissue Fallopian Tubes, Bilateral TISSUE EXAM Jesusita Valdovinos MD 2024 1401        Estimated Blood Loss:   Minimal    Drains:  Urethral Catheter Non-latex 16 Fr. (Active)   Number of days: 0       Anesthesia Type:   General    Operative Indications:  Sterilization [Z30.2]      Operative Findings:  1.  External genitalia grossly normal in appearance.  No ulcerations, no lacerations, no lesions.    2.  Normal-appearing uterus, bilateral tubes and ovaries        Complications:   None apparent    Procedure and Technique:  Brief History    All risks, benefits, and alternatives to the procedure were discussed with the patient and she had the opportunity to ask questions.  The risk of regret of procedure was also addressed with the patient and options for LARC was discussed. Patient expressed desire to continue with tubal sterilization. Informed consent was obtained.     Description of Procedure    Patient was taken to the operating room where correct patient and correct procedure were confirmed. General endotracheal anesthesia (GET) was administered and the patient was positioned on the OR table in the dorsal lithotomy position. All pressure points were padded and a nanette hugger was placed to maintain control of core body temperature.  The patient was prepped and draped in the usual sterile fashion with chloroprep on the abdomen, vagina, and perineum. A time out  was performed.    Operative Technique    A link catheter was introduced into the bladde. A sponge stick was placed in the vagina to serve as the uterine manipulator. Sterile gloves were then exchanged and attention was turned to the abdomen.     Veress needle was inserted into the base of the umbilicus to establish pneumoperitoneum. A 12mm incision was made at the inferior edge of the umbilicus for introduction of a 12mm trocar. Trocar was introduced under direct visualization. Pneumoperitoneum was then established to a maximum of 15mmHg. The entire pelvis was inspected and there was no evidence of injury to bowel, bladder, vasculature, or other structures. Attention was then turned to the pelvis.    Patient was placed in Trendelenburg and the uterus was elevated to visualize the fallopian tubes. There was noted to be grossly normal tubes and ovaries bilaterally. Two additional port sites were selected in the left and right lower abdomen approximately 2cm superior and medial to the iliac crests.  A 5mm incision was made for introduction of a 5mm trocar under direct visualization at each site. A bowel grasper was inserted through this port and used to visualize the fimbriated ends of the tubes.    The right fallopian tube was grasped at its fimbriated end with a blunt grasper and elevated to visualize the mesosalpinx. TheEnseal device was used to ligate along the mesosalpinx, working proximally and taking care to avoid ovarian vasculature. Approximately 2cm from the cornua, the Enseal device was used to amputate fallopian tube. This was then withdrawn from the abdominal cavity and sent for pathology. Attention was then turned to the contralateral tube, which was amputated in similar fashion. Good hemostasis was confirmed following salpingectomy.    Following salpingectomy, pneumoperitoneum was allowed to escape. Adequate hemostasis was visualized. The inferior trocars were removed under direct visualization. The  laparoscope was withdrawn from the abdomen, followed by its trocar sleeve at the umbilicus. Fascia of the 12 mm port site was closed using 0 Vicryl. Skin incisions were closed with 4-0 monocryl and exofin.     Attention was turned to the vagina, sponge stick and link catheter were both removed.     At the conclusion of the procedure, all needle, sponge, and instrument counts were noted to be correct x2. Patient tolerated the procedure well and was transferred to PACU in stable condition prior to discharge with follow up in 1-2 weeks.    Dr. Valdovinos was present and participated in the entire case.       Patient Disposition:  PACU         SIGNATURE: Annalee Momin MD  DATE: January 24, 2024  TIME: 2:40 PM

## 2024-01-24 NOTE — ANESTHESIA POSTPROCEDURE EVALUATION
Post-Op Assessment Note    CV Status:  Stable  Pain Score: 0    Pain management: adequate       Mental Status:  Awake and sleepy   Hydration Status:  Stable   PONV Controlled:  None   Airway Patency:  Patent     Post Op Vitals Reviewed: Yes    No anethesia notable event occurred.    Staff: Anesthesiologist, CRNA               BP   109/72   Temp   98.9   Pulse  93   Resp   12   SpO2   100

## 2024-01-24 NOTE — DISCHARGE INSTRUCTIONS
Salpingectomy   WHAT YOU NEED TO KNOW:   A salpingectomy is surgery to remove one or both of your fallopian tubes. The fallopian tubes carry eggs from the ovaries to the uterus. They are part of a woman's reproductive system. A salpingectomy may be done to treat an ectopic pregnancy, cancer, endometriosis, or an infection. It may also be done to prevent pregnancy or some types of cancer.       DISCHARGE INSTRUCTIONS:   Call your local emergency number (911 in the [place]) for any of the following:   You feel lightheaded, short of breath, and have chest pain.    You cough up blood.    You have trouble breathing.    Seek care immediately if:   Your arm or leg feels warm, tender, and painful. It may look swollen and red.    Blood soaks through your bandage.    Your stitches come apart.    You soak through 1 sanitary pad in 1 hour.    You have trouble urinating or cannot urinate at all.    Call your doctor or surgeon if:   You have a fever or chills.    Your wound is red, swollen, or draining pus.    You have pus or a foul-smelling odor coming from your vagina.    Your pain does not get better after you take your medicine.    You have nausea or are vomiting.    Your skin is itchy, swollen, or you have a rash.    You have questions or concerns about your condition or care.    Medicines:  You may need any of the following:  NSAIDs , such as ibuprofen, help decrease swelling, pain, and fever. NSAIDs can cause stomach bleeding or kidney problems in certain people. If you take blood thinner medicine, always ask your healthcare provider if NSAIDs are safe for you. Always read the medicine label and follow directions.    Prescription pain medicine  may be given. Ask your healthcare provider how to take this medicine safely. Some prescription pain medicines contain acetaminophen. Do not take other medicines that contain acetaminophen without talking to your healthcare provider. Too much acetaminophen may cause liver damage.  Prescription pain medicine may cause constipation. Ask your healthcare provider how to prevent or treat constipation.     Take your medicine as directed.  Contact your healthcare provider if you think your medicine is not helping or if you have side effects. Tell your provider if you are allergic to any medicine. Keep a list of the medicines, vitamins, and herbs you take. Include the amounts, and when and why you take them. Bring the list or the pill bottles to follow-up visits. Carry your medicine list with you in case of an emergency.    Care for your wound as directed:  Ask your healthcare provider when your wound can get wet. Do not take a bath until your healthcare provider says it is okay. Take a shower only. Carefully wash around the wound with soap and water. Let the soap and water gently run over your incision. Do not  scrub your incision. Dry the area and put on new, clean bandages as directed. Change your bandages when they get wet or dirty. If you have strips of medical tape, let them fall off on their own.  Activity:  Ask your healthcare provider when you can return to your normal activities. Do not douche, use tampons, or have sex until your healthcare provider says it is okay. These activities may cause infection. Do not exercise or lift anything heavy until your healthcare provider says it is okay. This may put too much stress on your incision.  Follow up with your doctor or surgeon as directed:  Write down your questions so you remember to ask them during your visits.  © Copyright [employer] 2023 Information is for End User's use only and may not be sold, redistributed or otherwise used for commercial purposes.  The above information is an  only. It is not intended as medical advice for individual conditions or treatments. Talk to your doctor, nurse or pharmacist before following any medical regimen to see if it is safe and effective for you.

## 2024-01-24 NOTE — ANESTHESIA PREPROCEDURE EVALUATION
Procedure:  SALPINGECTOMY, LAPAROSCOPIC, Exam under Anesthesia (Bilateral: Abdomen)  EXAM UNDER ANESTHESIA (EUA) (Vagina )    Relevant Problems   No relevant active problems        Physical Exam    Airway       Dental       Cardiovascular      Pulmonary      Other Findings  post-pubertal.      Anesthesia Plan  ASA Score- 1     Anesthesia Type- general with ASA Monitors.         Additional Monitors:     Airway Plan: ETT.    Comment: General anesthesia, endotracheal tube; standard ASA monitors. Risks and benefits discussed with patient; patient consented and agrees to proceed.    I saw and evaluated the patient. If seen with CRNA, we have discussed the anesthetic plan and I am in agreement that the plan is appropriate for the patient.  Upt neg 1/24/24.       Plan Factors-    Chart reviewed.   Existing labs reviewed.                   Induction- intravenous.    Postoperative Plan- Plan for postoperative opioid use. Planned trial extubation    Informed Consent- Anesthetic plan and risks discussed with patient.  I personally reviewed this patient with the CRNA. Discussed and agreed on the Anesthesia Plan with the CRNA..

## 2024-01-29 PROCEDURE — 88302 TISSUE EXAM BY PATHOLOGIST: CPT | Performed by: STUDENT IN AN ORGANIZED HEALTH CARE EDUCATION/TRAINING PROGRAM

## 2024-02-02 NOTE — PROGRESS NOTES
"OB/GYN VISIT  Julio Waters  2024  1:44 PM    Subjective:     Julio Waters is a 33 y.o.  female who presents for post operative exam.  She underwent a bilateral salpingectomy, laparoscopic on . She had a Pap on 24 which was insufficient cell collection, HPV negative. GC 23 Negative    A. Fallopian tubes, bilateral salpingectomy:   - Unremarkable fallopian tubes.      Past Medical History:   Diagnosis Date    HPV (human papilloma virus) infection 2010    ASCUS    Miscarriage         Normal delivery 01/10/2011    Vacuum assist     Past Surgical History:   Procedure Laterality Date    EXAMINATION UNDER ANESTHESIA N/A 2024    Procedure: EXAM UNDER ANESTHESIA (EUA);  Surgeon: Jesusita Valdovinos MD;  Location: [facility];  Service: Gynecology    WI LAPAROSCOPY W/RMVL ADNEXAL STRUCTURES Bilateral 2024    Procedure: SALPINGECTOMY, LAPAROSCOPIC, Exam under Anesthesia;  Surgeon: Jesusita Valdovinos MD;  Location: [facility];  Service: Gynecology       Objective:    Vitals: Blood pressure 109/64, pulse 68, height 5' 3\" (1.6 m), weight 58.7 kg (129 lb 6.4 oz), currently breastfeeding.Body mass index is 22.92 kg/m².    Physical Exam  HENT:      Head: Normocephalic.      Mouth/Throat:      Mouth: Mucous membranes are moist.   Cardiovascular:      Rate and Rhythm: Normal rate.   Abdominal:      General: Abdomen is flat.      Comments: Port sites c/d/i   Genitourinary:     Comments: Normal parous cervix, pap collected  Skin:     General: Skin is warm.   Neurological:      Mental Status: She is alert.   Psychiatric:         Mood and Affect: Mood normal.         Behavior: Behavior normal.         Assessment/Plan:  Problem List Items Addressed This Visit       Status post bilateral salpingectomy - Primary       D/w Dr. Shireen Mcarthur DO  2024  1:44 PM        Please note that while the recent CURES Act permits medical records be visible for patient review, " clinical documentation is intended for utilization by healthcare professionals.  All test results are immediately available through [facility] as well, and we will review results at earliest opportunity and contact you with the appropriate urgency.  If you have a question about something you see in your chart, please don't hesitate to ask about it at your next appointment.

## 2024-02-07 ENCOUNTER — OFFICE VISIT (OUTPATIENT)
Dept: OBGYN CLINIC | Facility: CLINIC | Age: 34
End: 2024-02-07

## 2024-02-07 VITALS
HEIGHT: 63 IN | WEIGHT: 129.4 LBS | BODY MASS INDEX: 22.93 KG/M2 | HEART RATE: 68 BPM | DIASTOLIC BLOOD PRESSURE: 64 MMHG | SYSTOLIC BLOOD PRESSURE: 109 MMHG

## 2024-02-07 DIAGNOSIS — Z12.4 PAP SMEAR FOR CERVICAL CANCER SCREENING: Primary | ICD-10-CM

## 2024-02-07 DIAGNOSIS — Z90.79 STATUS POST BILATERAL SALPINGECTOMY: ICD-10-CM

## 2024-02-07 PROCEDURE — 99213 OFFICE O/P EST LOW 20 MIN: CPT | Performed by: OBSTETRICS & GYNECOLOGY

## 2024-02-07 PROCEDURE — 88175 CYTOPATH C/V AUTO FLUID REDO: CPT | Performed by: OBSTETRICS & GYNECOLOGY

## 2024-02-07 PROCEDURE — 87624 HPV HI-RISK TYP POOLED RSLT: CPT | Performed by: OBSTETRICS & GYNECOLOGY

## 2024-02-19 LAB
LAB AP GYN PRIMARY INTERPRETATION: NORMAL
Lab: NORMAL

## 2024-02-22 ENCOUNTER — OFFICE VISIT (OUTPATIENT)
Dept: DENTISTRY | Facility: CLINIC | Age: 34
End: 2024-02-22

## 2024-02-22 VITALS — HEART RATE: 88 BPM | SYSTOLIC BLOOD PRESSURE: 106 MMHG | DIASTOLIC BLOOD PRESSURE: 73 MMHG

## 2024-02-22 DIAGNOSIS — K04.7 TOOTH INFECTION: Primary | ICD-10-CM

## 2024-02-22 PROCEDURE — D0220 INTRAORAL - PERIAPICAL FIRST RADIOGRAPHIC IMAGE: HCPCS

## 2024-02-22 PROCEDURE — D0140 LIMITED ORAL EVALUATION - PROBLEM FOCUSED: HCPCS

## 2024-02-22 RX ORDER — AMOXICILLIN 500 MG/1
500 CAPSULE ORAL EVERY 8 HOURS SCHEDULED
Qty: 21 CAPSULE | Refills: 0 | Status: SHIPPED | OUTPATIENT
Start: 2024-02-22 | End: 2024-02-29

## 2024-02-22 NOTE — DENTAL PROCEDURE DETAILS
Limited Ex and PA #16    Patient originally presents for natasha and fmx.  But is in a lot of pain on UL molar.  She states she can barely open her mouth.    Reviewed meds/hhx in Epic. ASA 2    Started to update FMX but patient states due to pain in UL molar, she cannot continue as this time because it is painful to open    Dr GARTH Hernandes Exam:  #16 PARL, pain to palpation. Positive to percussion and temperature sensitivity.     Rec EXT at [facility], referral and copy of PA given to patient.  Referral marked ASAP.    Rx for Amoxicillin printed and given to patient. All electronic services down on [facility] .     [place]: NATASHA/FMX

## 2024-02-22 NOTE — PROGRESS NOTES
Limited Ex and PA #16    Patient originally presents for natasha and fmx.  But is in a lot of pain on UL molar.  She states she can barely open her mouth.    Reviewed meds/hhx in Epic. ASA 2    Started to update FMX but patient states due to pain in UL molar, she cannot continue as this time because it is painful to open    Dr GARTH Hernandes Exam:  #16 PARL, pain to palpation. Positive to percussion and temperature sensitivity.     Rec EXT at [facility], referral and copy of PA given to patient.  Referral marked ASAP.    Rx for Amoxicillin printed and given to patient. All electronic services down on [facility] .     [place]: NATASHA/FMX

## 2024-02-26 ENCOUNTER — TELEPHONE (OUTPATIENT)
Dept: DENTISTRY | Facility: CLINIC | Age: 34
End: 2024-02-26

## 2024-10-02 NOTE — PROGRESS NOTES
Ambulatory Visit  Name: Julio Waters      : 1990      MRN: [id number]  Encounter Provider: Annalee Momin MD  Encounter Date: 10/3/2024   Encounter department: [facility]    Assessment & Plan  Amenorrhea  Patient has been amenorrheic for the past 10 months since her delivery 2023.    Patient does reports breast feeding, but she breast fed her other children and while she was amenorrheic at that time, it was not for this long.   POC pregnancy test negative  She will return to the office if her period does not return after lactation cessation.         History of Present Illness     Julio Waters is a 34 y.o. female who presents today to discuss amenorrhea. She delivered last November and is currently breastfeeding. She had amenorrhea with breast feeding in her prior pregnancies, but never for this long.   We discussed that this is likely normal and secondary only to her breast feeding. We discussed that every pregnancy is different and at this time her amenorrhea is not concerning to me. She is also not distressed by it and does not want any additional testing at this time.    She typically breastfeeds for 18 months and will return to the office if her period does not return after lactation cessation.  History obtained from : patient  Review of Systems   Constitutional:  Negative for chills and fever.   Respiratory:  Negative for cough, shortness of breath and wheezing.    Cardiovascular:  Negative for chest pain and leg swelling.   Gastrointestinal:  Negative for abdominal pain, diarrhea, nausea and vomiting.   Genitourinary:  Negative for pelvic pain, vaginal bleeding and vaginal discharge.   Musculoskeletal:  Negative for back pain.   Neurological:  Negative for weakness, light-headedness and headaches.           Objective     There were no vitals taken for this visit.    Physical Exam  Vitals and nursing note reviewed.   Constitutional:        General: She is not in acute distress.     Appearance: She is well-developed.   HENT:      Head: Normocephalic and atraumatic.   Eyes:      Conjunctiva/sclera: Conjunctivae normal.   Cardiovascular:      Rate and Rhythm: Normal rate and regular rhythm.      Heart sounds: No murmur heard.  Pulmonary:      Effort: Pulmonary effort is normal. No respiratory distress.      Breath sounds: Normal breath sounds.   Abdominal:      Palpations: Abdomen is soft.      Tenderness: There is no abdominal tenderness.   Musculoskeletal:         General: No swelling.      Cervical back: Neck supple.   Skin:     General: Skin is warm and dry.      Capillary Refill: Capillary refill takes less than 2 seconds.   Neurological:      Mental Status: She is alert.   Psychiatric:         Mood and Affect: Mood normal.       Administrative Statements   I have spent a total time of 30 minutes in caring for this patient on the day of the visit/encounter including Diagnostic results, Risks and benefits of tx options, Impressions, and Documenting in the medical record.    Discussed with Dr. Bo Momin MD  OBGYN PGY-3  10/2/2024 4:04 PM

## 2024-10-03 ENCOUNTER — OFFICE VISIT (OUTPATIENT)
Dept: OBGYN CLINIC | Facility: CLINIC | Age: 34
End: 2024-10-03

## 2024-10-03 VITALS
BODY MASS INDEX: 21.3 KG/M2 | HEART RATE: 97 BPM | DIASTOLIC BLOOD PRESSURE: 65 MMHG | RESPIRATION RATE: 18 BRPM | SYSTOLIC BLOOD PRESSURE: 104 MMHG | HEIGHT: 63 IN | WEIGHT: 120.2 LBS

## 2024-10-03 DIAGNOSIS — Z32.02 PREGNANCY TEST NEGATIVE: ICD-10-CM

## 2024-10-03 DIAGNOSIS — N91.2 AMENORRHEA: Primary | ICD-10-CM

## 2024-10-03 LAB — SL AMB POCT URINE HCG: NEGATIVE

## 2024-10-03 PROCEDURE — 99213 OFFICE O/P EST LOW 20 MIN: CPT | Performed by: OBSTETRICS & GYNECOLOGY

## 2024-10-03 PROCEDURE — 81025 URINE PREGNANCY TEST: CPT | Performed by: OBSTETRICS & GYNECOLOGY

## 2024-10-08 NOTE — PLAN OF CARE
"[facility] Medicine  Progress Note    Patient Name: Andrew Del Cid Jr.  MRN: [id number]  Patient Class: IP- Inpatient   Admission Date: 10/6/2024  Length of Stay: 2 days  Attending Physician: Olena Thornton DO  Primary Care Provider: Olena Thornton DO        Subjective:     Principal Problem:Bacteremia due to methicillin resistant Staphylococcus aureus        HPI:  Chief Complaint   Patient presents with    Weakness       Pt stated that for the past couple weeks he has been experiencing fatigue / decreased appetite / cough / subjective fever / "cold sweats".        55-year-old male with a history of acute renal failure, atrial fibrillation in the past, off of his blood thinners, diabetes mellitus, hyperlipidemia, hypertension, peripheral neuropathy presents the emergency department stating that for the past couple of weeks he has been experiencing fatigue, subjective fevers, cold sweats, cough, not eating or drinking well.  He has been seeing  as well as  for his wound care to bilateral feet.  Here in the emergency department he is alert and oriented x4, GCS is 15.    ED course: vitals signs BP 88/68 mmHg, , RR 29, SpO2 945 on room air, temp 98.9F  Chemistry labs Na 129, K4.6, Cl 93, CO2 24, Glu 192, Alb 1.9, BUN 27, Cr 1.4  WBC 16.47, Hg 11.3, HCT 35, , Mag 1.5, troponin I HS 4.7, lactate 2.3  NTproBNP 2184  EKG tracings, atrial fibrillation, rate 128 bpm, no ST-T wave elevation  Patient received sepsis bolus IVF 30/kg and started on vancomycin and zosyn.     Patient admitted to ICU for continued management of sepsis with diabetic foot infection bilaterally, atrial fibrillation with RVR, CHF exacerbation.  Follow up consult with primary cardiology and primary podiatry.     Telemetry overnight tracings of atrial fibrillation rate >100s bpm. Repeat lactate within normal limits with fluid resuscitation. Patient awake and alert, endorses " Problem: Potential for Falls  Goal: Patient will remain free of falls  Description  INTERVENTIONS:  - Assess patient frequently for physical needs  -  Identify cognitive and physical deficits and behaviors that affect risk of falls    -  [place] fall precautions as indicated by assessment   - Educate patient/family on patient safety including physical limitations  - Instruct patient to call for assistance with activity based on assessment  - Modify environment to reduce risk of injury  - Consider OT/PT consult to assist with strengthening/mobility  Outcome: Progressing     Problem: PAIN - ADULT  Goal: Verbalizes/displays adequate comfort level or baseline comfort level  Description  Interventions:  - Encourage patient to monitor pain and request assistance  - Assess pain using appropriate pain scale  - Administer analgesics based on type and severity of pain and evaluate response  - Implement non-pharmacological measures as appropriate and evaluate response  - Consider cultural and social influences on pain and pain management  - Notify physician/advanced practitioner if interventions unsuccessful or patient reports new pain  Outcome: Progressing     Problem: INFECTION - ADULT  Goal: Absence or prevention of progression during hospitalization  Description  INTERVENTIONS:  - Assess and monitor for signs and symptoms of infection  - Monitor lab/diagnostic results  - Monitor all insertion sites, i e  indwelling lines, tubes, and drains  - Monitor endotracheal (as able) and nasal secretions for changes in amount and color  - [place] appropriate cooling/warming therapies per order  - Administer medications as ordered  - Instruct and encourage patient and family to use good hand hygiene technique  - Identify and instruct in appropriate isolation precautions for identified infection/condition  Outcome: Progressing  Goal: Absence of fever/infection during neutropenic period  Description  INTERVENTIONS:  - Monitor WBC  - Implement neutropenic guidelines  Outcome: Progressing     Problem: SAFETY ADULT  Goal: Maintain or return to baseline ADL function  Description  INTERVENTIONS:  -  Assess patient's ability to carry out ADLs; assess patient's baseline for ADL function and identify physical deficits which impact ability to perform ADLs (bathing, care of mouth/teeth, toileting, grooming, dressing, etc )  - Assess/evaluate cause of self-care deficits   - Assess range of motion  - Assess patient's mobility; develop plan if impaired  - Assess patient's need for assistive devices and provide as appropriate  - Encourage maximum independence but intervene and supervise when necessary  ¯ Involve family in performance of ADLs  ¯ Assess for home care needs following discharge   ¯ Request OT consult to assist with ADL evaluation and planning for discharge  ¯ Provide patient education as appropriate  Outcome: Progressing  Goal: Maintain or return mobility status to optimal level  Description  INTERVENTIONS:  - Assess patient's baseline mobility status (ambulation, transfers, stairs, etc )    - Identify cognitive and physical deficits and behaviors that affect mobility  - Identify mobility aids required to assist with transfers and/or ambulation (gait belt, sit-to-stand, lift, walker, cane, etc )  - [place] fall precautions as indicated by assessment  - Record patient progress and toleration of activity level on Mobility SBAR; progress patient to next Phase/Stage  - Instruct patient to call for assistance with activity based on assessment  - Request Rehabilitation consult to assist with strengthening/weightbearing, etc   Outcome: Progressing     Problem: Knowledge Deficit  Goal: Patient/family/caregiver demonstrates understanding of disease process, treatment plan, medications, and discharge instructions  Description  Complete learning assessment and assess knowledge base    Interventions:  - Provide teaching at level of understanding  - improved tolerance to PO intake of fluids and solids. Continue with broad coverage antibiotics, follow up blood cx x2. Further recommendations appreciated from cardiology and podiatry.        Overview/Hospital Course:  10/8/24 No new complaints. Patient endorses appetite slowly improving. Per cardiology, patient placed on amiodarone gtt overnight to transition to PO. Telemetry tracing, patient remains in atrial fibrillation, intermittent PVCs rate 80-90 bpm. Blood cx x2 positive for MRSA. Estimated Creatinine Clearance: 120.1 mL/min (based on SCr of 1 mg/dL). ID pharm to be consulted. Leukocytosis resolved and stable since IVF and 24 hours of IV abx. Resume glucose control with basal insulin and SSI. Follow up echocardiogram and cardiology recommendations. Back on AC with Eliquis BID. Podiatry plans to post-pone foot surgery until bacteremia clears. Continue with IV antibiotics D3 vancomycin and zosyn. Follow up PT and OT eval and therapy.     Interval History: Patient seen and examined.     Review of Systems   Constitutional:  Positive for activity change and fatigue. Negative for appetite change, chills and fever.   Eyes:  Negative for visual disturbance.   Respiratory:  Negative for cough, chest tightness, shortness of breath and wheezing.    Cardiovascular:  Negative for chest pain, palpitations and leg swelling.   Gastrointestinal:  Negative for abdominal pain, constipation, diarrhea, nausea and vomiting.   Musculoskeletal:  Positive for arthralgias, gait problem and myalgias.   Skin:  Positive for wound.   Neurological:  Positive for weakness. Negative for dizziness, syncope, speech difficulty, light-headedness and headaches.   Psychiatric/Behavioral:  Negative for agitation, behavioral problems, confusion and decreased concentration.      Objective:     Vital Signs (Most Recent):  Temp: 97 °F (36.1 °C) (10/08/24 1145)  Pulse: 93 (10/08/24 1205)  Resp: (!) 23 (10/08/24 1205)  BP: 110/72 (10/08/24 1205)  SpO2:  (!) 94 % (10/08/24 1205) Vital Signs (24h Range):  Temp:  [97 °F (36.1 °C)-97.8 °F (36.6 °C)] 97 °F (36.1 °C)  Pulse:  [] 93  Resp:  [18-26] 23  SpO2:  [77 %-98 %] 94 %  BP: (102-140)/(54-75) 110/72     Weight: (!) 140.6 kg (309 lb 15.5 oz)  Body mass index is 40.9 kg/m².    Intake/Output Summary (Last 24 hours) at 10/8/2024 1225  Last data filed at 10/8/2024 1211  Gross per 24 hour   Intake 3431 ml   Output 2725 ml   Net 706 ml         Physical Exam  Vitals and nursing note reviewed.   Constitutional:       General: He is not in acute distress.     Appearance: He is obese. He is ill-appearing. He is not toxic-appearing.   HENT:      Head: Normocephalic and atraumatic.      Right Ear: External ear normal.      Left Ear: External ear normal.      Nose: Nose normal. No congestion or rhinorrhea.      Mouth/Throat:      Mouth: Mucous membranes are dry.      Pharynx: Oropharynx is clear.   Eyes:      Extraocular Movements: Extraocular movements intact.      Conjunctiva/sclera: Conjunctivae normal.   Cardiovascular:      Rate and Rhythm: Normal rate and regular rhythm.      Pulses: Normal pulses.      Heart sounds: Normal heart sounds. No murmur heard.  Pulmonary:      Effort: Pulmonary effort is normal. No respiratory distress.      Breath sounds: Normal breath sounds. No wheezing or rales.   Abdominal:      General: Abdomen is flat. There is no distension.      Palpations: Abdomen is soft. There is no mass.      Tenderness: There is no abdominal tenderness. There is no right CVA tenderness, left CVA tenderness or guarding.   Musculoskeletal:         General: Normal range of motion.      Cervical back: Normal range of motion and neck supple. No rigidity.      Comments: Both feet wrapped in ace bandage, dressing clean and dry   Skin:     General: Skin is warm and dry.      Capillary Refill: Capillary refill takes less than 2 seconds.   Neurological:      General: No focal deficit present.      Mental Status: He is  Provide teaching via preferred learning methods  Outcome: Progressing     Problem: DISCHARGE PLANNING  Goal: Discharge to home or other facility with appropriate resources  Description  INTERVENTIONS:  - Identify barriers to discharge w/patient and caregiver  - Arrange for needed discharge resources and transportation as appropriate  - Identify discharge learning needs (meds, wound care, etc )  - Arrange for interpretive services to assist at discharge as needed  - Refer to Case Management Department for coordinating discharge planning if the patient needs post-hospital services based on physician/advanced practitioner order or complex needs related to functional status, cognitive ability, or social support system  Outcome: Progressing     Problem: POSTPARTUM  Goal: Experiences normal postpartum course  Description  INTERVENTIONS:  - Monitor maternal vital signs  - Assess uterine involution and lochia  Outcome: Progressing  Goal: Appropriate maternal -  bonding  Description  INTERVENTIONS:  - Identify family support  - Assess for appropriate maternal/infant bonding   -Encourage maternal/infant bonding opportunities  - Referral to  or  as needed  Outcome: Progressing  Goal: Establishment of infant feeding pattern  Description  INTERVENTIONS:  - Assess breast/bottle feeding  - Refer to lactation as needed  Outcome: Progressing  Goal: Incision(s), wounds(s) or drain site(s) healing without S/S of infection  Description  INTERVENTIONS  - Assess and document risk factors for skin impairment   - Assess and document dressing, incision, wound bed, drain sites and surrounding tissue  - Initiate Nutrition services consult and/or wound management as needed  Outcome: Progressing "alert and oriented to person, place, and time.      Motor: No weakness.      Gait: Gait normal.   Psychiatric:         Mood and Affect: Mood normal.         Behavior: Behavior normal.             Significant Labs: All pertinent labs within the past 24 hours have been reviewed.  A1C:   Recent Labs   Lab 05/23/24  1505 10/06/24  1355   HGBA1C 5.5 7.8*     ABGs: No results for input(s): "PH", "PCO2", "HCO3", "POCSATURATED", "BE", "TOTALHB", "COHB", "METHB", "O2HB", "POCFIO2", "PO2" in the last 48 hours.  Bilirubin:   Recent Labs   Lab 10/06/24  1346 10/07/24  0354   BILITOT 1.0 0.7     Blood Culture:   Recent Labs   Lab 10/06/24  1346   LABBLOO Gram stain lizzy bottle: Gram positive cocci in clusters resembling Staph  Results called to and read back by: Chelita Fleming RN 10/07/2024 09:38  Gram stain aer bottle: Gram positive cocci in clusters resembling Staph  10/07/2024  12:42  STAPHYLOCOCCUS AUREUS  Susceptibility pending  ID consult required at [facility] and [place] locations.  *  Gram stain lizzy bottle: Gram positive cocci in clusters resembling Staph  Results called to and read back by: Chelita Fleming RN 10/07/2024 09:38  Gram stain aer bottle: Gram positive cocci in clusters resembling Staph  Gram stain aer bottle: Gram positive cocci in chains resembling Strep  Results called to and read back by:Chelita Fleming RN 10/07/2024  18:08  STAPHYLOCOCCUS AUREUS  ID consult required at [facility] [place].  For susceptibility see order #[id number]  *     BMP:   Recent Labs   Lab 10/06/24  1346 10/07/24  0354 10/08/24  1030   *   < > 248*   *   < > 134*   K 4.6   < > 4.3   CL 93*   < > 102   CO2 24   < > 26   BUN 27*   < > 29*   CREATININE 1.4   < > 1.0   CALCIUM 9.1   < > 8.4*   MG 1.5*  --   --     < > = values in this interval not displayed.     CBC:   Recent Labs   Lab 10/06/24  1346 10/07/24  0354 10/08/24  1030   WBC 16.47* 11.74 6.62   HGB 11.3* 10.1* 10.2* "   HCT 35.1* 32.0* 31.4*   * 396 377     CMP:   Recent Labs   Lab 10/06/24  1346 10/07/24  0354 10/08/24  1030   * 134* 134*   K 4.6 4.5 4.3   CL 93* 101 102   CO2 24 26 26   * 172* 248*   BUN 27* 28* 29*   CREATININE 1.4 1.0 1.0   CALCIUM 9.1 8.9 8.4*   PROT 7.5 6.6  --    ALBUMIN 1.9* 1.6*  --    BILITOT 1.0 0.7  --    ALKPHOS 109 92  --    AST 18 14  --    ALT 17 16  --    ANIONGAP 12* 7 6      Recent Labs   Lab 10/06/24  1346   MG 1.5*     POCT Glucose:   Recent Labs   Lab 10/07/24  2039 10/08/24  0527 10/08/24  1050   POCTGLUCOSE 256* 212* 266*     X-Ray Foot Complete Left  Narrative: EXAMINATION:  XR FOOT COMPLETE 3 VIEW LEFT    CLINICAL HISTORY:  Chronic wound hallux;    FINDINGS:  Previous resection 3rd phalanx.  Fusion interphalangeal joint 1st digit.  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  Joint space narrowing and osteophytes within the tarsal bones consistent with Charcot joint/degenerative change.  Moderate vascular calcifications.  Impression: Chronic changes as above.  No acute findings.    Electronically signed by: Jeison Mckinney MD  Date:    10/07/2024  Time:    15:52    X-Ray Foot Complete Right  Narrative: EXAMINATION:  XR FOOT COMPLETE 3 VIEW RIGHT    CLINICAL HISTORY:  Chronic wound, plantar midfoot; hx of Charcot;    FINDINGS:  Previous resection distal 1st metatarsal.  Tarsal of bony destruction consistent with a Charcot joint.  No acute fracture seen.  Mid plantar soft tissue wound and diffuse soft tissue swelling.  Scattered vascular calcifications.  Impression: 1. Plantar soft tissue wound and diffuse soft tissue swelling.  2. Charcot joint and resection distal 1st metatarsal.    Electronically signed by: Jeison Mckinney MD  Date:    10/07/2024  Time:    15:51     Significant Imaging: I have reviewed all pertinent imaging results/findings within the past 24 hours.    Assessment/Plan:      * Bacteremia due to methicillin resistant Staphylococcus aureus  Lab Results    Component Value Date    LABBLOO  10/06/2024     Gram stain lizzy bottle: Gram positive cocci in clusters resembling Staph    LABBLOO  10/06/2024     Results called to and read back by: Chelita Fleming RN 10/07/2024 09:38    LABBLOO  10/06/2024     Gram stain aer bottle: Gram positive cocci in clusters resembling Staph    LABBLOO  10/06/2024     Gram stain aer bottle: Gram positive cocci in chains resembling Strep    LABBLOO  10/06/2024     Results called to and read back by:Chelita Fleming RN 10/07/2024  18:08    LABBLOO (A) 10/06/2024     STAPHYLOCOCCUS AUREUS  ID consult required at [facility].  For susceptibility see order #[id number]      LABBLOO  10/06/2024     Gram stain lizzy bottle: Gram positive cocci in clusters resembling Staph    LABBLOO  10/06/2024     Results called to and read back by: Chelita Fleming RN 10/07/2024 09:38    LABBLOO  10/06/2024     Gram stain aer bottle: Gram positive cocci in clusters resembling Staph    LABBLOO 10/07/2024  12:42 10/06/2024    LABBLOO (A) 10/06/2024     STAPHYLOCOCCUS AUREUS  Susceptibility pending  ID consult required at [facility] and [facility].        Past history of MSSA bacteremia.   Chronic diabetic foot wound bilaterally.   MDRO acinetobacter positive (9/4/24) via wound culture per podiatry outpatient.   Leukocytosis resolved. Afebrile overnight.  10/8/24 Positive blood cx x2 MRSA    - continue  zosyn and vancomycin  - follow up echocardiogram  - follow up ID pharm        MRSA bacteremia  Blood cx x2 positive PCR for MRSA. D3 vancomycin, zosyn. Follow up final culture results. Follow up echocardiogram.      Diabetic foot infection  Patient's FSGs are uncontrolled due to hyperglycemia on current medication regimen.  Last A1c reviewed-   Lab Results   Component Value Date    HGBA1C 7.8 (H) 10/06/2024     Most recent fingerstick glucose reviewed-   Recent Labs   Lab 10/07/24  1647 10/07/24  2039 10/08/24  0527  "10/08/24  1050   POCTGLUCOSE 250* 256* 212* 266*     Current correctional scale  Low  Maintain anti-hyperglycemic dose as follows-   Antihyperglycemics (From admission, onward)      Start     Stop Route Frequency Ordered    10/08/24 0830  insulin glargine U-100 (Lantus) pen 20 Units         -- SubQ Daily 10/08/24 0820    10/06/24 1640  insulin aspart U-100 pen 0-5 Units         -- SubQ Before meals & nightly PRN 10/06/24 1640          Hold Oral hypoglycemics while patient is in the hospital.    Elevated lactic acid level  With mild elevated procalcitonin.   Resolved after IVF hydration.   Continue with above management with source control; positive MRSA bacteremia    Weakness  Secondary to infectious process, poor control of diabetes, poor nutritional status and chronic anemia.   Hg/HCT 10.1/32.   Continue with IV antibiotics, encourage PO hydration.   Continue management of chronic conditions.        Severe sepsis  This patient does have evidence of infective focus  My overall impression is sepsis.  Source: Skin and Soft Tissue (location bilateral diabetic foot wound)  Antibiotics given-   Antibiotics (72h ago, onward)      Start     Stop Route Frequency Ordered    10/08/24 0015  vancomycin 2 g in 0.9% sodium chloride 500 mL IVPB         -- IV Every 12 hours (non-standard times) 10/07/24 2301    10/07/24 1245  mupirocin 2 % ointment         10/12/24 0859 Nasl 2 times daily 10/07/24 1138    10/06/24 2230  piperacillin-tazobactam (ZOSYN) 4.5 g in D5W 100 mL IVPB (MB+)         -- IV Every 8 hours (non-standard times) 10/06/24 1640    10/06/24 1640  vancomycin - pharmacy to dose  (vancomycin IVPB (PEDS and ADULTS))        Placed in "And" Linked Group    -- IV pharmacy to manage frequency 10/06/24 1640          Latest lactate reviewed-  Recent Labs   Lab 10/06/24  1721   LACTATE 1.1       Organ dysfunction indicated by Acute heart failure    Fluid challenge Ideal Body Weight- The patient's ideal body weight is Ideal " body weight: 79.9 kg (176 lb 2.4 oz) which will be used to calculate fluid bolus of 30 ml/kg for treatment of septic shock.      Post- resuscitation assessment Yes Perfusion exam was performed within 6 hours of septic shock presentation after bolus shows Adequate tissue perfusion assessed by non-invasive monitoring       Will Not start Pressors- Levophed for MAP of 65  Source control achieved by: IVF resuscitation, IV antibiotics    Ulcer of toe of left foot, with fat layer exposed  Contributing source of infection. Blood cx x2 positive.   Continue with IV antibiotics, zosyn and vancomycin  - f/u podiatry consult  - continue wound care per podiatry     PAD (peripheral artery disease)  Continue with statin and aspirin.       Atrial fibrillation with rapid ventricular response  Patient has paroxysmal (<7 days) atrial fibrillation. Patient is currently in atrial fibrillation. [id number] Score: 2. The patients heart rate in the last 24 hours is as follows:  Pulse  Min: 82  Max: 102     Antiarrhythmics  metoprolol succinate (TOPROL-XL) 24 hr tablet 100 mg, Daily, Oral  amiodarone 360 mg/200 mL (1.8 mg/mL) infusion, Continuous, Intravenous  amiodarone tablet 200 mg, Daily, Oral  Anticoagulants  apixaban tablet 5 mg, 2 times daily, Oral    Per cardiology, trasition from amiodarone gtt to PO amiodarone.   Plan  - Replete lytes with a goal of K>4, Mg >2  - Patient's afib is currently controlled  - eliquis BID  - f/u cardiology recommendations    Type 2 diabetes mellitus, with long-term current use of insulin  Hold oral antihyperglycemics.   Lab Results   Component Value Date    HGBA1C 7.8 (H) 10/06/2024    HGBA1C 5.5 05/23/2024    HGBA1C 5.4 01/16/2024   Poorly controlled compared to 6 months ago.   - lantus 20 U daily  - SSI QACHS  - consult nutritionist          Hyperlipidemia  Continue with home statin.       Essential hypertension  Patients blood pressure range in the last 24 hours was: BP  Min: 88/68  Max: 171/71.The  patient's inpatient anti-hypertensive regimen is listed below:    Current Antihypertensives  metoprolol succinate (TOPROL-XL) 24 hr tablet 100 mg, Daily, Oral  losartan tablet 50 mg, Daily, Oral  Denies symptoms.  Plan  - BP is controlled, no changes needed to their regimen  - continue with current antihypertensive regimen  - follow up consult cardiology    Class 3 severe obesity with serious comorbidity and body mass index (BMI) of 40.0 to 44.9 in adult  Body mass index is 40.9 kg/m². Morbid obesity complicates all aspects of disease management from diagnostic modalities to treatment. Weight loss encouraged and health benefits explained to patient.     Wt Readings from Last 3 Encounters:   10/08/24 (!) 140.6 kg (309 lb 15.5 oz)   05/23/24 (!) 141.1 kg (311 lb)   03/04/24 (!) 136.5 kg (300 lb 14.9 oz)          VTE Risk Mitigation (From admission, onward)           Ordered     apixaban tablet 5 mg  2 times daily         10/07/24 1134     IP VTE LOW RISK PATIENT  Once         10/06/24 1640                    Discharge Planning   MICHAEL:      Code Status: Full Code   Is the patient medically ready for discharge?:     Reason for patient still in hospital (select all that apply): Patient new problem, Patient trending condition, Imaging, Consult recommendations, and PT / OT recommendations  Discharge Plan A: Home with family, Home Health                  Olena Thornton DO  Department of Hospital Medicine   [place] [facility]

## 2024-10-16 ENCOUNTER — OFFICE VISIT (OUTPATIENT)
Dept: DENTISTRY | Facility: CLINIC | Age: 34
End: 2024-10-16

## 2024-10-16 VITALS — DIASTOLIC BLOOD PRESSURE: 74 MMHG | SYSTOLIC BLOOD PRESSURE: 110 MMHG | HEART RATE: 106 BPM

## 2024-10-16 DIAGNOSIS — K02.9 CARIES: Primary | ICD-10-CM

## 2024-10-16 PROCEDURE — D0274 BITEWINGS - 4 RADIOGRAPHIC IMAGES: HCPCS

## 2024-10-16 PROCEDURE — D0150 COMPREHENSIVE ORAL EVALUATION - NEW OR ESTABLISHED PATIENT: HCPCS

## 2024-10-16 NOTE — DENTAL PROCEDURE DETAILS
"Comprehensive Exam    Julio Waters 34 y.o. female presents with self to [facility] for comprehensive exam.  PMH reviewed, no changes, ASA II. Significant medical history: NSF. Significant allergies: NKA. Significant medications: NSF.  Pain level 0/10    Chief complaint:   \"I have a broken filling on my lower left\"    Consent:  Reviewed procedures involved with comprehensive exam including radiographs, oral exam, and periodontal probing.   Patient understands and consent was given by self via verbal consent.    Radiographs: 4 BWs.    Oral cancer screening: normal.  Extraoral exam: no remarkable findings.  Intraoral exam: gingival inflammation, supragingival calculus, missing teeth, caries     Periodontal exam:  Hygiene - Poor.  Plaque - Moderate.  Horizontal bone loss -  UR: Moderate (15-33%).  UL: Moderate (15-33%).  LL: Moderate (15-33%).  LR: Moderate (15-33%).  Vertical bone loss - None.  Subgingival calculus - Generalized.  BOP - Generalized.  Mobility - None.  Furcation involvements - None.  Occlusal trauma - None.  Smoker - No.  Diabetic - No.  Periodontal Stage: II.  Periodontal Grade: B.  Periodontal Plan: SRP: UL, UR, LL, and LR.    Caries exam:   Caries detected: #s 2 O, 3 DO, 7 ML, 8 DL, 10 DL ML, 11 DL ML, 19 DO 20 DO, 29 MO  Teeth with elevated chance of needing RCT: #19 ( planned for crown due to large fx of restorative material  Likely nonrestorable teeth: None    Occlusal assessment:  VDO / restorative space - Normal.  AP Classification -  Right: Canine Class I  Left: Canine Class I  Overbite - 20%.  Overjet -  2 mm.  Supra-eruptions or drifting - #s 2,3, 14, 17 and 32.  Crossbites - None.  Recommended for orthodontic referral? No.  Recommended for orthodontic consult at [facility]? No (pt not interested)      Tx plan:  Start resins   When preauth is back, SRP LL quad  After SRP LL quad, start #19 crown (pt is symptomatic on tooth- cannot drink cold liquids)      Referral(s): None needed.  Rx: " None.  Recommended recall schedule: 3 months.      Patient dismissed ambulatory and alert.    NV: #2O and #3 DO    Attending: Dr. Greene was present in clinic

## 2024-11-14 ENCOUNTER — OFFICE VISIT (OUTPATIENT)
Dept: DENTISTRY | Facility: CLINIC | Age: 34
End: 2024-11-14

## 2024-11-14 VITALS — SYSTOLIC BLOOD PRESSURE: 109 MMHG | HEART RATE: 93 BPM | DIASTOLIC BLOOD PRESSURE: 73 MMHG

## 2024-11-14 DIAGNOSIS — K05.6 PERIODONTAL DISEASE: Primary | ICD-10-CM

## 2024-11-14 PROCEDURE — D0220 INTRAORAL - PERIAPICAL FIRST RADIOGRAPHIC IMAGE: HCPCS

## 2024-11-14 PROCEDURE — D0140 LIMITED ORAL EVALUATION - PROBLEM FOCUSED: HCPCS

## 2024-11-14 NOTE — PROGRESS NOTES
Procedure Details  15  - INTRAORAL - PERIAPICAL FIRST RADIOGRAPHIC IMAGE   - LIMITED ORAL EVALUATION - PROBLEM FOCUSED    Limited Exam    Julio Waters 34 y.o. female presents with self to [facility] for Limited exam  PMH reviewed, no changes, ASA I.     Chief complaint:  I am having pain, it comes and goes    Consent:  Discussed that limited exam focuses on problem area, and same day tx is not guaranteed.  Patient explained to if they wish to have anything else evaluated, they need to return to the practice at which they are a patient of record or schedule a comprehensive exam afterwards.  Patient understands and consent was given by self via verbal consent.    Subjective history:    Onset: a few weeks ago.   Provocation: Biting, Chewing, Spontaneous.   Quality: Achy, Dull.   Region: UL.   Severity: 8/10.   Timing: comes and goes.    Objective clinical findings:   Oral cancer screening: normal.   Extraoral exam: no remarkable findings.  Intraoral exam: no remarkable findings.     Radiographs: Single PA - #15 .     Assessment:  #15 mobility of 1   Sub-gingival calculus  Probings of 6-8mm    Plan:   Limited exam completed. Explained to patient the pain is experiencing is due to perio. Her gums are inflamed. Anesthetized via infiltration w/ 2% lidocaine w/ 1:100k epi. Used cavitron to clean supra and sub-gingival calculus.     Referral(s): None needed.  Rx: None.  Comprehensive care disposition:  Patient of record .    Patient dismissed ambulatory and alert.    NV: SRPs.    Attending: Dr. Mendoza was present in clinic.

## 2024-11-27 ENCOUNTER — OFFICE VISIT (OUTPATIENT)
Dept: DENTISTRY | Facility: CLINIC | Age: 34
End: 2024-11-27

## 2024-11-27 VITALS — HEART RATE: 68 BPM | DIASTOLIC BLOOD PRESSURE: 66 MMHG | SYSTOLIC BLOOD PRESSURE: 93 MMHG

## 2024-11-27 DIAGNOSIS — Z01.21 ENCOUNTER FOR DENTAL EXAMINATION AND CLEANING WITH ABNORMAL FINDINGS: Primary | ICD-10-CM

## 2024-11-27 PROCEDURE — D4341 PERIODONTAL SCALING AND ROOT PLANING - 4 OR MORE TEETH PER QUADRANT: HCPCS

## 2024-11-27 NOTE — DENTAL PROCEDURE DETAILS
SCALE AND RP LL   Local anesthesia administered by Ashlyn Myers [facility]     2 carpule/s given - 4% Septocaine 1:100K epi infiltrations  CHIEF CONCERN: none   PAIN SCALE: 5  ASA CLASS: ASA 1 - Normal health patient  PLAQUE: heavy  CALCULUS: Heavy  BLEEDING: moderate  STAIN : Generalized  PERIO:     Hand scaled- supra/subgingival  Used cavitron    Oral Hygiene Instruction:  recommended brushing 2 x daily for 2 minutes MIN, recommended flossing daily, reviewed dietary precautions. Post op sc/rp instructions placed in AVS, printed and handed/ reviewed with patient.     Soft tissue exam:  soft tissue exam was normal  ExtraOral exam:   Extraoral exam was normal    REFERRALS: no referrals provided         NEXT VISIT:   --->Restorative- crown #19, fillings 2,3 and remaining quads of scrp     Last BWX: 10/16/2024  Last Panorex/ FMX :0

## 2024-11-27 NOTE — PROGRESS NOTES
Procedure Details    - PERIODONTAL SCALING AND ROOT PLANING - 4 OR MORE TEETH PER QUADRANT    SCALE AND RP LL   Local anesthesia administered by Ashlyn Myers [facility]     2 carpule/s given - 4% Septocaine 1:100K epi infiltrations  CHIEF CONCERN: none   PAIN SCALE: 5  ASA CLASS: ASA 1 - Normal health patient  PLAQUE: heavy  CALCULUS: Heavy  BLEEDING: moderate  STAIN : Generalized  PERIO:     Hand scaled- supra/subgingival  Used cavitron    Oral Hygiene Instruction:  recommended brushing 2 x daily for 2 minutes MIN, recommended flossing daily, reviewed dietary precautions. Post op sc/rp instructions placed in AVS, printed and handed/ reviewed with patient.     Soft tissue exam:  soft tissue exam was normal  ExtraOral exam:   Extraoral exam was normal    REFERRALS: no referrals provided         NEXT VISIT:   --->Restorative- crown #19, fillings 2,3 and remaining quads of scrp     Last BWX: 10/16/2024  Last Panorex/ FMX :0

## 2024-12-02 ENCOUNTER — OFFICE VISIT (OUTPATIENT)
Dept: DENTISTRY | Facility: CLINIC | Age: 34
End: 2024-12-02

## 2024-12-02 VITALS — DIASTOLIC BLOOD PRESSURE: 62 MMHG | SYSTOLIC BLOOD PRESSURE: 93 MMHG

## 2024-12-02 DIAGNOSIS — K05.4 PERIODONTOSIS: Primary | ICD-10-CM

## 2024-12-02 PROCEDURE — D4341 PERIODONTAL SCALING AND ROOT PLANING - 4 OR MORE TEETH PER QUADRANT: HCPCS

## 2024-12-02 NOTE — DENTAL PROCEDURE DETAILS
SCALE AND RP UL   Pt arrived at [facility] for SRP apt. Pt requested I clean first UL due to pain there  Applied topical gel Benzocaine 20% &   1 carpule/s given - 4% Septocaine 1:100K epi infiltrations also applied Gingicaine  CHIEF CONCERN: pain UL keeping her up at night    ASA CLASS: ASA 2 - Patient with mild systemic disease with no functional limitations  Noticed #15 mesidal 5-6mm but also decay under large amalgam there.   Heavy plaque and stain. Strongly recommended OB electric tb, floss and Listerine.   Hand scaled, polished and flossed. Used cavitron  Pt kept gagging on anesthesia and chlorhexidine. Can't tolerate bad tastes.   Oral Hygiene Instruction:  recommended brushing 2 x daily for 2 minutes MIN, recommended flossing daily, reviewed dietary precautions. Post op sc/rp instructions placed in AVS, printed and handed/ reviewed with patient.     Soft tissue exam:  soft tissue exam was normal  ExtraOral exam:   Extraoral exam was normal  Dr BELTRAN evaluated #15 due to my findings and determined there is decay under amalgam filling-explained to pt. We'll remove filling first then decide if it can be restored or if it needs ext.       REFERRALS: no referrals provided  Post op subgingival irrigation Chlorhexidine. Pt disliked.   Pt felt dizzy after trt. Made sit a while til felt better         NEXT VISIT:   --  NV Rest #15 FILLING/FRACTURE/EXTRACTION  SRP UR  SRP LR  rests      Last BWX:   Last Panorex/ FMX :

## 2024-12-02 NOTE — PROGRESS NOTES
Procedure Details  UL  - PERIODONTAL SCALING AND ROOT PLANING - 4 OR MORE TEETH PER QUADRANT    SCALE AND RP UL   Pt arrived at [facility] for SRP apt. Pt requested I clean first UL due to pain there  Applied topical gel Benzocaine 20% &   1 carpule/s given - 4% Septocaine 1:100K epi infiltrations also applied Gingicaine  CHIEF CONCERN: pain UL keeping her up at night    ASA CLASS: ASA 2 - Patient with mild systemic disease with no functional limitations  Noticed #15 mesidal 5-6mm but also decay under large amalgam there.   Heavy plaque and stain. Strongly recommended OB electric tb, floss and Listerine.   Hand scaled, polished and flossed. Used cavitron  Pt kept gagging on anesthesia and chlorhexidine. Can't tolerate bad tastes.   Oral Hygiene Instruction:  recommended brushing 2 x daily for 2 minutes MIN, recommended flossing daily, reviewed dietary precautions. Post op sc/rp instructions placed in AVS, printed and handed/ reviewed with patient.     Soft tissue exam:  soft tissue exam was normal  ExtraOral exam:   Extraoral exam was normal  Dr BELTRAN evaluated #15 due to my findings and determined there is decay under amalgam filling-explained to pt. We'll remove filling first then decide if it can be restored or if it needs ext.       REFERRALS: no referrals provided  Post op subgingival irrigation Chlorhexidine. Pt disliked.   Pt felt dizzy after trt. Made sit a while til felt better         NEXT VISIT:   --  NV Rest #15 FILLING/FRACTURE/EXTRACTION  SRP UR  SRP LR  rests      Last BWX:   Last Panorex/ FMX :

## 2024-12-03 ENCOUNTER — OFFICE VISIT (OUTPATIENT)
Dept: DENTISTRY | Facility: CLINIC | Age: 34
End: 2024-12-03

## 2024-12-03 VITALS — SYSTOLIC BLOOD PRESSURE: 99 MMHG | HEART RATE: 98 BPM | DIASTOLIC BLOOD PRESSURE: 69 MMHG

## 2024-12-03 DIAGNOSIS — K05.6 PERIODONTAL DISEASE: Primary | ICD-10-CM

## 2024-12-03 PROCEDURE — D4341 PERIODONTAL SCALING AND ROOT PLANING - 4 OR MORE TEETH PER QUADRANT: HCPCS

## 2024-12-03 NOTE — DENTAL PROCEDURE DETAILS
SCALE AND RP UR   Local anesthesia administered by Ulises    1 carpule/s given - 20% Bencocaine topical used and 2% Lidocaine 1:100K epi infiltration  CHIEF CONCERN: Patient was concerned wit the injection that administered today and feeling of being sick following Chlor Gluc rinse yesterday.  PAIN SCALE: 0  ASA CLASS: ASA 2 - Patient with mild systemic disease with no functional limitations  PLAQUE: moderate  CALCULUS: Generalized  Heavy  BLEEDING: moderate  STAIN : Localized  Moderate      Hand scaled, used cavitron and irrigated with Chlor Gluconate     Oral Hygiene Instruction:  recommended brushing 2 x daily for 2 minutes MIN, recommended flossing daily, reviewed dietary precautions. Post op sc/rp instructions placed in AVS, printed and handed/ reviewed with patient.     Soft tissue exam:  soft tissue exam was normal  ExtraOral exam:   Extraoral exam was normal    REFERRALS: no referrals provided         NEXT VISIT:   --->sc/rp LR

## 2024-12-03 NOTE — PROGRESS NOTES
Procedure Details  UR  - PERIODONTAL SCALING AND ROOT PLANING - 4 OR MORE TEETH PER QUADRANT    SCALE AND RP UR   Local anesthesia administered by [facility]    1 carpule/s given - 20% Bencocaine topical used and 2% Lidocaine 1:100K epi infiltration  CHIEF CONCERN: Patient was concerned wit the injection that administered today and feeling of being sick following Chlor Gluc rinse yesterday.  PAIN SCALE: 0  ASA CLASS: ASA 2 - Patient with mild systemic disease with no functional limitations  PLAQUE: moderate  CALCULUS: Generalized  Heavy  BLEEDING: moderate  STAIN : Localized  Moderate      Hand scaled, used cavitron and irrigated with Chlor Gluconate     Oral Hygiene Instruction:  recommended brushing 2 x daily for 2 minutes MIN, recommended flossing daily, reviewed dietary precautions. Post op sc/rp instructions placed in AVS, printed and handed/ reviewed with patient.     Soft tissue exam:  soft tissue exam was normal  ExtraOral exam:   Extraoral exam was normal    REFERRALS: no referrals provided         NEXT VISIT:   --->sc/rp LR

## 2024-12-04 ENCOUNTER — OFFICE VISIT (OUTPATIENT)
Dept: DENTISTRY | Facility: CLINIC | Age: 34
End: 2024-12-04

## 2024-12-04 DIAGNOSIS — K02.9 CARIES INVOLVING MULTIPLE SURFACES OF TOOTH: Primary | ICD-10-CM

## 2024-12-04 PROCEDURE — D2393 RESIN-BASED COMPOSITE - 3 SURFACES, POSTERIOR: HCPCS

## 2024-12-05 NOTE — PROGRESS NOTES
Composite Restoration #15-MOL    Julio Waters 34 y.o. female presents with self to [facility] for composite restoration  PMH reviewed, no changes, ASA II. Significant medical history: Pt is currently breastfeeding. Significant allergies: NKDA. Significant medications: Reviewed with pt.    Diagnosis:  Caries #15-MOL    Prognosis:  poor    Consent:  Risks of specific procedure: need for RCT if pulp exposure occurs or in future if pulp is inflamed, need to revise tx plan based on extent of decay, damage to adjacent tooth and/or restoration.  Risks of any dental procedure: post procedural pain or sensitivity, local anesthetic side effects, allergic reaction to dental materials and medications, breakage of local anesthetic needle, aspiration of small dental tools, injury to nearby hard and soft tissues and anatomical structures.  Benefits: prevent further breakdown of tooth and its sequelae.  Alternatives: EXT, no tx.  Tx plan for composite restoration #15 reviewed. Opportunity to ask questions given, all questions answered to degree of medical and dental certainty.  Patient understands and consent given by self via verbal consent.    Anesthesia:  Topical 20% benzocaine.  1 carps 2% Lidocaine 1:100k epi via buccal infiltration and palatal/lingual infiltration.    Procedure details:  Isolation: cotton rolls, dry angles, and high volume suction  Prepped teeth #15 with high speed handpiece.  Caries removed with round carbide on slow speed.  Caries indicator placed on tooth structure to identify any fracture of root. No fracture of tooth observed, but informed pt of deep decay extending close in proximity to root canals. Explained potential that although all decay was removed that can be observed, I am unable to determine if bacteria was present in the canals. Potential that root canal therapy may fail, and extraction may be indicated. Pt decided to proceed with resin filling, but understands if symptoms occur or signs of  infection clinically/radiographically, tooth #15 may need extraction in the future. Pt understood and consented to the resin composite filling.  Band placement: Tofflemire matrix.   Etch with 37% H2PO4 15 seconds. Rinsed and suctioned.  Applied  with 20 second scrub, air dried, and light cured.  Restored with packable (A2 shade) and light cured.  Checked occlusion and adjusted with finishing burs.  Checked contacts with floss  Polished with enhance point.  Verified occlusion and contacts.    Patient dismissed ambulatory and alert.    NV: [place] [facility]/Impression #19.    Attending: Dr. Mendoza was present in clinic.

## 2024-12-09 ENCOUNTER — OFFICE VISIT (OUTPATIENT)
Dept: DENTISTRY | Facility: CLINIC | Age: 34
End: 2024-12-09

## 2024-12-09 DIAGNOSIS — K05.6 PERIODONTAL DISEASE: Primary | ICD-10-CM

## 2024-12-09 PROCEDURE — D4341 PERIODONTAL SCALING AND ROOT PLANING - 4 OR MORE TEETH PER QUADRANT: HCPCS

## 2024-12-09 NOTE — DENTAL PROCEDURE DETAILS
SCALE AND RP LR   Local anesthesia administered by Dr. Montejo    2 carpule/s given - 20% Bencocaine topical used and 2% Lidocaine 1:100K epi MADDY block right followed up with Carbocaine 3% No Epi  CHIEF CONCERN: none   PAIN SCALE: 0  ASA CLASS: ASA 2 - Patient with mild systemic disease with no functional limitations  PLAQUE: moderate  CALCULUS: Generalized  Light  BLEEDING: moderate  STAIN : Generalized  Light    Hand scaled, used cavitron and irrigated with Chlor Gluconate 0.12%    Oral Hygiene Instruction:  recommended brushing 2 x daily for 2 minutes MIN, recommended flossing daily, reviewed dietary precautions. Post op sc/rp instructions placed in AVS, printed and handed/ reviewed with patient.     Soft tissue exam:  soft tissue exam was normal  ExtraOral exam:   Extraoral exam was normal    REFERRALS: no referrals provided         NEXT VISIT:   --->3 Month Perio Maintenance

## 2024-12-09 NOTE — PROGRESS NOTES
Procedure Details  LR  - PERIODONTAL SCALING AND ROOT PLANING - 4 OR MORE TEETH PER QUADRANT    SCALE AND RP LR   Local anesthesia administered by Dr. Montejo    2 carpule/s given - 20% Bencocaine topical used and 2% Lidocaine 1:100K epi MADDY block right followed up with Carbocaine 3% No Epi  CHIEF CONCERN: none   PAIN SCALE: 0  ASA CLASS: ASA 2 - Patient with mild systemic disease with no functional limitations  PLAQUE: moderate  CALCULUS: Generalized  Light  BLEEDING: moderate  STAIN : Generalized  Light    Hand scaled, used cavitron and irrigated with Chlor Gluconate 0.12%    Oral Hygiene Instruction:  recommended brushing 2 x daily for 2 minutes MIN, recommended flossing daily, reviewed dietary precautions. Post op sc/rp instructions placed in AVS, printed and handed/ reviewed with patient.     Soft tissue exam:  soft tissue exam was normal  ExtraOral exam:   Extraoral exam was normal    REFERRALS: no referrals provided         NEXT VISIT:   --->3 Month Perio Maintenance

## 2024-12-09 NOTE — PROGRESS NOTES
I supervised the Advanced Practitioner on . I reviewed the Advanced Practitioner note and agree.    Whit Merrill, DMD 12/09/24

## 2024-12-23 ENCOUNTER — OFFICE VISIT (OUTPATIENT)
Dept: DENTISTRY | Facility: CLINIC | Age: 34
End: 2024-12-23

## 2024-12-23 VITALS — SYSTOLIC BLOOD PRESSURE: 106 MMHG | DIASTOLIC BLOOD PRESSURE: 70 MMHG | HEART RATE: 90 BPM

## 2024-12-23 DIAGNOSIS — K08.50 DEFECTIVE DENTAL RESTORATION: Primary | ICD-10-CM

## 2024-12-23 PROCEDURE — WIS5005 REMAKE

## 2024-12-23 PROCEDURE — D2393 RESIN-BASED COMPOSITE - 3 SURFACES, POSTERIOR: HCPCS

## 2024-12-23 NOTE — PROGRESS NOTES
Procedure Details  19 MOD  - RESIN-BASED COMPOSITE - 3 SURFACES, POSTERIOR  [id number] - REMAKE    Composite Restoration #19-MOD    Julio Waters 34 y.o. female presents with self to [facility] for composite restoration  PMH reviewed, no changes, ASA II. Significant medical history: Hx of breastfeeding. Significant allergies: NKDA. Significant medications: Reviewed with pt.    Diagnosis:  Fracture of distal composite #19, caries mesial; Tx planned for MOD.     Prognosis:  Fair    Consent:  Risks of specific procedure: need for RCT if pulp exposure occurs or in future if pulp is inflamed, need to revise tx plan based on extent of decay, damage to adjacent tooth and/or restoration.  Risks of any dental procedure: post procedural pain or sensitivity, local anesthetic side effects, allergic reaction to dental materials and medications, breakage of local anesthetic needle, aspiration of small dental tools, injury to nearby hard and soft tissues and anatomical structures.  Benefits: prevent further breakdown of tooth and its sequelae.  Alternatives: Crown, no tx.  Tx plan for composite restoration #19 reviewed. Opportunity to ask questions given, all questions answered to degree of medical and dental certainty.  Patient understands and consent given by self via verbal consent.    Anesthesia:  Topical 20% benzocaine.  1.5 carps 2% Lidocaine 1:100k epi via MADDY block, buccal infiltration, and palatal/lingual infiltration.    Procedure details:  Isolation: DryShield   Prepped teeth #19 with high speed handpiece.  Caries removed with round carbide on slow speed.  Decision was made to complete indirect pulp capping, as shadowing of pulp could be observed from pulpal floor.   Band placement: Tofflemire matrix and wedge.   Biodentine XP was placed on the pulpal floor and dried for 5 minutes.  Next, Glass Ionomer was used to restore the tooth.   Once set, checked occlusion and adjusted with finishing burs.  Checked contacts with  floss  Verified occlusion and contacts.    Patient dismissed ambulatory and alert.    NV: Continue resin fillings, #2-O and #3-DO.    Continue to monitor #19 for endodontic concerns. Complete crown #19 after two-four weeks of symptom free with no radiographic concerns.    Attending: Dr. Mendoza  consulted with resident about case .

## 2024-12-24 ENCOUNTER — OFFICE VISIT (OUTPATIENT)
Dept: DENTISTRY | Facility: CLINIC | Age: 34
End: 2024-12-24

## 2024-12-24 VITALS — SYSTOLIC BLOOD PRESSURE: 95 MMHG | HEART RATE: 86 BPM | DIASTOLIC BLOOD PRESSURE: 65 MMHG | TEMPERATURE: 98.6 F

## 2024-12-24 DIAGNOSIS — K08.50 DEFECTIVE DENTAL RESTORATION: Primary | ICD-10-CM

## 2024-12-24 PROCEDURE — WIS2002 PR RESTORE REDO <1YR

## 2024-12-24 PROCEDURE — D2392 RESIN-BASED COMPOSITE - 2 SURFACES, POSTERIOR: HCPCS

## 2024-12-24 NOTE — PROGRESS NOTES
Procedure Details  19 DO  - RESIN-BASED COMPOSITE - 2 SURFACES, POSTERIOR    [id number] - CT RESTORE REDO <1YR    Composite Restoration #19-DO Redo     Gladiscolby Osullivanflavio 34 y.o. female presents with self to [facility] for composite restoration  PMH reviewed, no changes, ASA II. Significant medical history: Hx of breastfeeding. Significant allergies: NKDA. Significant medications: Reviewed with pt.     Diagnosis:  Fracture of distal GI #19-DO; Biodentin was placed at yesterday's appointment and GI has frascutred off of distal margin.      Prognosis:  Fair     Consent:  Risks of specific procedure: need for RCT if pulp exposure occurs or in future if pulp is inflamed, need to revise tx plan based on extent of decay, damage to adjacent tooth and/or restoration.  Risks of any dental procedure: post procedural pain or sensitivity, local anesthetic side effects, allergic reaction to dental materials and medications, breakage of local anesthetic needle, aspiration of small dental tools, injury to nearby hard and soft tissues and anatomical structures.  Benefits: prevent further breakdown of tooth and its sequelae.  Alternatives: Crown, no tx.  Tx plan for composite restoration #19 reviewed. Opportunity to ask questions given, all questions answered to degree of medical and dental certainty.  Patient understands and consent given by self via verbal consent.     Anesthesia:  Topical 20% benzocaine.  1.5 carps 2% Lidocaine 1:100k epi via MADDY block, buccal infiltration, and palatal/lingual infiltration.     Procedure details:  Isolation: DryShield   Prepped teeth #19 with high speed handpiece.  Composite shade A2 was used for restoration and light cured for 20 seconds.   Checked occlusion and adjusted with finishing burs.  Checked contacts with floss  Verified occlusion and contacts.     Patient dismissed ambulatory and alert.     NV: Continue resin fillings, #2-O and #3-DO.     Continue to monitor #19 for endodontic concerns.  Complete crown #19 after two-four weeks of symptom free with no radiographic concerns.     Attending: Dr. Wei was present in clinic

## (undated) DEVICE — INSUFLATION TUBING INSUFLOW (LEXION)

## (undated) DEVICE — GLOVE PI ULTRA TOUCH SZ.7.0

## (undated) DEVICE — TUBING SMOKE EVAC W/FILTRATION DEVICE PLUMEPORT ACTIV

## (undated) DEVICE — PLASTIC ADHESIVE BANDAGE: Brand: CURITY

## (undated) DEVICE — PACK PBDS MINOR GYN LAP RF

## (undated) DEVICE — GLOVE INDICATOR PI UNDERGLOVE SZ 6.5 BLUE

## (undated) DEVICE — ADHESIVE SKIN HIGH VISCOSITY EXOFIN 1ML

## (undated) DEVICE — 3000CC GUARDIAN II: Brand: GUARDIAN

## (undated) DEVICE — TRAY FOLEY 16FR URIMETER SILICONE SURESTEP

## (undated) DEVICE — TROCAR: Brand: KII FIOS FIRST ENTRY

## (undated) DEVICE — SUT VICRYL 0 UR-6 27 IN J603H

## (undated) DEVICE — TROCARS: Brand: KII® BALLOON BLUNT TIP SYSTEM

## (undated) DEVICE — ENSEAL X1 TISSUE SEALER, CURVED JAW, 37 CM SHAFT LENGTH: Brand: ENSEAL

## (undated) DEVICE — CHLORAPREP HI-LITE 26ML ORANGE

## (undated) DEVICE — PREMIUM DRY TRAY LF: Brand: MEDLINE INDUSTRIES, INC.

## (undated) DEVICE — INTENDED FOR TISSUE SEPARATION, AND OTHER PROCEDURES THAT REQUIRE A SHARP SURGICAL BLADE TO PUNCTURE OR CUT.: Brand: BARD-PARKER SAFETY BLADES SIZE 11, STERILE

## (undated) DEVICE — SUT MONOCRYL 4-0 PS-2 18 IN Y496G